# Patient Record
Sex: FEMALE | Race: BLACK OR AFRICAN AMERICAN | Employment: FULL TIME | URBAN - NONMETROPOLITAN AREA
[De-identification: names, ages, dates, MRNs, and addresses within clinical notes are randomized per-mention and may not be internally consistent; named-entity substitution may affect disease eponyms.]

---

## 2017-08-21 ENCOUNTER — NURSE TRIAGE (OUTPATIENT)
Dept: ADMINISTRATIVE | Age: 27
End: 2017-08-21

## 2017-09-11 ENCOUNTER — HOSPITAL ENCOUNTER (OUTPATIENT)
Dept: GENERAL RADIOLOGY | Age: 27
Discharge: HOME | End: 2017-09-11
Payer: COMMERCIAL

## 2017-09-11 ENCOUNTER — HOSPITAL ENCOUNTER (OUTPATIENT)
Age: 27
Discharge: HOME | End: 2017-09-11
Payer: COMMERCIAL

## 2017-09-11 DIAGNOSIS — Z01.818 PRE-OP TESTING: ICD-10-CM

## 2017-09-11 LAB
ALBUMIN SERPL-MCNC: 4.3 G/DL (ref 3.5–5.1)
ALP BLD-CCNC: 100 U/L (ref 38–126)
ALT SERPL-CCNC: 16 U/L (ref 11–66)
ANION GAP SERPL CALCULATED.3IONS-SCNC: 14 MEQ/L (ref 8–16)
AST SERPL-CCNC: 20 U/L (ref 5–40)
BILIRUB SERPL-MCNC: 0.2 MG/DL (ref 0.3–1.2)
BUN BLDV-MCNC: 15 MG/DL (ref 7–22)
CALCIUM SERPL-MCNC: 9.9 MG/DL (ref 8.5–10.5)
CHLORIDE BLD-SCNC: 101 MEQ/L (ref 98–111)
CO2: 25 MEQ/L (ref 23–33)
CREAT SERPL-MCNC: 0.8 MG/DL (ref 0.4–1.2)
EKG ATRIAL RATE: 77 BPM
EKG P AXIS: 69 DEGREES
EKG P-R INTERVAL: 148 MS
EKG Q-T INTERVAL: 368 MS
EKG QRS DURATION: 86 MS
EKG QTC CALCULATION (BAZETT): 416 MS
EKG R AXIS: 66 DEGREES
EKG T AXIS: 62 DEGREES
EKG VENTRICULAR RATE: 77 BPM
GFR SERPL CREATININE-BSD FRML MDRD: > 90 ML/MIN/1.73M2
GLUCOSE BLD-MCNC: 88 MG/DL (ref 70–108)
HCT VFR BLD CALC: 42.8 % (ref 37–47)
HEMOGLOBIN: 13.7 GM/DL (ref 12–16)
MCH RBC QN AUTO: 26.6 PG (ref 27–31)
MCHC RBC AUTO-ENTMCNC: 32 GM/DL (ref 33–37)
MCV RBC AUTO: 83.4 FL (ref 81–99)
PDW BLD-RTO: 13.1 % (ref 11.5–14.5)
PLATELET # BLD: 228 THOU/MM3 (ref 130–400)
PMV BLD AUTO: 9.5 MCM (ref 7.4–10.4)
POTASSIUM SERPL-SCNC: 4.4 MEQ/L (ref 3.5–5.2)
RBC # BLD: 5.14 MILL/MM3 (ref 4.2–5.4)
SODIUM BLD-SCNC: 140 MEQ/L (ref 135–145)
TOTAL PROTEIN: 8 G/DL (ref 6.1–8)
WBC # BLD: 5.3 THOU/MM3 (ref 4.8–10.8)

## 2017-09-11 PROCEDURE — 85027 COMPLETE CBC AUTOMATED: CPT

## 2017-09-11 PROCEDURE — 93005 ELECTROCARDIOGRAM TRACING: CPT

## 2017-09-11 PROCEDURE — 80053 COMPREHEN METABOLIC PANEL: CPT

## 2017-09-11 PROCEDURE — 36415 COLL VENOUS BLD VENIPUNCTURE: CPT

## 2017-09-11 PROCEDURE — 71020 XR CHEST STANDARD TWO VW: CPT

## 2017-09-20 NOTE — PROGRESS NOTES
Chivo Milton.   ----------------------------------------------------------------------------------------------------------------

## 2017-09-27 ENCOUNTER — ANESTHESIA (OUTPATIENT)
Dept: OPERATING ROOM | Age: 27
End: 2017-09-27
Payer: COMMERCIAL

## 2017-09-27 ENCOUNTER — ANESTHESIA EVENT (OUTPATIENT)
Dept: OPERATING ROOM | Age: 27
End: 2017-09-27
Payer: COMMERCIAL

## 2017-09-27 ENCOUNTER — HOSPITAL ENCOUNTER (OUTPATIENT)
Age: 27
Setting detail: OUTPATIENT SURGERY
Discharge: HOME | End: 2017-09-27
Attending: PODIATRIST | Admitting: PODIATRIST
Payer: COMMERCIAL

## 2017-09-27 VITALS
WEIGHT: 155 LBS | SYSTOLIC BLOOD PRESSURE: 116 MMHG | HEIGHT: 66 IN | DIASTOLIC BLOOD PRESSURE: 71 MMHG | BODY MASS INDEX: 24.91 KG/M2 | TEMPERATURE: 98.4 F | RESPIRATION RATE: 16 BRPM | HEART RATE: 94 BPM | OXYGEN SATURATION: 100 %

## 2017-09-27 VITALS
TEMPERATURE: 98 F | OXYGEN SATURATION: 100 % | RESPIRATION RATE: 9 BRPM | DIASTOLIC BLOOD PRESSURE: 80 MMHG | SYSTOLIC BLOOD PRESSURE: 146 MMHG

## 2017-09-27 LAB — PREGNANCY, URINE: NEGATIVE

## 2017-09-27 PROCEDURE — 3700000001 HC ADD 15 MINUTES (ANESTHESIA): Performed by: PODIATRIST

## 2017-09-27 PROCEDURE — C1769 GUIDE WIRE: HCPCS | Performed by: PODIATRIST

## 2017-09-27 PROCEDURE — 7100000011 HC PHASE II RECOVERY - ADDTL 15 MIN: Performed by: PODIATRIST

## 2017-09-27 PROCEDURE — 7100000010 HC PHASE II RECOVERY - FIRST 15 MIN: Performed by: PODIATRIST

## 2017-09-27 PROCEDURE — 81025 URINE PREGNANCY TEST: CPT

## 2017-09-27 PROCEDURE — 3600000004 HC SURGERY LEVEL 4 BASE: Performed by: PODIATRIST

## 2017-09-27 PROCEDURE — 6360000002 HC RX W HCPCS: Performed by: ANESTHESIOLOGY

## 2017-09-27 PROCEDURE — 2580000003 HC RX 258: Performed by: STUDENT IN AN ORGANIZED HEALTH CARE EDUCATION/TRAINING PROGRAM

## 2017-09-27 PROCEDURE — C1713 ANCHOR/SCREW BN/BN,TIS/BN: HCPCS | Performed by: PODIATRIST

## 2017-09-27 PROCEDURE — 2500000003 HC RX 250 WO HCPCS: Performed by: PODIATRIST

## 2017-09-27 PROCEDURE — 7100000001 HC PACU RECOVERY - ADDTL 15 MIN: Performed by: PODIATRIST

## 2017-09-27 PROCEDURE — A6222 GAUZE <=16 IN NO W/SAL W/O B: HCPCS | Performed by: PODIATRIST

## 2017-09-27 PROCEDURE — 6370000000 HC RX 637 (ALT 250 FOR IP): Performed by: STUDENT IN AN ORGANIZED HEALTH CARE EDUCATION/TRAINING PROGRAM

## 2017-09-27 PROCEDURE — 6360000002 HC RX W HCPCS: Performed by: STUDENT IN AN ORGANIZED HEALTH CARE EDUCATION/TRAINING PROGRAM

## 2017-09-27 PROCEDURE — 3600000014 HC SURGERY LEVEL 4 ADDTL 15MIN: Performed by: PODIATRIST

## 2017-09-27 PROCEDURE — 3700000000 HC ANESTHESIA ATTENDED CARE: Performed by: PODIATRIST

## 2017-09-27 PROCEDURE — 7100000000 HC PACU RECOVERY - FIRST 15 MIN: Performed by: PODIATRIST

## 2017-09-27 DEVICE — IMPLANTABLE DEVICE: Type: IMPLANTABLE DEVICE | Site: FOOT | Status: FUNCTIONAL

## 2017-09-27 DEVICE — CANNULATED SCREW
Type: IMPLANTABLE DEVICE | Site: FOOT | Status: FUNCTIONAL
Brand: ASNIS

## 2017-09-27 DEVICE — ALLOGRAFT BNE CRUSH 1-10 MM 10 CC CANC: Type: IMPLANTABLE DEVICE | Site: FOOT | Status: FUNCTIONAL

## 2017-09-27 RX ORDER — MORPHINE SULFATE 10 MG/ML
INJECTION, SOLUTION INTRAMUSCULAR; INTRAVENOUS PRN
Status: DISCONTINUED | OUTPATIENT
Start: 2017-09-27 | End: 2017-09-27 | Stop reason: SDUPTHER

## 2017-09-27 RX ORDER — SODIUM CHLORIDE 0.9 % (FLUSH) 0.9 %
10 SYRINGE (ML) INJECTION EVERY 12 HOURS SCHEDULED
Status: DISCONTINUED | OUTPATIENT
Start: 2017-09-27 | End: 2017-09-27 | Stop reason: HOSPADM

## 2017-09-27 RX ORDER — MORPHINE SULFATE 2 MG/ML
2 INJECTION, SOLUTION INTRAMUSCULAR; INTRAVENOUS EVERY 5 MIN PRN
Status: DISCONTINUED | OUTPATIENT
Start: 2017-09-27 | End: 2017-09-27 | Stop reason: HOSPADM

## 2017-09-27 RX ORDER — ACETAMINOPHEN 325 MG/1
650 TABLET ORAL EVERY 4 HOURS PRN
Status: DISCONTINUED | OUTPATIENT
Start: 2017-09-27 | End: 2017-09-27 | Stop reason: HOSPADM

## 2017-09-27 RX ORDER — DIPHENHYDRAMINE HYDROCHLORIDE 50 MG/ML
12.5 INJECTION INTRAMUSCULAR; INTRAVENOUS
Status: DISCONTINUED | OUTPATIENT
Start: 2017-09-27 | End: 2017-09-27 | Stop reason: HOSPADM

## 2017-09-27 RX ORDER — HYDRALAZINE HYDROCHLORIDE 20 MG/ML
5 INJECTION INTRAMUSCULAR; INTRAVENOUS EVERY 10 MIN PRN
Status: DISCONTINUED | OUTPATIENT
Start: 2017-09-27 | End: 2017-09-27 | Stop reason: HOSPADM

## 2017-09-27 RX ORDER — OXYCODONE HYDROCHLORIDE AND ACETAMINOPHEN 5; 325 MG/1; MG/1
1 TABLET ORAL EVERY 4 HOURS PRN
Status: DISCONTINUED | OUTPATIENT
Start: 2017-09-27 | End: 2017-09-27 | Stop reason: HOSPADM

## 2017-09-27 RX ORDER — FENTANYL CITRATE 50 UG/ML
50 INJECTION, SOLUTION INTRAMUSCULAR; INTRAVENOUS EVERY 5 MIN PRN
Status: DISCONTINUED | OUTPATIENT
Start: 2017-09-27 | End: 2017-09-27 | Stop reason: HOSPADM

## 2017-09-27 RX ORDER — MEPERIDINE HYDROCHLORIDE 25 MG/ML
12.5 INJECTION INTRAMUSCULAR; INTRAVENOUS; SUBCUTANEOUS EVERY 5 MIN PRN
Status: DISCONTINUED | OUTPATIENT
Start: 2017-09-27 | End: 2017-09-27 | Stop reason: HOSPADM

## 2017-09-27 RX ORDER — ONDANSETRON 2 MG/ML
4 INJECTION INTRAMUSCULAR; INTRAVENOUS
Status: DISCONTINUED | OUTPATIENT
Start: 2017-09-27 | End: 2017-09-27 | Stop reason: HOSPADM

## 2017-09-27 RX ORDER — SODIUM CHLORIDE 0.9 % (FLUSH) 0.9 %
10 SYRINGE (ML) INJECTION PRN
Status: DISCONTINUED | OUTPATIENT
Start: 2017-09-27 | End: 2017-09-27 | Stop reason: HOSPADM

## 2017-09-27 RX ORDER — ONDANSETRON 2 MG/ML
4 INJECTION INTRAMUSCULAR; INTRAVENOUS EVERY 6 HOURS PRN
Status: DISCONTINUED | OUTPATIENT
Start: 2017-09-27 | End: 2017-09-27 | Stop reason: HOSPADM

## 2017-09-27 RX ORDER — MIDAZOLAM HYDROCHLORIDE 1 MG/ML
INJECTION INTRAMUSCULAR; INTRAVENOUS PRN
Status: DISCONTINUED | OUTPATIENT
Start: 2017-09-27 | End: 2017-09-27 | Stop reason: SDUPTHER

## 2017-09-27 RX ORDER — OXYCODONE HYDROCHLORIDE AND ACETAMINOPHEN 5; 325 MG/1; MG/1
2 TABLET ORAL EVERY 4 HOURS PRN
Status: DISCONTINUED | OUTPATIENT
Start: 2017-09-27 | End: 2017-09-27 | Stop reason: HOSPADM

## 2017-09-27 RX ORDER — SODIUM CHLORIDE 9 MG/ML
INJECTION, SOLUTION INTRAVENOUS CONTINUOUS
Status: DISCONTINUED | OUTPATIENT
Start: 2017-09-27 | End: 2017-09-27 | Stop reason: HOSPADM

## 2017-09-27 RX ORDER — LABETALOL HYDROCHLORIDE 5 MG/ML
5 INJECTION, SOLUTION INTRAVENOUS EVERY 5 MIN PRN
Status: DISCONTINUED | OUTPATIENT
Start: 2017-09-27 | End: 2017-09-27 | Stop reason: HOSPADM

## 2017-09-27 RX ORDER — DOCUSATE SODIUM 100 MG/1
100 CAPSULE, LIQUID FILLED ORAL 2 TIMES DAILY
Status: DISCONTINUED | OUTPATIENT
Start: 2017-09-27 | End: 2017-09-27 | Stop reason: HOSPADM

## 2017-09-27 RX ORDER — PROPOFOL 10 MG/ML
INJECTION, EMULSION INTRAVENOUS PRN
Status: DISCONTINUED | OUTPATIENT
Start: 2017-09-27 | End: 2017-09-27 | Stop reason: SDUPTHER

## 2017-09-27 RX ORDER — BUPIVACAINE HYDROCHLORIDE AND EPINEPHRINE 5; 5 MG/ML; UG/ML
INJECTION, SOLUTION EPIDURAL; INTRACAUDAL; PERINEURAL PRN
Status: DISCONTINUED | OUTPATIENT
Start: 2017-09-27 | End: 2017-09-27 | Stop reason: HOSPADM

## 2017-09-27 RX ADMIN — PROPOFOL 150 MG: 10 INJECTION, EMULSION INTRAVENOUS at 13:22

## 2017-09-27 RX ADMIN — FENTANYL CITRATE 50 MCG: 50 INJECTION INTRAMUSCULAR; INTRAVENOUS at 16:27

## 2017-09-27 RX ADMIN — MORPHINE SULFATE 2 MG: 10 INJECTION, SOLUTION INTRAMUSCULAR; INTRAVENOUS at 14:05

## 2017-09-27 RX ADMIN — FENTANYL CITRATE 50 MCG: 50 INJECTION INTRAMUSCULAR; INTRAVENOUS at 16:18

## 2017-09-27 RX ADMIN — MORPHINE SULFATE 2 MG: 10 INJECTION, SOLUTION INTRAMUSCULAR; INTRAVENOUS at 15:15

## 2017-09-27 RX ADMIN — OXYCODONE HYDROCHLORIDE AND ACETAMINOPHEN 2 TABLET: 5; 325 TABLET ORAL at 17:22

## 2017-09-27 RX ADMIN — FENTANYL CITRATE 100 MCG: 50 INJECTION INTRAMUSCULAR; INTRAVENOUS at 13:17

## 2017-09-27 RX ADMIN — MORPHINE SULFATE 2 MG: 10 INJECTION, SOLUTION INTRAMUSCULAR; INTRAVENOUS at 14:01

## 2017-09-27 RX ADMIN — MORPHINE SULFATE 2 MG: 10 INJECTION, SOLUTION INTRAMUSCULAR; INTRAVENOUS at 14:30

## 2017-09-27 RX ADMIN — CEFAZOLIN SODIUM 2 G: 2 SOLUTION INTRAVENOUS at 13:26

## 2017-09-27 RX ADMIN — MIDAZOLAM HYDROCHLORIDE 2 MG: 1 INJECTION INTRAMUSCULAR; INTRAVENOUS at 13:16

## 2017-09-27 RX ADMIN — MORPHINE SULFATE 2 MG: 10 INJECTION, SOLUTION INTRAMUSCULAR; INTRAVENOUS at 13:42

## 2017-09-27 RX ADMIN — SODIUM CHLORIDE: 9 INJECTION, SOLUTION INTRAVENOUS at 13:15

## 2017-09-27 ASSESSMENT — PULMONARY FUNCTION TESTS
PIF_VALUE: 2
PIF_VALUE: 19
PIF_VALUE: 2
PIF_VALUE: 19
PIF_VALUE: 19
PIF_VALUE: 10
PIF_VALUE: 24
PIF_VALUE: 3
PIF_VALUE: 2
PIF_VALUE: 13
PIF_VALUE: 5
PIF_VALUE: 2
PIF_VALUE: 19
PIF_VALUE: 3
PIF_VALUE: 5
PIF_VALUE: 2
PIF_VALUE: 2
PIF_VALUE: 16
PIF_VALUE: 19
PIF_VALUE: 2
PIF_VALUE: 2
PIF_VALUE: 3
PIF_VALUE: 19
PIF_VALUE: 17
PIF_VALUE: 2
PIF_VALUE: 2
PIF_VALUE: 18
PIF_VALUE: 2
PIF_VALUE: 18
PIF_VALUE: 11
PIF_VALUE: 19
PIF_VALUE: 19
PIF_VALUE: 17
PIF_VALUE: 18
PIF_VALUE: 2
PIF_VALUE: 3
PIF_VALUE: 16
PIF_VALUE: 13
PIF_VALUE: 17
PIF_VALUE: 20
PIF_VALUE: 19
PIF_VALUE: 2
PIF_VALUE: 2
PIF_VALUE: 17
PIF_VALUE: 18
PIF_VALUE: 5
PIF_VALUE: 10
PIF_VALUE: 19
PIF_VALUE: 2
PIF_VALUE: 16
PIF_VALUE: 17
PIF_VALUE: 17
PIF_VALUE: 2
PIF_VALUE: 17
PIF_VALUE: 2
PIF_VALUE: 16
PIF_VALUE: 18
PIF_VALUE: 15
PIF_VALUE: 23
PIF_VALUE: 18
PIF_VALUE: 4
PIF_VALUE: 2
PIF_VALUE: 2
PIF_VALUE: 5
PIF_VALUE: 2
PIF_VALUE: 3
PIF_VALUE: 19
PIF_VALUE: 3
PIF_VALUE: 2
PIF_VALUE: 2
PIF_VALUE: 17
PIF_VALUE: 3
PIF_VALUE: 2
PIF_VALUE: 2
PIF_VALUE: 10
PIF_VALUE: 19
PIF_VALUE: 2
PIF_VALUE: 17
PIF_VALUE: 22
PIF_VALUE: 17
PIF_VALUE: 2
PIF_VALUE: 5
PIF_VALUE: 3
PIF_VALUE: 2
PIF_VALUE: 5
PIF_VALUE: 17
PIF_VALUE: 3
PIF_VALUE: 2
PIF_VALUE: 19
PIF_VALUE: 26
PIF_VALUE: 3
PIF_VALUE: 18
PIF_VALUE: 18
PIF_VALUE: 3
PIF_VALUE: 18
PIF_VALUE: 2
PIF_VALUE: 19
PIF_VALUE: 2
PIF_VALUE: 3
PIF_VALUE: 9
PIF_VALUE: 3
PIF_VALUE: 17
PIF_VALUE: 19
PIF_VALUE: 2
PIF_VALUE: 22
PIF_VALUE: 3
PIF_VALUE: 18
PIF_VALUE: 17
PIF_VALUE: 2
PIF_VALUE: 0
PIF_VALUE: 19
PIF_VALUE: 19
PIF_VALUE: 0
PIF_VALUE: 4
PIF_VALUE: 2
PIF_VALUE: 9
PIF_VALUE: 19
PIF_VALUE: 17
PIF_VALUE: 2
PIF_VALUE: 19
PIF_VALUE: 15
PIF_VALUE: 2
PIF_VALUE: 2
PIF_VALUE: 4
PIF_VALUE: 17
PIF_VALUE: 2
PIF_VALUE: 3
PIF_VALUE: 2
PIF_VALUE: 17
PIF_VALUE: 19
PIF_VALUE: 2
PIF_VALUE: 14
PIF_VALUE: 0
PIF_VALUE: 2
PIF_VALUE: 3
PIF_VALUE: 19
PIF_VALUE: 17
PIF_VALUE: 17
PIF_VALUE: 2
PIF_VALUE: 6
PIF_VALUE: 1
PIF_VALUE: 4
PIF_VALUE: 19
PIF_VALUE: 1
PIF_VALUE: 22
PIF_VALUE: 17
PIF_VALUE: 16
PIF_VALUE: 20
PIF_VALUE: 19
PIF_VALUE: 19
PIF_VALUE: 2
PIF_VALUE: 3
PIF_VALUE: 19
PIF_VALUE: 2
PIF_VALUE: 4
PIF_VALUE: 0
PIF_VALUE: 2
PIF_VALUE: 19
PIF_VALUE: 16

## 2017-09-27 ASSESSMENT — PAIN SCALES - GENERAL
PAINLEVEL_OUTOF10: 9
PAINLEVEL_OUTOF10: 8
PAINLEVEL_OUTOF10: 10
PAINLEVEL_OUTOF10: 10

## 2017-09-27 ASSESSMENT — PAIN DESCRIPTION - ORIENTATION: ORIENTATION: LEFT

## 2017-09-27 ASSESSMENT — PAIN DESCRIPTION - LOCATION: LOCATION: FOOT

## 2017-09-27 ASSESSMENT — PAIN - FUNCTIONAL ASSESSMENT: PAIN_FUNCTIONAL_ASSESSMENT: 0-10

## 2017-09-27 ASSESSMENT — PAIN DESCRIPTION - DESCRIPTORS: DESCRIPTORS: ACHING

## 2017-09-27 ASSESSMENT — PAIN DESCRIPTION - PAIN TYPE: TYPE: SURGICAL PAIN

## 2017-09-27 NOTE — OP NOTE
procedure has been explained in detail to the patient, outlining  the risks, benefits and possible complications including the need for further  surgery. No promises have been made as to the surgical outcome. Consent was reviewed and signed. All questions and concerns regarding the case were addressed in pre-op. The surgical site was verified and marked by Dr. Jessee Stewart. The patient was then transferred to the operative room and place in a supine position. Time out taken, verifying patient and planned procedure. A well padded thigh tourniquet was applied to the left leg. Patient was administered general anesthesia. Surgical site  was then prepped using betadine and draped using sterile technique. The patient received 60cc of 0.5% marcaine with epinephrine post-operatively. Procedure In Detail  Procedure:   1. Gastrocnemius Resection Left Leg:   Attention was then directed to the posterior calf of the left leg. The medial and lateral heads of the gastrocnemius muscle were palpated, and a skin line was drawn just medial to the midline of the gastroc aponeurosis, to ensure visualization of the larger medial gastrocnemius head. A sharp linear incision, approximately 3cm in length was made full thickness with a scalpel blade. Reyes scissors were then used to bluntly dissect through subcutaneous tissue, making sure to retract all vital neurovascular structures. Upon visualization of the paratenon, blunt dissection with a finger was used to clear the gastrocnemius and paratenon of any subcutaneous fat that was adhered. A scalpel blade was then used to linearly incise the paratenon, allowing for clear visualization of the gastrocnemius. The paratenon was then retracted medially and laterally with [place]'s. The gastrocnemius tendon was then incised with a scalpel blade, cutting all fibers in a transverse fashion. Blunt inspection with a finger was used to ensure release of all gastroc fibers.  The surgical area was then dissected using a key periosteal elevator. Using a TPS sagittal saw, a complete osteotomy was made from the superior posterior aspect of the calcaneus to to the plantar tubercle. An osteotome was then placed in the osteotomy to stretch the medial periosteum. The tuber segment of the calcaneus was then translated medially, approximately 1 cm and was provisionally fixated with a 0.062 kwire. The foot was held in [address] during displacement in order to relax the Achilles tendon . At this time, a cannulated drill bit was used to create a drill hole for screw fixation. Positioning of the drill was checked under fluroscopy. Once adequate alignment was achieved, the cannulated drill was removed, the drill whole was measured, and a 6.5  60mm luis asnis, partially threaded cancellous screw was placed. Screw postioning was checked under fluoroscopy and was found to be adequate and the temporary Kwire fixation was removed. 4. Cotton Osteotomy, left foot. Attention was then directed to the dorsal medial aspect of the medial cuneiform, which was visualized under flourscopy. A full thickness incision was made using a 15 scalpel, extending dorsally from the proximal to distal most aspect of the medial cuneiform. The initial incision was made full thickness, making sure to carefully retract all vital nvs, and cauterize any small bleeding vessels. Blunt dissection was then carried down to the level of the periosteum. Using a TPS sagittal saw, an osteotomy was made from dorsal to plantar fashion in the medial cuneiform. A hentermen retractor was then utilized with the addition of (2) 0.62 kwires to distract the osteotomy. At this time the foot position was evaluated and the graft size was determined to be 10mm. An allograft iliac crest graft was then remolded in a trapezoid shape and placed in the distraction site. At this time the hinterman retractor and Kwires were removed.  Graft positioning was

## 2017-09-27 NOTE — IP AVS SNAPSHOT
After Visit Summary  (Discharge Instructions)    Medication List for Home    Based on the information you provided to us as well as any changes during this visit, the following is your updated medication list.  Compare this with your prescription bottles at home. If you have any questions or concerns, contact your primary care physician's office. Daily Medication List (This medication list can be shared with any healthcare provider who is helping you manage your medications)      These are medications you told us you were taking at home, CONTINUE taking them after you leave the hospital        Last Dose    Next Dose Due AM NOON PM NIGHT    ADVAIR -21 MCG/ACT inhaler   Generic drug:  fluticasone-salmeterol   Inhale 1 puff into the lungs 2 times daily. albuterol sulfate  (90 Base) MCG/ACT inhaler   Commonly known as:  PROVENTIL HFA   Inhale 2 puffs into the lungs every 4 hours as needed for Wheezing or Shortness of Breath (Space out to every 6 hours as symptoms improve). Space out to every 6 hours as symptoms improve. INHALER COMPANIONS   by Does not apply route. PRENATAL VIT-DSS-FE FUM-FA PO   Take  by mouth. ranitidine 150 MG tablet   Commonly known as:  ZANTAC   Take 1 tablet by mouth 2 times daily                                                 Allergies as of 9/27/2017     No Known Allergies      Immunizations as of 9/27/2017     Name Date Dose VIS Date Route    Tdap (Boostrix, Adacel) 2/22/2017 0.5 mL 2/24/2015 Intramuscular    Tdap (Boostrix, Adacel) 5/15/2014 0.5 mL 5/9/2013 Intramuscular      Last Vitals          Most Recent Value    Temp  98.4 °F (36.9 °C)    Pulse  87    Resp  11    BP  119/76         After Visit Summary    This summary was created for you. Thank you for entrusting your care to us. The following information includes details about your hospital/visit stay along with steps you should take to help with your recovery once you leave the hospital.  In this packet, you will find information about the topics listed below:    · Instructions about your medications including a list of your home medications  · A summary of your hospital visit  · Follow-up appointments once you have left the hospital  · Your care plan at home      You may receive a survey regarding the care you received during your stay. Your input is valuable to us. We encourage you to complete and return your survey in the envelope provided. We hope you will choose us in the future for your healthcare needs. Patient Information     Patient Name VINCENZO Duff 1990      Care Provided at:     Name Address Phone       [address] [phone]            Your Visit    Here you will find information about your visit, including the reason for your visit. Please take this sheet with you when you visit your doctor or other health care provider in the future. It will help determine the best possible medical care for you at that time. If you have any questions once you leave the hospital, please call the department phone number listed below. Why you were here     Your primary diagnosis was:  Not on File      Visit Information     Date & Time Provider Department Dept. Phone    2017 Brandon Ronquillo [phone]       Follow-up Appointments    Below is a list of your follow-up and future appointments. This may not be a complete list as you may have made appointments directly with providers that we are not aware of or your providers may have made some for you. Please call your providers to confirm appointments. It is important to keep your appointments. Please bring your current insurance card, photo ID, co-pay, and all medication bottles to your appointment. If self-pay, payment is expected at the time of service. Preventive Care        Date Due    Pap Smear 10/8/2012    Yearly Flu Vaccine (1) 9/1/2017    Tetanus Combination Vaccine (3 - Td) 2/22/2027                 Care Plan Once You Return Home    This section includes instructions you will need to follow once you leave the hospital.  Your care team will discuss these with you, so you and those caring for you know how to best care for your health needs at home. This section may also include educational information about certain health topics that may be of help to you. Discharge Instructions       Post Op Instructions    Pt Name: Hi Landry Record Number: [id number]  Today's Date: 9/27/2017    GENERAL ANESTHESIA OR SEDATION  1. Do not drive or operate hazardous machinery for 24 hours. 2. Do not make important business or personal decisions for 24 hours. 3. Do not drink alcoholic beverages or use tobacco for 24 hours. ACTIVITY INSTRUCTIONS:   Rest today. Resume light to normal activity tomorrow. You may ambulate as tolerated in your post op shoe/boot. No weight is to be placed on the operative limb. Use crutches, walker, Roll-a-bout as needed. Elevate the operative limb as much as possible to reduce swelling and discomfort for the first 72 hours      DIET INSTRUCTIONS:  Begin with clear liquids. If not nauseated, may increase to a low-fat diet when you desire. Regular diet as tolerated. Other:     MEDICATIONS  Prescription sent with you to be used as directed. Toradol   Vicodin   Percocet   Tylenol #3   Duricef   Do not drink alcohol or drive while taking these medications. You may experience dizziness or drowsiness with these medications.  You may also experience constipation which can be relieved with stool softners or

## 2017-09-27 NOTE — IP AVS SNAPSHOT
Patient Information     Patient Name VINCENZO Camara N 1990         This is your updated medication list to keep with you all times      TAKE these medications     ADVAIR -21 MCG/ACT inhaler   Generic drug:  fluticasone-salmeterol       albuterol sulfate  (90 Base) MCG/ACT inhaler   Commonly known as:  PROVENTIL HFA   Inhale 2 puffs into the lungs every 4 hours as needed for Wheezing or Shortness of Breath (Space out to every 6 hours as symptoms improve). Space out to every 6 hours as symptoms improve.        INHALER COMPANIONS       PRENATAL VIT-DSS-FE FUM-FA PO       ranitidine 150 MG tablet   Commonly known as:  ZANTAC   Take 1 tablet by mouth 2 times daily

## 2018-01-18 ENCOUNTER — HOSPITAL ENCOUNTER (OUTPATIENT)
Dept: GENERAL RADIOLOGY | Age: 28
Discharge: HOME | End: 2018-01-18
Payer: COMMERCIAL

## 2018-01-18 ENCOUNTER — HOSPITAL ENCOUNTER (OUTPATIENT)
Age: 28
Discharge: HOME | End: 2018-01-18
Payer: COMMERCIAL

## 2018-01-18 DIAGNOSIS — Z01.818 PRE-OP TESTING: ICD-10-CM

## 2018-01-18 LAB
ALBUMIN SERPL-MCNC: 3.9 G/DL (ref 3.5–5.1)
ALP BLD-CCNC: 99 U/L (ref 38–126)
ALT SERPL-CCNC: 16 U/L (ref 11–66)
ANION GAP SERPL CALCULATED.3IONS-SCNC: 14 MEQ/L (ref 8–16)
AST SERPL-CCNC: 20 U/L (ref 5–40)
BILIRUB SERPL-MCNC: 0.2 MG/DL (ref 0.3–1.2)
BUN BLDV-MCNC: 14 MG/DL (ref 7–22)
CALCIUM SERPL-MCNC: 9.3 MG/DL (ref 8.5–10.5)
CHLORIDE BLD-SCNC: 104 MEQ/L (ref 98–111)
CO2: 22 MEQ/L (ref 23–33)
CREAT SERPL-MCNC: 0.6 MG/DL (ref 0.4–1.2)
GFR SERPL CREATININE-BSD FRML MDRD: > 90 ML/MIN/1.73M2
GLUCOSE BLD-MCNC: 84 MG/DL (ref 70–108)
HCT VFR BLD CALC: 38.3 % (ref 37–47)
HEMOGLOBIN: 12.4 GM/DL (ref 12–16)
MCH RBC QN AUTO: 26 PG (ref 27–31)
MCHC RBC AUTO-ENTMCNC: 32.4 GM/DL (ref 33–37)
MCV RBC AUTO: 80.4 FL (ref 81–99)
PDW BLD-RTO: 14.1 % (ref 11.5–14.5)
PLATELET # BLD: 220 THOU/MM3 (ref 130–400)
PMV BLD AUTO: 9.1 MCM (ref 7.4–10.4)
POTASSIUM SERPL-SCNC: 4.8 MEQ/L (ref 3.5–5.2)
RBC # BLD: 4.76 MILL/MM3 (ref 4.2–5.4)
SODIUM BLD-SCNC: 140 MEQ/L (ref 135–145)
TOTAL PROTEIN: 7.4 G/DL (ref 6.1–8)
WBC # BLD: 5.2 THOU/MM3 (ref 4.8–10.8)

## 2018-01-18 PROCEDURE — 36415 COLL VENOUS BLD VENIPUNCTURE: CPT

## 2018-01-18 PROCEDURE — 80053 COMPREHEN METABOLIC PANEL: CPT

## 2018-01-18 PROCEDURE — 85027 COMPLETE CBC AUTOMATED: CPT

## 2018-01-18 PROCEDURE — 71046 X-RAY EXAM CHEST 2 VIEWS: CPT

## 2018-02-13 ENCOUNTER — ANESTHESIA EVENT (OUTPATIENT)
Dept: OPERATING ROOM | Age: 28
End: 2018-02-13
Payer: COMMERCIAL

## 2018-02-14 ENCOUNTER — ANESTHESIA (OUTPATIENT)
Dept: OPERATING ROOM | Age: 28
End: 2018-02-14
Payer: COMMERCIAL

## 2018-02-14 ENCOUNTER — HOSPITAL ENCOUNTER (OUTPATIENT)
Age: 28
Setting detail: OUTPATIENT SURGERY
Discharge: HOME | End: 2018-02-14
Attending: PODIATRIST | Admitting: PODIATRIST
Payer: COMMERCIAL

## 2018-02-14 VITALS
SYSTOLIC BLOOD PRESSURE: 106 MMHG | RESPIRATION RATE: 5 BRPM | DIASTOLIC BLOOD PRESSURE: 47 MMHG | OXYGEN SATURATION: 100 % | TEMPERATURE: 96.4 F

## 2018-02-14 VITALS
TEMPERATURE: 97.7 F | DIASTOLIC BLOOD PRESSURE: 75 MMHG | SYSTOLIC BLOOD PRESSURE: 116 MMHG | HEART RATE: 73 BPM | RESPIRATION RATE: 14 BRPM | OXYGEN SATURATION: 99 % | HEIGHT: 66 IN | BODY MASS INDEX: 24.17 KG/M2 | WEIGHT: 150.4 LBS

## 2018-02-14 LAB — PREGNANCY, URINE: NEGATIVE

## 2018-02-14 PROCEDURE — 3700000001 HC ADD 15 MINUTES (ANESTHESIA): Performed by: PODIATRIST

## 2018-02-14 PROCEDURE — 6370000000 HC RX 637 (ALT 250 FOR IP): Performed by: STUDENT IN AN ORGANIZED HEALTH CARE EDUCATION/TRAINING PROGRAM

## 2018-02-14 PROCEDURE — 6360000002 HC RX W HCPCS: Performed by: PODIATRIST

## 2018-02-14 PROCEDURE — 2500000003 HC RX 250 WO HCPCS: Performed by: ANESTHESIOLOGY

## 2018-02-14 PROCEDURE — 3700000000 HC ANESTHESIA ATTENDED CARE: Performed by: PODIATRIST

## 2018-02-14 PROCEDURE — 3600000014 HC SURGERY LEVEL 4 ADDTL 15MIN: Performed by: PODIATRIST

## 2018-02-14 PROCEDURE — 6370000000 HC RX 637 (ALT 250 FOR IP): Performed by: ANESTHESIOLOGY

## 2018-02-14 PROCEDURE — 7100000000 HC PACU RECOVERY - FIRST 15 MIN: Performed by: PODIATRIST

## 2018-02-14 PROCEDURE — 7100000010 HC PHASE II RECOVERY - FIRST 15 MIN: Performed by: PODIATRIST

## 2018-02-14 PROCEDURE — 7100000001 HC PACU RECOVERY - ADDTL 15 MIN: Performed by: PODIATRIST

## 2018-02-14 PROCEDURE — 2500000003 HC RX 250 WO HCPCS: Performed by: PODIATRIST

## 2018-02-14 PROCEDURE — 7100000011 HC PHASE II RECOVERY - ADDTL 15 MIN: Performed by: PODIATRIST

## 2018-02-14 PROCEDURE — 3600000004 HC SURGERY LEVEL 4 BASE: Performed by: PODIATRIST

## 2018-02-14 PROCEDURE — 81025 URINE PREGNANCY TEST: CPT

## 2018-02-14 PROCEDURE — 6360000002 HC RX W HCPCS: Performed by: ANESTHESIOLOGY

## 2018-02-14 RX ORDER — FENTANYL CITRATE 50 UG/ML
25 INJECTION, SOLUTION INTRAMUSCULAR; INTRAVENOUS EVERY 5 MIN PRN
Status: DISCONTINUED | OUTPATIENT
Start: 2018-02-14 | End: 2018-02-14 | Stop reason: HOSPADM

## 2018-02-14 RX ORDER — FENTANYL CITRATE 50 UG/ML
50 INJECTION, SOLUTION INTRAMUSCULAR; INTRAVENOUS EVERY 5 MIN PRN
Status: DISCONTINUED | OUTPATIENT
Start: 2018-02-14 | End: 2018-02-14 | Stop reason: HOSPADM

## 2018-02-14 RX ORDER — OXYCODONE HYDROCHLORIDE AND ACETAMINOPHEN 5; 325 MG/1; MG/1
1 TABLET ORAL EVERY 4 HOURS PRN
Status: DISCONTINUED | OUTPATIENT
Start: 2018-02-14 | End: 2018-02-14 | Stop reason: HOSPADM

## 2018-02-14 RX ORDER — PROMETHAZINE HYDROCHLORIDE 25 MG/ML
12.5 INJECTION, SOLUTION INTRAMUSCULAR; INTRAVENOUS
Status: DISCONTINUED | OUTPATIENT
Start: 2018-02-14 | End: 2018-02-14 | Stop reason: HOSPADM

## 2018-02-14 RX ORDER — IPRATROPIUM BROMIDE AND ALBUTEROL SULFATE 2.5; .5 MG/3ML; MG/3ML
1 SOLUTION RESPIRATORY (INHALATION) ONCE
Status: COMPLETED | OUTPATIENT
Start: 2018-02-14 | End: 2018-02-14

## 2018-02-14 RX ORDER — ONDANSETRON 2 MG/ML
4 INJECTION INTRAMUSCULAR; INTRAVENOUS EVERY 6 HOURS PRN
Status: DISCONTINUED | OUTPATIENT
Start: 2018-02-14 | End: 2018-02-14 | Stop reason: HOSPADM

## 2018-02-14 RX ORDER — PROPOFOL 10 MG/ML
INJECTION, EMULSION INTRAVENOUS PRN
Status: DISCONTINUED | OUTPATIENT
Start: 2018-02-14 | End: 2018-02-14 | Stop reason: SDUPTHER

## 2018-02-14 RX ORDER — DEXAMETHASONE SODIUM PHOSPHATE 4 MG/ML
INJECTION, SOLUTION INTRA-ARTICULAR; INTRALESIONAL; INTRAMUSCULAR; INTRAVENOUS; SOFT TISSUE PRN
Status: DISCONTINUED | OUTPATIENT
Start: 2018-02-14 | End: 2018-02-14 | Stop reason: SDUPTHER

## 2018-02-14 RX ORDER — BUPIVACAINE HYDROCHLORIDE AND EPINEPHRINE 5; 5 MG/ML; UG/ML
INJECTION, SOLUTION EPIDURAL; INTRACAUDAL; PERINEURAL PRN
Status: DISCONTINUED | OUTPATIENT
Start: 2018-02-14 | End: 2018-02-14 | Stop reason: HOSPADM

## 2018-02-14 RX ORDER — FENTANYL CITRATE 50 UG/ML
INJECTION, SOLUTION INTRAMUSCULAR; INTRAVENOUS PRN
Status: DISCONTINUED | OUTPATIENT
Start: 2018-02-14 | End: 2018-02-14 | Stop reason: SDUPTHER

## 2018-02-14 RX ORDER — SODIUM CHLORIDE 0.9 % (FLUSH) 0.9 %
10 SYRINGE (ML) INJECTION PRN
Status: DISCONTINUED | OUTPATIENT
Start: 2018-02-14 | End: 2018-02-14 | Stop reason: HOSPADM

## 2018-02-14 RX ORDER — LIDOCAINE HYDROCHLORIDE 10 MG/ML
INJECTION, SOLUTION EPIDURAL; INFILTRATION; INTRACAUDAL; PERINEURAL PRN
Status: DISCONTINUED | OUTPATIENT
Start: 2018-02-14 | End: 2018-02-14 | Stop reason: SDUPTHER

## 2018-02-14 RX ORDER — SODIUM CHLORIDE 0.9 % (FLUSH) 0.9 %
10 SYRINGE (ML) INJECTION EVERY 12 HOURS SCHEDULED
Status: DISCONTINUED | OUTPATIENT
Start: 2018-02-14 | End: 2018-02-14 | Stop reason: HOSPADM

## 2018-02-14 RX ORDER — ONDANSETRON 2 MG/ML
INJECTION INTRAMUSCULAR; INTRAVENOUS PRN
Status: DISCONTINUED | OUTPATIENT
Start: 2018-02-14 | End: 2018-02-14 | Stop reason: SDUPTHER

## 2018-02-14 RX ORDER — ACETAMINOPHEN 325 MG/1
650 TABLET ORAL EVERY 4 HOURS PRN
Status: DISCONTINUED | OUTPATIENT
Start: 2018-02-14 | End: 2018-02-14 | Stop reason: HOSPADM

## 2018-02-14 RX ORDER — OXYCODONE HYDROCHLORIDE AND ACETAMINOPHEN 5; 325 MG/1; MG/1
2 TABLET ORAL EVERY 4 HOURS PRN
Status: DISCONTINUED | OUTPATIENT
Start: 2018-02-14 | End: 2018-02-14 | Stop reason: HOSPADM

## 2018-02-14 RX ORDER — MEPERIDINE HYDROCHLORIDE 25 MG/ML
12.5 INJECTION INTRAMUSCULAR; INTRAVENOUS; SUBCUTANEOUS EVERY 5 MIN PRN
Status: DISCONTINUED | OUTPATIENT
Start: 2018-02-14 | End: 2018-02-14 | Stop reason: HOSPADM

## 2018-02-14 RX ORDER — IPRATROPIUM BROMIDE AND ALBUTEROL SULFATE 2.5; .5 MG/3ML; MG/3ML
SOLUTION RESPIRATORY (INHALATION)
Status: DISCONTINUED
Start: 2018-02-14 | End: 2018-02-14 | Stop reason: HOSPADM

## 2018-02-14 RX ORDER — LABETALOL HYDROCHLORIDE 5 MG/ML
5 INJECTION, SOLUTION INTRAVENOUS EVERY 10 MIN PRN
Status: DISCONTINUED | OUTPATIENT
Start: 2018-02-14 | End: 2018-02-14 | Stop reason: HOSPADM

## 2018-02-14 RX ADMIN — PROPOFOL 150 MG: 10 INJECTION, EMULSION INTRAVENOUS at 11:51

## 2018-02-14 RX ADMIN — DEXAMETHASONE SODIUM PHOSPHATE 8 MG: 4 INJECTION, SOLUTION INTRAMUSCULAR; INTRAVENOUS at 11:51

## 2018-02-14 RX ADMIN — FENTANYL CITRATE 100 MCG: 50 INJECTION INTRAMUSCULAR; INTRAVENOUS at 11:51

## 2018-02-14 RX ADMIN — CEFAZOLIN SODIUM 2 G: 2 SOLUTION INTRAVENOUS at 11:59

## 2018-02-14 RX ADMIN — LIDOCAINE HYDROCHLORIDE 4 ML: 10 INJECTION, SOLUTION EPIDURAL; INFILTRATION; INTRACAUDAL; PERINEURAL at 11:50

## 2018-02-14 RX ADMIN — ONDANSETRON 4 MG: 2 INJECTION INTRAMUSCULAR; INTRAVENOUS at 11:51

## 2018-02-14 RX ADMIN — OXYCODONE HYDROCHLORIDE AND ACETAMINOPHEN 1 TABLET: 5; 325 TABLET ORAL at 13:18

## 2018-02-14 RX ADMIN — IPRATROPIUM BROMIDE AND ALBUTEROL SULFATE 1 AMPULE: .5; 3 SOLUTION RESPIRATORY (INHALATION) at 12:38

## 2018-02-14 ASSESSMENT — PAIN DESCRIPTION - DESCRIPTORS: DESCRIPTORS: ACHING

## 2018-02-14 ASSESSMENT — PULMONARY FUNCTION TESTS
PIF_VALUE: 4
PIF_VALUE: 13
PIF_VALUE: 0
PIF_VALUE: 0
PIF_VALUE: 2
PIF_VALUE: 3
PIF_VALUE: 2
PIF_VALUE: 4
PIF_VALUE: 0
PIF_VALUE: 4
PIF_VALUE: 13
PIF_VALUE: 12
PIF_VALUE: 4
PIF_VALUE: 5
PIF_VALUE: 3
PIF_VALUE: 15
PIF_VALUE: 5
PIF_VALUE: 4
PIF_VALUE: 5
PIF_VALUE: 19
PIF_VALUE: 15
PIF_VALUE: 14
PIF_VALUE: 1
PIF_VALUE: 2

## 2018-02-14 ASSESSMENT — PAIN SCALES - GENERAL
PAINLEVEL_OUTOF10: 6
PAINLEVEL_OUTOF10: 0

## 2018-02-14 ASSESSMENT — PAIN - FUNCTIONAL ASSESSMENT: PAIN_FUNCTIONAL_ASSESSMENT: 0-10

## 2018-02-14 NOTE — BRIEF OP NOTE
Brief Postoperative Note  ______________________________________________________________    Patient: Rosaline Awan  YOB: 1990  MRN: [id number]  Date of Procedure: 2/14/2018    Pre-Op Diagnosis: INFLAMMATORY REACTION DUE TO OTHER INTERNAL ORTHOPEDIC PROSTHETIC DEVICES IMPLANTS AND GRAFTS SUBSEQUENT ENCOUNTER, CONGENITAL TALIPES CALCANEOVALGUS    Post-Op Diagnosis: Same       Procedure(s):  REMOVAL OF SCREW DROM CALCANEUS LEFT HEEL    Anesthesia: General    Surgeon(s):  Arnol Hernandez DPM     Assistant:  Maritza Lui PGY-3     Injectables: 10 cc of 0.5% marcaine with epi     Staff:  Scrub Person First: Roselia Jefferson     Estimated Blood Loss: 2 (units unknown) mL    Complications: None      Luci King DPM  Date: 2/14/2018  Time: 12:38 PM

## 2018-02-14 NOTE — ANESTHESIA POSTPROCEDURE EVALUATION
Department of Anesthesiology  Postprocedure Note    Patient: Allyssa Garcia  MRN: [id number]  YOB: 1990  Date of evaluation: 2/14/2018  Time:  12:32 PM     Procedure Summary     Date:  02/14/18 Room / Location:  [facility] OR 01 / [address]    Anesthesia Start:  1149 Anesthesia Stop:  1219    Procedure:  REMOVAL OF SCREW DROM CALCANEUS LEFT HEEL (Left ) Diagnosis:  (INFECTIO AND INFLAMMATORY REACTION DUE TO OTHER INTERNAL ORTHOPEDIC PROSTHETIC DEVICES IMPLANTS AND GRAFTS SUBSEQUENT ENCOUNTER, CONGENITAL TALIPES CALCANEOVALGUS)    Surgeon:  Anabella Dempsey DPM Responsible Provider:  Bryan Ly, DO    Anesthesia Type:  general ASA Status:  2          Anesthesia Type: general    Earl Phase I: Earl Score: 9    Earl Phase II:      Last vitals: Reviewed and per EMR flowsheets.        Anesthesia Post Evaluation    Patient location during evaluation: PACU  Patient participation: complete - patient participated  Level of consciousness: awake and alert  Airway patency: patent  Nausea & Vomiting: no nausea and no vomiting  Complications: no  Cardiovascular status: hemodynamically stable  Respiratory status: acceptable  Hydration status: stable

## 2018-02-14 NOTE — ANESTHESIA PRE PROCEDURE
Department of Anesthesiology  Preprocedure Note       Name:  Rosalinda Baca   Age:  33 y.o.  :  1990                                          MRN:  [id number]         Date:  2018      Surgeon: Malcolm Aly):  Arnol Lvey DPM    Procedure: Procedure(s):  REMOVAL OF SCREW DROM CALCANEUS LEFT HEEL    Medications prior to admission:   Prior to Admission medications    Medication Sig Start Date End Date Taking? Authorizing Provider   ranitidine (ZANTAC) 150 MG tablet Take 1 tablet by mouth 2 times daily 3/17/17   J Luis Gonzales MD   [facility] 115-21 MCG/ACT inhaler Inhale 1 puff into the lungs 2 times daily. 13   Historical Provider, MD   albuterol (PROVENTIL HFA) 108 (90 BASE) MCG/ACT inhaler Inhale 2 puffs into the lungs every 4 hours as needed for Wheezing or Shortness of Breath (Space out to every 6 hours as symptoms improve). Space out to every 6 hours as symptoms improve. 3/2/13   Leslie Harris, DO   PRENATAL VIT-DSS-FE FUM-FA PO Take  by mouth. Historical Provider, MD       Current medications:    No current facility-administered medications for this encounter.         Allergies:  No Known Allergies    Problem List:    Patient Active Problem List   Diagnosis Code    Constipation K59.00    Numbness R20.0    Blurred vision, bilateral H53.8    Normal delivery O80, Z37.9    Chronic interstitial cystitis N30.10    Normal delivery O80, Z37.9       Past Medical History:        Diagnosis Date    Anemia     on iron supplements    Asthma     Albuterol and Advair    Constipation     Headache     Interstitial cystitis     Sickle cell anemia ([facility] [address]     Has trait       Past Surgical History:        Procedure Laterality Date    ANKLE SURGERY      bilateral ankle revision    COLONOSCOPY  2013    CYSTOSCOPY  9/28/15    WITH HYDRODISTENTION    DILATION AND CURETTAGE OF UTERUS      for Missed AB    FOOT SURGERY Bilateral 2016    FOOT SURGERY Left 2017    osteotomy , gastrocnemius resectopm    LAPAROSCOPY  7/1/13    OK GASTROCNEMIUS RECESSION Left 9/27/2017    MEDIAL CALCANEALSLIDE WITH SCREW FIXATION LEFT FOOT, COTTON OSTEOTOMY LEFT FOOT, GASTROC LENGTHENING LEFT LEG performed by Renetta Alejandra DPM at [address] [place] [address]  2014       Social History:    Social History   Substance Use Topics    Smoking status: Never Smoker    Smokeless tobacco: Never Used    Alcohol use No                                Counseling given: Not Answered      Vital Signs (Current):   Vitals:    02/07/18 1147 02/14/18 1135   BP:  124/72   Pulse:  71   Resp:  16   Temp:  98.4 °F (36.9 °C)   TempSrc:  Temporal   SpO2:  99%   Weight: 148 lb (67.1 kg) 150 lb 6.4 oz (68.2 kg)   Height: 5' 6\" (1.676 m) 5' 6\" (1.676 m)                                              BP Readings from Last 3 Encounters:   02/14/18 124/72   09/27/17 116/71   09/27/17 (!) 146/80       NPO Status: Time of last liquid consumption: 2300                        Time of last solid consumption: 2300                        Date of last liquid consumption: 02/13/18                        Date of last solid food consumption: 02/13/18    BMI:   Wt Readings from Last 3 Encounters:   02/14/18 150 lb 6.4 oz (68.2 kg)   09/27/17 155 lb (70.3 kg)   02/21/17 151 lb (68.5 kg)     Body mass index is 24.28 kg/m².     CBC:   Lab Results   Component Value Date    WBC 5.2 01/18/2018    RBC 4.76 01/18/2018    RBC 4.63 08/12/2016    HGB 12.4 01/18/2018    HCT 38.3 01/18/2018    MCV 80.4 01/18/2018    RDW 14.1 01/18/2018     01/18/2018       CMP:   Lab Results   Component Value Date     01/18/2018    K 4.8 01/18/2018     01/18/2018    CO2 22 01/18/2018    BUN 14 01/18/2018    CREATININE 0.6 01/18/2018    LABGLOM >90 01/18/2018    GLUCOSE 84 01/18/2018    PROT 7.4 01/18/2018    CALCIUM 9.3 01/18/2018    BILITOT 0.2 01/18/2018    ALKPHOS 99 01/18/2018    AST 20 01/18/2018    ALT 16 01/18/2018       POC

## 2018-03-21 RX ORDER — ACETAMINOPHEN 325 MG/1
500 TABLET ORAL EVERY 6 HOURS PRN
COMMUNITY

## 2018-03-21 RX ORDER — ACETAMINOPHEN AND CODEINE PHOSPHATE 120; 12 MG/5ML; MG/5ML
1 SOLUTION ORAL DAILY
Status: ON HOLD | COMMUNITY
End: 2019-10-13

## 2018-03-28 ENCOUNTER — ANESTHESIA (OUTPATIENT)
Dept: OPERATING ROOM | Age: 28
End: 2018-03-28
Payer: COMMERCIAL

## 2018-03-28 ENCOUNTER — HOSPITAL ENCOUNTER (OUTPATIENT)
Age: 28
Setting detail: OUTPATIENT SURGERY
Discharge: HOME | End: 2018-03-28
Attending: PODIATRIST | Admitting: PODIATRIST
Payer: COMMERCIAL

## 2018-03-28 ENCOUNTER — ANESTHESIA EVENT (OUTPATIENT)
Dept: OPERATING ROOM | Age: 28
End: 2018-03-28
Payer: COMMERCIAL

## 2018-03-28 VITALS
DIASTOLIC BLOOD PRESSURE: 69 MMHG | SYSTOLIC BLOOD PRESSURE: 117 MMHG | TEMPERATURE: 98.6 F | RESPIRATION RATE: 1 BRPM | OXYGEN SATURATION: 100 %

## 2018-03-28 VITALS
RESPIRATION RATE: 21 BRPM | HEART RATE: 108 BPM | OXYGEN SATURATION: 99 % | BODY MASS INDEX: 23.21 KG/M2 | TEMPERATURE: 98.5 F | WEIGHT: 144.4 LBS | HEIGHT: 66 IN | DIASTOLIC BLOOD PRESSURE: 78 MMHG | SYSTOLIC BLOOD PRESSURE: 127 MMHG

## 2018-03-28 LAB — PREGNANCY, URINE: NEGATIVE

## 2018-03-28 PROCEDURE — 7100000000 HC PACU RECOVERY - FIRST 15 MIN: Performed by: PODIATRIST

## 2018-03-28 PROCEDURE — 7100000001 HC PACU RECOVERY - ADDTL 15 MIN: Performed by: PODIATRIST

## 2018-03-28 PROCEDURE — 7100000010 HC PHASE II RECOVERY - FIRST 15 MIN: Performed by: PODIATRIST

## 2018-03-28 PROCEDURE — 6360000002 HC RX W HCPCS: Performed by: NURSE ANESTHETIST, CERTIFIED REGISTERED

## 2018-03-28 PROCEDURE — 6360000002 HC RX W HCPCS: Performed by: STUDENT IN AN ORGANIZED HEALTH CARE EDUCATION/TRAINING PROGRAM

## 2018-03-28 PROCEDURE — C1769 GUIDE WIRE: HCPCS | Performed by: PODIATRIST

## 2018-03-28 PROCEDURE — 2500000003 HC RX 250 WO HCPCS

## 2018-03-28 PROCEDURE — 2720000010 HC SURG SUPPLY STERILE: Performed by: PODIATRIST

## 2018-03-28 PROCEDURE — 81025 URINE PREGNANCY TEST: CPT

## 2018-03-28 PROCEDURE — 2580000003 HC RX 258: Performed by: NURSE ANESTHETIST, CERTIFIED REGISTERED

## 2018-03-28 PROCEDURE — 2500000003 HC RX 250 WO HCPCS: Performed by: NURSE ANESTHETIST, CERTIFIED REGISTERED

## 2018-03-28 PROCEDURE — 6360000002 HC RX W HCPCS

## 2018-03-28 PROCEDURE — 6370000000 HC RX 637 (ALT 250 FOR IP): Performed by: STUDENT IN AN ORGANIZED HEALTH CARE EDUCATION/TRAINING PROGRAM

## 2018-03-28 PROCEDURE — 3700000000 HC ANESTHESIA ATTENDED CARE: Performed by: PODIATRIST

## 2018-03-28 PROCEDURE — 3600000014 HC SURGERY LEVEL 4 ADDTL 15MIN: Performed by: PODIATRIST

## 2018-03-28 PROCEDURE — C1713 ANCHOR/SCREW BN/BN,TIS/BN: HCPCS | Performed by: PODIATRIST

## 2018-03-28 PROCEDURE — 3600000004 HC SURGERY LEVEL 4 BASE: Performed by: PODIATRIST

## 2018-03-28 PROCEDURE — 2500000003 HC RX 250 WO HCPCS: Performed by: PODIATRIST

## 2018-03-28 PROCEDURE — 7100000011 HC PHASE II RECOVERY - ADDTL 15 MIN: Performed by: PODIATRIST

## 2018-03-28 PROCEDURE — 3700000001 HC ADD 15 MINUTES (ANESTHESIA): Performed by: PODIATRIST

## 2018-03-28 DEVICE — CANNULATED SCREW
Type: IMPLANTABLE DEVICE | Site: FOOT | Status: FUNCTIONAL
Brand: ASNIS

## 2018-03-28 DEVICE — IMPLANTABLE DEVICE: Type: IMPLANTABLE DEVICE | Site: FOOT | Status: FUNCTIONAL

## 2018-03-28 RX ORDER — ACETAMINOPHEN 325 MG/1
650 TABLET ORAL EVERY 4 HOURS PRN
Status: DISCONTINUED | OUTPATIENT
Start: 2018-03-28 | End: 2018-03-28 | Stop reason: HOSPADM

## 2018-03-28 RX ORDER — FENTANYL CITRATE 50 UG/ML
50 INJECTION, SOLUTION INTRAMUSCULAR; INTRAVENOUS EVERY 5 MIN PRN
Status: DISCONTINUED | OUTPATIENT
Start: 2018-03-28 | End: 2018-03-28 | Stop reason: HOSPADM

## 2018-03-28 RX ORDER — MEPERIDINE HYDROCHLORIDE 25 MG/ML
12.5 INJECTION INTRAMUSCULAR; INTRAVENOUS; SUBCUTANEOUS EVERY 5 MIN PRN
Status: DISCONTINUED | OUTPATIENT
Start: 2018-03-28 | End: 2018-03-28 | Stop reason: HOSPADM

## 2018-03-28 RX ORDER — OXYCODONE HYDROCHLORIDE AND ACETAMINOPHEN 5; 325 MG/1; MG/1
2 TABLET ORAL EVERY 4 HOURS PRN
Status: DISCONTINUED | OUTPATIENT
Start: 2018-03-28 | End: 2018-03-28 | Stop reason: HOSPADM

## 2018-03-28 RX ORDER — OXYCODONE HYDROCHLORIDE AND ACETAMINOPHEN 5; 325 MG/1; MG/1
TABLET ORAL
Status: DISCONTINUED
Start: 2018-03-28 | End: 2018-03-28 | Stop reason: HOSPADM

## 2018-03-28 RX ORDER — SODIUM CHLORIDE 0.9 % (FLUSH) 0.9 %
10 SYRINGE (ML) INJECTION EVERY 12 HOURS SCHEDULED
Status: DISCONTINUED | OUTPATIENT
Start: 2018-03-28 | End: 2018-03-28 | Stop reason: HOSPADM

## 2018-03-28 RX ORDER — OXYCODONE HYDROCHLORIDE AND ACETAMINOPHEN 5; 325 MG/1; MG/1
1 TABLET ORAL EVERY 4 HOURS PRN
Status: DISCONTINUED | OUTPATIENT
Start: 2018-03-28 | End: 2018-03-28 | Stop reason: HOSPADM

## 2018-03-28 RX ORDER — ONDANSETRON 4 MG/1
4 TABLET, ORALLY DISINTEGRATING ORAL EVERY 6 HOURS PRN
Status: DISCONTINUED | OUTPATIENT
Start: 2018-03-28 | End: 2018-03-28 | Stop reason: HOSPADM

## 2018-03-28 RX ORDER — FENTANYL CITRATE 50 UG/ML
INJECTION, SOLUTION INTRAMUSCULAR; INTRAVENOUS PRN
Status: DISCONTINUED | OUTPATIENT
Start: 2018-03-28 | End: 2018-03-28 | Stop reason: SDUPTHER

## 2018-03-28 RX ORDER — OXYCODONE HYDROCHLORIDE AND ACETAMINOPHEN 5; 325 MG/1; MG/1
1 TABLET ORAL EVERY 4 HOURS PRN
COMMUNITY
End: 2018-12-20

## 2018-03-28 RX ORDER — BUPIVACAINE HYDROCHLORIDE AND EPINEPHRINE 5; 5 MG/ML; UG/ML
INJECTION, SOLUTION EPIDURAL; INTRACAUDAL; PERINEURAL PRN
Status: DISCONTINUED | OUTPATIENT
Start: 2018-03-28 | End: 2018-03-28 | Stop reason: HOSPADM

## 2018-03-28 RX ORDER — SODIUM CHLORIDE 0.9 % (FLUSH) 0.9 %
10 SYRINGE (ML) INJECTION PRN
Status: DISCONTINUED | OUTPATIENT
Start: 2018-03-28 | End: 2018-03-28 | Stop reason: SDUPTHER

## 2018-03-28 RX ORDER — SODIUM CHLORIDE 0.9 % (FLUSH) 0.9 %
10 SYRINGE (ML) INJECTION PRN
Status: DISCONTINUED | OUTPATIENT
Start: 2018-03-28 | End: 2018-03-28 | Stop reason: HOSPADM

## 2018-03-28 RX ORDER — LABETALOL HYDROCHLORIDE 5 MG/ML
5 INJECTION, SOLUTION INTRAVENOUS EVERY 10 MIN PRN
Status: DISCONTINUED | OUTPATIENT
Start: 2018-03-28 | End: 2018-03-28 | Stop reason: HOSPADM

## 2018-03-28 RX ORDER — SODIUM CHLORIDE 9 MG/ML
INJECTION, SOLUTION INTRAVENOUS CONTINUOUS PRN
Status: DISCONTINUED | OUTPATIENT
Start: 2018-03-28 | End: 2018-03-28 | Stop reason: SDUPTHER

## 2018-03-28 RX ORDER — ONDANSETRON 2 MG/ML
4 INJECTION INTRAMUSCULAR; INTRAVENOUS EVERY 6 HOURS PRN
Status: DISCONTINUED | OUTPATIENT
Start: 2018-03-28 | End: 2018-03-28

## 2018-03-28 RX ORDER — ONDANSETRON 2 MG/ML
4 INJECTION INTRAMUSCULAR; INTRAVENOUS
Status: DISCONTINUED | OUTPATIENT
Start: 2018-03-28 | End: 2018-03-28 | Stop reason: HOSPADM

## 2018-03-28 RX ORDER — PROPOFOL 10 MG/ML
INJECTION, EMULSION INTRAVENOUS PRN
Status: DISCONTINUED | OUTPATIENT
Start: 2018-03-28 | End: 2018-03-28 | Stop reason: SDUPTHER

## 2018-03-28 RX ORDER — SODIUM CHLORIDE 0.9 % (FLUSH) 0.9 %
10 SYRINGE (ML) INJECTION EVERY 12 HOURS SCHEDULED
Status: DISCONTINUED | OUTPATIENT
Start: 2018-03-28 | End: 2018-03-28 | Stop reason: SDUPTHER

## 2018-03-28 RX ORDER — LIDOCAINE HYDROCHLORIDE 20 MG/ML
INJECTION, SOLUTION INFILTRATION; PERINEURAL PRN
Status: DISCONTINUED | OUTPATIENT
Start: 2018-03-28 | End: 2018-03-28 | Stop reason: SDUPTHER

## 2018-03-28 RX ORDER — MIDAZOLAM HYDROCHLORIDE 1 MG/ML
INJECTION INTRAMUSCULAR; INTRAVENOUS PRN
Status: DISCONTINUED | OUTPATIENT
Start: 2018-03-28 | End: 2018-03-28 | Stop reason: SDUPTHER

## 2018-03-28 RX ADMIN — PROPOFOL 50 MG: 10 INJECTION, EMULSION INTRAVENOUS at 13:43

## 2018-03-28 RX ADMIN — FENTANYL CITRATE 50 MCG: 50 INJECTION INTRAMUSCULAR; INTRAVENOUS at 14:03

## 2018-03-28 RX ADMIN — SODIUM CHLORIDE: 9 INJECTION, SOLUTION INTRAVENOUS at 13:37

## 2018-03-28 RX ADMIN — FENTANYL CITRATE 25 MCG: 50 INJECTION INTRAMUSCULAR; INTRAVENOUS at 14:52

## 2018-03-28 RX ADMIN — FENTANYL CITRATE 50 MCG: 50 INJECTION INTRAMUSCULAR; INTRAVENOUS at 13:41

## 2018-03-28 RX ADMIN — SODIUM CHLORIDE: 9 INJECTION, SOLUTION INTRAVENOUS at 15:28

## 2018-03-28 RX ADMIN — PROPOFOL 150 MG: 10 INJECTION, EMULSION INTRAVENOUS at 13:41

## 2018-03-28 RX ADMIN — MIDAZOLAM HYDROCHLORIDE 2 MG: 1 INJECTION, SOLUTION INTRAMUSCULAR; INTRAVENOUS at 13:38

## 2018-03-28 RX ADMIN — LIDOCAINE HYDROCHLORIDE 60 MG: 20 INJECTION, SOLUTION INFILTRATION; PERINEURAL at 13:41

## 2018-03-28 RX ADMIN — FENTANYL CITRATE 50 MCG: 50 INJECTION INTRAMUSCULAR; INTRAVENOUS at 13:46

## 2018-03-28 RX ADMIN — CEFAZOLIN SODIUM 2 G: 2 SOLUTION INTRAVENOUS at 13:49

## 2018-03-28 RX ADMIN — FENTANYL CITRATE 50 MCG: 50 INJECTION INTRAMUSCULAR; INTRAVENOUS at 14:20

## 2018-03-28 RX ADMIN — OXYCODONE HYDROCHLORIDE AND ACETAMINOPHEN 2 TABLET: 5; 325 TABLET ORAL at 16:35

## 2018-03-28 RX ADMIN — FENTANYL CITRATE 50 MCG: 50 INJECTION INTRAMUSCULAR; INTRAVENOUS at 13:43

## 2018-03-28 RX ADMIN — FENTANYL CITRATE 25 MCG: 50 INJECTION INTRAMUSCULAR; INTRAVENOUS at 15:09

## 2018-03-28 ASSESSMENT — PULMONARY FUNCTION TESTS
PIF_VALUE: 2
PIF_VALUE: 15
PIF_VALUE: 15
PIF_VALUE: 2
PIF_VALUE: 15
PIF_VALUE: 14
PIF_VALUE: 2
PIF_VALUE: 11
PIF_VALUE: 2
PIF_VALUE: 5
PIF_VALUE: 2
PIF_VALUE: 0
PIF_VALUE: 15
PIF_VALUE: 15
PIF_VALUE: 2
PIF_VALUE: 20
PIF_VALUE: 13
PIF_VALUE: 19
PIF_VALUE: 15
PIF_VALUE: 15
PIF_VALUE: 14
PIF_VALUE: 2
PIF_VALUE: 15
PIF_VALUE: 2
PIF_VALUE: 2
PIF_VALUE: 15
PIF_VALUE: 2
PIF_VALUE: 15
PIF_VALUE: 2
PIF_VALUE: 3
PIF_VALUE: 3
PIF_VALUE: 2
PIF_VALUE: 14
PIF_VALUE: 15
PIF_VALUE: 16
PIF_VALUE: 2
PIF_VALUE: 2
PIF_VALUE: 15
PIF_VALUE: 15
PIF_VALUE: 2
PIF_VALUE: 2
PIF_VALUE: 14
PIF_VALUE: 2
PIF_VALUE: 2
PIF_VALUE: 15
PIF_VALUE: 2
PIF_VALUE: 2
PIF_VALUE: 15
PIF_VALUE: 15
PIF_VALUE: 4
PIF_VALUE: 15
PIF_VALUE: 2
PIF_VALUE: 15
PIF_VALUE: 12
PIF_VALUE: 2
PIF_VALUE: 1
PIF_VALUE: 14
PIF_VALUE: 17
PIF_VALUE: 15
PIF_VALUE: 2
PIF_VALUE: 17
PIF_VALUE: 15
PIF_VALUE: 2
PIF_VALUE: 15
PIF_VALUE: 7
PIF_VALUE: 2
PIF_VALUE: 15
PIF_VALUE: 15
PIF_VALUE: 2
PIF_VALUE: 15
PIF_VALUE: 17
PIF_VALUE: 0
PIF_VALUE: 14
PIF_VALUE: 2
PIF_VALUE: 5
PIF_VALUE: 2
PIF_VALUE: 15
PIF_VALUE: 2
PIF_VALUE: 4
PIF_VALUE: 2
PIF_VALUE: 15
PIF_VALUE: 4
PIF_VALUE: 15
PIF_VALUE: 2
PIF_VALUE: 1
PIF_VALUE: 16
PIF_VALUE: 2
PIF_VALUE: 16
PIF_VALUE: 2
PIF_VALUE: 15
PIF_VALUE: 3
PIF_VALUE: 2
PIF_VALUE: 15
PIF_VALUE: 2
PIF_VALUE: 15
PIF_VALUE: 15
PIF_VALUE: 4
PIF_VALUE: 12
PIF_VALUE: 2
PIF_VALUE: 3
PIF_VALUE: 1
PIF_VALUE: 14
PIF_VALUE: 3
PIF_VALUE: 2

## 2018-03-28 ASSESSMENT — PAIN - FUNCTIONAL ASSESSMENT: PAIN_FUNCTIONAL_ASSESSMENT: 0-10

## 2018-03-28 ASSESSMENT — PAIN SCALES - GENERAL
PAINLEVEL_OUTOF10: 0
PAINLEVEL_OUTOF10: 10

## 2018-03-28 NOTE — ANESTHESIA PRE PROCEDURE
Department of Anesthesiology  Preprocedure Note       Name:  Cherylene Litter   Age:  33 y.o.  :  1990                                          MRN:  [id number]         Date:  3/28/2018      Surgeon: Ke Painter):  Arnol Cedeno DPM    Procedure: Procedure(s):  MEDIAL CALCANEAL DISPLACEMENT OSTEOTOMY RIGHT FOOT, GASTROCNEMIUS LENGTHENING RIGHT LEG, COTTON OSTEOTOMY FIRST CUNEIFORM RIGHT FOOT WITH FORWEB, POSSIBLE PEPE CALCANEAL OSTEOTOMY RIGHT FOOT    Medications prior to admission:   Prior to Admission medications    Medication Sig Start Date End Date Taking? Authorizing Provider   oxyCODONE-acetaminophen (PERCOCET) 5-325 MG per tablet Take 1 tablet by mouth every 4 hours as needed for Pain. Yes Historical Provider, MD   acetaminophen (TYLENOL) 325 MG tablet Take 650 mg by mouth every 6 hours as needed for Pain   Yes Historical Provider, MD   norethindrone (NORLYDA) 0.35 MG tablet Take 1 tablet by mouth daily   Yes Historical Provider, MD   ADVAIR -21 MCG/ACT inhaler Inhale 1 puff into the lungs 2 times daily. 13  Yes Historical Provider, MD   albuterol (PROVENTIL HFA) 108 (90 BASE) MCG/ACT inhaler Inhale 2 puffs into the lungs every 4 hours as needed for Wheezing or Shortness of Breath (Space out to every 6 hours as symptoms improve). Space out to every 6 hours as symptoms improve. 3/2/13  Yes Don Harris, DO   PRENATAL VIT-DSS-FE FUM-FA PO Take  by mouth.       Historical Provider, MD       Current medications:    Current Facility-Administered Medications   Medication Dose Route Frequency Provider Last Rate Last Dose    sodium chloride flush 0.9 % injection 10 mL  10 mL Intravenous 2 times per day Lila Loja DPM        sodium chloride flush 0.9 % injection 10 mL  10 mL Intravenous PRN Lila Loja DPM        ceFAZolin (ANCEF) 2 g in dextrose 3 % 50 mL IVPB (duplex)  2 g Intravenous On Call to ASHA Plummer, DPM           Allergies:  No Known Allergies    Problem Time of last solid consumption: 2300                        Date of last liquid consumption: 03/27/18                        Date of last solid food consumption: 03/27/18    BMI:   Wt Readings from Last 3 Encounters:   03/28/18 144 lb 6.4 oz (65.5 kg)   02/14/18 150 lb 6.4 oz (68.2 kg)   09/27/17 155 lb (70.3 kg)     Body mass index is 23.31 kg/m². CBC:   Lab Results   Component Value Date    WBC 5.2 01/18/2018    RBC 4.76 01/18/2018    RBC 4.63 08/12/2016    HGB 12.4 01/18/2018    HCT 38.3 01/18/2018    MCV 80.4 01/18/2018    RDW 14.1 01/18/2018     01/18/2018       CMP:   Lab Results   Component Value Date     01/18/2018    K 4.8 01/18/2018     01/18/2018    CO2 22 01/18/2018    BUN 14 01/18/2018    CREATININE 0.6 01/18/2018    LABGLOM >90 01/18/2018    GLUCOSE 84 01/18/2018    PROT 7.4 01/18/2018    CALCIUM 9.3 01/18/2018    BILITOT 0.2 01/18/2018    ALKPHOS 99 01/18/2018    AST 20 01/18/2018    ALT 16 01/18/2018       POC Tests: No results for input(s): POCGLU, POCNA, POCK, POCCL, POCBUN, POCHEMO, POCHCT in the last 72 hours.     Coags:   Lab Results   Component Value Date    INR 0.92 09/15/2015       HCG (If Applicable):   Lab Results   Component Value Date    PREGTESTUR negative 02/14/2018    PREGSERUM NEGATIVE 06/21/2016        ABGs: No results found for: PHART, PO2ART, JOQ1OLX, AOW1NBA, BEART, Q4RRRHRM     Type & Screen (If Applicable):  Lab Results   Component Value Date    LABABO O 08/12/2016    [address] POS 02/21/2017       Anesthesia Evaluation  Patient summary reviewed and Nursing notes reviewed no history of anesthetic complications:   Airway: Mallampati: II  TM distance: >3 FB   Neck ROM: full  Mouth opening: > = 3 FB Dental:          Pulmonary:normal exam  breath sounds clear to auscultation  (+) asthma:                            Cardiovascular:Negative CV ROS  Exercise tolerance: good (>4 METS),                     Neuro/Psych:   (+) headaches:,

## 2018-03-28 NOTE — ANESTHESIA POSTPROCEDURE EVALUATION
Department of Anesthesiology  Postprocedure Note    Patient: Faiza Ortiz  MRN: [id number]  YOB: 1990  Date of evaluation: 3/28/2018  Time:  4:07 PM     Procedure Summary     Date:  03/28/18 Room / Location:  [facility] OR 01 / [address]    Anesthesia Start:  9868 Anesthesia Stop:  8054    Procedure:  MEDIAL CALCANEAL DISPLACEMENT OSTEOTOMY RIGHT FOOT, GASTROCNEMIUS LENGTHENING RIGHT LEG, COTTON OSTEOTOMY FIRST CUNEIFORM RIGHT FOOT WITH FORWEB RIGHT FOOT (Right ) Diagnosis:  (SUBLUXATION OF TARSAL JOINT RIGHT FOOT SUBSEQUENT ENCOUNTER, OTHER ACQUIRED DEFORMITIES OF UNSP FOOT RIGHT I E CAVUS FLATFOOT, SUBLUXATION OF TARSAL JOINT OF RIGHT FOOT, CONGENITAL TALIPES CALCANEOVALGUS, ACQUIRED DEFORMITY OF RIGHT LOWER LEG)    Surgeon:  Kourtney Ayala DPM Responsible Provider:  Radha Aparicio DO    Anesthesia Type:  general ASA Status:  2          Anesthesia Type: general    Earl Phase I: Earl Score: 9    Earl Phase II:      Last vitals: Reviewed and per EMR flowsheets.        Anesthesia Post Evaluation    Patient location during evaluation: PACU  Patient participation: complete - patient participated  Level of consciousness: sleepy but conscious, responsive to verbal stimuli and responsive to light touch  Pain score: 2  Airway patency: patent  Nausea & Vomiting: no nausea and no vomiting  Complications: no  Cardiovascular status: hemodynamically stable and blood pressure returned to baseline  Respiratory status: spontaneous ventilation, room air and acceptable  Hydration status: stable

## 2018-12-20 ENCOUNTER — HOSPITAL ENCOUNTER (EMERGENCY)
Age: 28
Discharge: HOME | End: 2018-12-20
Payer: COMMERCIAL

## 2018-12-20 VITALS
BODY MASS INDEX: 22.5 KG/M2 | TEMPERATURE: 98.3 F | HEART RATE: 80 BPM | OXYGEN SATURATION: 99 % | DIASTOLIC BLOOD PRESSURE: 66 MMHG | WEIGHT: 140 LBS | HEIGHT: 66 IN | SYSTOLIC BLOOD PRESSURE: 128 MMHG | RESPIRATION RATE: 16 BRPM

## 2018-12-20 DIAGNOSIS — J06.9 ACUTE UPPER RESPIRATORY INFECTION: Primary | ICD-10-CM

## 2018-12-20 LAB
GROUP A STREP CULTURE, REFLEX: NEGATIVE
REFLEX THROAT C + S: NORMAL

## 2018-12-20 PROCEDURE — 87070 CULTURE OTHR SPECIMN AEROBIC: CPT

## 2018-12-20 PROCEDURE — 99213 OFFICE O/P EST LOW 20 MIN: CPT

## 2018-12-20 PROCEDURE — 99213 OFFICE O/P EST LOW 20 MIN: CPT | Performed by: NURSE PRACTITIONER

## 2018-12-20 RX ORDER — LORATADINE 10 MG/1
10 TABLET ORAL DAILY
Qty: 30 TABLET | Refills: 0 | Status: SHIPPED | OUTPATIENT
Start: 2018-12-20 | End: 2020-01-21

## 2018-12-20 RX ORDER — PREDNISONE 20 MG/1
40 TABLET ORAL DAILY
Qty: 6 TABLET | Refills: 0 | Status: SHIPPED | OUTPATIENT
Start: 2018-12-20 | End: 2018-12-23

## 2018-12-20 ASSESSMENT — ENCOUNTER SYMPTOMS
RHINORRHEA: 1
SHORTNESS OF BREATH: 0
SINUS PRESSURE: 0
WHEEZING: 0
DIARRHEA: 0
NAUSEA: 0
COUGH: 1
VOMITING: 0
SORE THROAT: 1

## 2018-12-20 ASSESSMENT — PAIN DESCRIPTION - DESCRIPTORS: DESCRIPTORS: SORE

## 2018-12-20 ASSESSMENT — PAIN DESCRIPTION - PAIN TYPE: TYPE: ACUTE PAIN

## 2018-12-20 ASSESSMENT — PAIN DESCRIPTION - LOCATION: LOCATION: THROAT

## 2018-12-20 ASSESSMENT — PAIN SCALES - GENERAL: PAINLEVEL_OUTOF10: 7

## 2018-12-20 NOTE — ED TRIAGE NOTES
Pt complains of having sore throat for 2 weeks states she has tried olive leaf extract it did help for a while but the pain is back. Pt state she has not tried any other medications. Pt is still nursing and currently trying for another baby.

## 2018-12-22 LAB — THROAT/NOSE CULTURE: NORMAL

## 2019-02-01 ENCOUNTER — APPOINTMENT (OUTPATIENT)
Dept: GENERAL RADIOLOGY | Age: 29
End: 2019-02-01
Payer: COMMERCIAL

## 2019-02-01 ENCOUNTER — HOSPITAL ENCOUNTER (EMERGENCY)
Age: 29
Discharge: HOME | End: 2019-02-01
Attending: EMERGENCY MEDICINE
Payer: COMMERCIAL

## 2019-02-01 VITALS
RESPIRATION RATE: 18 BRPM | TEMPERATURE: 99.9 F | HEART RATE: 130 BPM | DIASTOLIC BLOOD PRESSURE: 71 MMHG | SYSTOLIC BLOOD PRESSURE: 99 MMHG | OXYGEN SATURATION: 98 %

## 2019-02-01 DIAGNOSIS — J18.9 PNEUMONIA DUE TO ORGANISM: Primary | ICD-10-CM

## 2019-02-01 DIAGNOSIS — J40 BRONCHITIS: ICD-10-CM

## 2019-02-01 LAB
ANION GAP SERPL CALCULATED.3IONS-SCNC: 14 MEQ/L (ref 8–16)
BASOPHILS # BLD: 0.3 %
BASOPHILS ABSOLUTE: 0 THOU/MM3 (ref 0–0.1)
BUN BLDV-MCNC: 7 MG/DL (ref 7–22)
CALCIUM SERPL-MCNC: 8.6 MG/DL (ref 8.5–10.5)
CHLORIDE BLD-SCNC: 100 MEQ/L (ref 98–111)
CO2: 20 MEQ/L (ref 23–33)
CREAT SERPL-MCNC: 0.7 MG/DL (ref 0.4–1.2)
EOSINOPHIL # BLD: 0 %
EOSINOPHILS ABSOLUTE: 0 THOU/MM3 (ref 0–0.4)
ERYTHROCYTE [DISTWIDTH] IN BLOOD BY AUTOMATED COUNT: 13.1 % (ref 11.5–14.5)
ERYTHROCYTE [DISTWIDTH] IN BLOOD BY AUTOMATED COUNT: 40.7 FL (ref 35–45)
GFR SERPL CREATININE-BSD FRML MDRD: > 90 ML/MIN/1.73M2
GLUCOSE BLD-MCNC: 84 MG/DL (ref 70–108)
HCT VFR BLD CALC: 45.7 % (ref 37–47)
HEMOGLOBIN: 13.9 GM/DL (ref 12–16)
IMMATURE GRANS (ABS): 0.01 THOU/MM3 (ref 0–0.07)
IMMATURE GRANULOCYTES: 0.3 %
LACTIC ACID: 2.4 MMOL/L (ref 0.5–2.2)
LYMPHOCYTES # BLD: 42.9 %
LYMPHOCYTES ABSOLUTE: 1.6 THOU/MM3 (ref 1–4.8)
MCH RBC QN AUTO: 26.4 PG (ref 26–33)
MCHC RBC AUTO-ENTMCNC: 30.4 GM/DL (ref 32.2–35.5)
MCV RBC AUTO: 86.7 FL (ref 81–99)
MONOCYTES # BLD: 7.7 %
MONOCYTES ABSOLUTE: 0.3 THOU/MM3 (ref 0.4–1.3)
NUCLEATED RED BLOOD CELLS: 0 /100 WBC
OSMOLALITY CALCULATION: 265.4 MOSMOL/KG (ref 275–300)
PLATELET # BLD: 191 THOU/MM3 (ref 130–400)
PMV BLD AUTO: 11 FL (ref 9.4–12.4)
POTASSIUM SERPL-SCNC: 4.3 MEQ/L (ref 3.5–5.2)
RBC # BLD: 5.27 MILL/MM3 (ref 4.2–5.4)
SEG NEUTROPHILS: 48.8 %
SEGMENTED NEUTROPHILS ABSOLUTE COUNT: 1.9 THOU/MM3 (ref 1.8–7.7)
SODIUM BLD-SCNC: 134 MEQ/L (ref 135–145)
WBC # BLD: 3.8 THOU/MM3 (ref 4.8–10.8)

## 2019-02-01 PROCEDURE — 99284 EMERGENCY DEPT VISIT MOD MDM: CPT

## 2019-02-01 PROCEDURE — 85025 COMPLETE CBC W/AUTO DIFF WBC: CPT

## 2019-02-01 PROCEDURE — 87040 BLOOD CULTURE FOR BACTERIA: CPT

## 2019-02-01 PROCEDURE — 6360000002 HC RX W HCPCS: Performed by: EMERGENCY MEDICINE

## 2019-02-01 PROCEDURE — 2580000003 HC RX 258: Performed by: EMERGENCY MEDICINE

## 2019-02-01 PROCEDURE — 6370000000 HC RX 637 (ALT 250 FOR IP): Performed by: EMERGENCY MEDICINE

## 2019-02-01 PROCEDURE — 94640 AIRWAY INHALATION TREATMENT: CPT

## 2019-02-01 PROCEDURE — 96365 THER/PROPH/DIAG IV INF INIT: CPT

## 2019-02-01 PROCEDURE — 83605 ASSAY OF LACTIC ACID: CPT

## 2019-02-01 PROCEDURE — 36415 COLL VENOUS BLD VENIPUNCTURE: CPT

## 2019-02-01 PROCEDURE — 71046 X-RAY EXAM CHEST 2 VIEWS: CPT

## 2019-02-01 PROCEDURE — 80048 BASIC METABOLIC PNL TOTAL CA: CPT

## 2019-02-01 RX ORDER — CEFUROXIME AXETIL 500 MG/1
500 TABLET ORAL 2 TIMES DAILY
Qty: 20 TABLET | Refills: 0 | Status: SHIPPED | OUTPATIENT
Start: 2019-02-01 | End: 2019-02-11

## 2019-02-01 RX ORDER — 0.9 % SODIUM CHLORIDE 0.9 %
500 INTRAVENOUS SOLUTION INTRAVENOUS ONCE
Status: COMPLETED | OUTPATIENT
Start: 2019-02-01 | End: 2019-02-01

## 2019-02-01 RX ORDER — SODIUM CHLORIDE 9 MG/ML
INJECTION, SOLUTION INTRAVENOUS CONTINUOUS
Status: DISCONTINUED | OUTPATIENT
Start: 2019-02-01 | End: 2019-02-01 | Stop reason: HOSPADM

## 2019-02-01 RX ORDER — IPRATROPIUM BROMIDE AND ALBUTEROL SULFATE 2.5; .5 MG/3ML; MG/3ML
1 SOLUTION RESPIRATORY (INHALATION) ONCE
Status: COMPLETED | OUTPATIENT
Start: 2019-02-01 | End: 2019-02-01

## 2019-02-01 RX ADMIN — CEFTRIAXONE SODIUM 1 G: 1 INJECTION, POWDER, FOR SOLUTION INTRAMUSCULAR; INTRAVENOUS at 17:42

## 2019-02-01 RX ADMIN — IPRATROPIUM BROMIDE AND ALBUTEROL SULFATE 1 AMPULE: .5; 3 SOLUTION RESPIRATORY (INHALATION) at 17:09

## 2019-02-01 RX ADMIN — SODIUM CHLORIDE 500 ML: 9 INJECTION, SOLUTION INTRAVENOUS at 17:43

## 2019-02-01 ASSESSMENT — ENCOUNTER SYMPTOMS
EYE DISCHARGE: 0
BACK PAIN: 0
COUGH: 1
RHINORRHEA: 1
SHORTNESS OF BREATH: 0
EYE PAIN: 0
WHEEZING: 0
ABDOMINAL PAIN: 0
DIARRHEA: 0
NAUSEA: 1
SORE THROAT: 1
VOMITING: 0

## 2019-02-01 ASSESSMENT — PAIN DESCRIPTION - PAIN TYPE: TYPE: ACUTE PAIN

## 2019-02-01 ASSESSMENT — PAIN SCALES - GENERAL: PAINLEVEL_OUTOF10: 5

## 2019-02-01 ASSESSMENT — PAIN DESCRIPTION - LOCATION: LOCATION: THROAT

## 2019-02-07 LAB
BLOOD CULTURE, ROUTINE: NORMAL
BLOOD CULTURE, ROUTINE: NORMAL

## 2019-04-20 ENCOUNTER — HOSPITAL ENCOUNTER (EMERGENCY)
Age: 29
Discharge: HOME | End: 2019-04-20
Payer: COMMERCIAL

## 2019-04-20 ENCOUNTER — APPOINTMENT (OUTPATIENT)
Dept: ULTRASOUND IMAGING | Age: 29
End: 2019-04-20
Payer: COMMERCIAL

## 2019-04-20 VITALS
WEIGHT: 140 LBS | HEART RATE: 78 BPM | TEMPERATURE: 98.3 F | DIASTOLIC BLOOD PRESSURE: 74 MMHG | RESPIRATION RATE: 19 BRPM | OXYGEN SATURATION: 100 % | BODY MASS INDEX: 22.6 KG/M2 | SYSTOLIC BLOOD PRESSURE: 114 MMHG

## 2019-04-20 DIAGNOSIS — B37.31 VAGINAL YEAST INFECTION: ICD-10-CM

## 2019-04-20 DIAGNOSIS — O30.001 TWIN GESTATION IN FIRST TRIMESTER, UNSPECIFIED MULTIPLE GESTATION TYPE: Primary | ICD-10-CM

## 2019-04-20 DIAGNOSIS — O20.9 VAGINAL BLEEDING IN PREGNANCY, FIRST TRIMESTER: ICD-10-CM

## 2019-04-20 DIAGNOSIS — B96.89 BV (BACTERIAL VAGINOSIS): ICD-10-CM

## 2019-04-20 DIAGNOSIS — N76.0 BV (BACTERIAL VAGINOSIS): ICD-10-CM

## 2019-04-20 LAB
ABO CHECK: NORMAL
ALBUMIN SERPL-MCNC: 3.7 G/DL (ref 3.5–5.1)
ALP BLD-CCNC: 60 U/L (ref 38–126)
ALT SERPL-CCNC: 13 U/L (ref 11–66)
AMPHETAMINE+METHAMPHETAMINE URINE SCREEN: NEGATIVE
ANION GAP SERPL CALCULATED.3IONS-SCNC: 10 MEQ/L (ref 8–16)
AST SERPL-CCNC: 15 U/L (ref 5–40)
BACTERIA: NORMAL /HPF
BARBITURATE QUANTITATIVE URINE: NEGATIVE
BASOPHILS # BLD: 0.6 %
BASOPHILS ABSOLUTE: 0 THOU/MM3 (ref 0–0.1)
BENZODIAZEPINE QUANTITATIVE URINE: NEGATIVE
BILIRUB SERPL-MCNC: 0.2 MG/DL (ref 0.3–1.2)
BILIRUBIN URINE: NEGATIVE
BLOOD, URINE: NEGATIVE
BUN BLDV-MCNC: 13 MG/DL (ref 7–22)
CALCIUM SERPL-MCNC: 8.7 MG/DL (ref 8.5–10.5)
CANNABINOID QUANTITATIVE URINE: NEGATIVE
CASTS 2: NORMAL /LPF
CASTS UA: NORMAL /LPF
CHARACTER, URINE: NORMAL
CHLAMYDIA TRACHOMATIS BY RT-PCR: NOT DETECTED
CHLORIDE BLD-SCNC: 109 MEQ/L (ref 98–111)
CO2: 18 MEQ/L (ref 23–33)
COCAINE METABOLITE QUANTITATIVE URINE: NEGATIVE
COLOR: YELLOW
CREAT SERPL-MCNC: 0.6 MG/DL (ref 0.4–1.2)
CRYSTALS, UA: NORMAL
CT/NG SOURCE: NORMAL
EOSINOPHIL # BLD: 4.5 %
EOSINOPHILS ABSOLUTE: 0.3 THOU/MM3 (ref 0–0.4)
EPITHELIAL CELLS, UA: NORMAL /HPF
ERYTHROCYTE [DISTWIDTH] IN BLOOD BY AUTOMATED COUNT: 13.8 % (ref 11.5–14.5)
ERYTHROCYTE [DISTWIDTH] IN BLOOD BY AUTOMATED COUNT: 43.5 FL (ref 35–45)
GFR SERPL CREATININE-BSD FRML MDRD: > 90 ML/MIN/1.73M2
GLUCOSE BLD-MCNC: 80 MG/DL (ref 70–108)
GLUCOSE URINE: NEGATIVE MG/DL
HCG,BETA SUBUNIT,QUAL,SERUM: ABNORMAL MIU/ML (ref 0–5)
HCT VFR BLD CALC: 41.2 % (ref 37–47)
HEMOGLOBIN: 12.5 GM/DL (ref 12–16)
IMMATURE GRANS (ABS): 0.01 THOU/MM3 (ref 0–0.07)
IMMATURE GRANULOCYTES: 0.2 %
KETONES, URINE: NEGATIVE
KOH PREP: NORMAL
LEUKOCYTE ESTERASE, URINE: NEGATIVE
LYMPHOCYTES # BLD: 35.8 %
LYMPHOCYTES ABSOLUTE: 2.4 THOU/MM3 (ref 1–4.8)
MCH RBC QN AUTO: 26.5 PG (ref 26–33)
MCHC RBC AUTO-ENTMCNC: 30.3 GM/DL (ref 32.2–35.5)
MCV RBC AUTO: 87.3 FL (ref 81–99)
MISCELLANEOUS 2: NORMAL
MONOCYTES # BLD: 7.5 %
MONOCYTES ABSOLUTE: 0.5 THOU/MM3 (ref 0.4–1.3)
NEISSERIA GONORRHOEAE BY RT-PCR: NOT DETECTED
NITRITE, URINE: NEGATIVE
NUCLEATED RED BLOOD CELLS: 0 /100 WBC
OPIATES, URINE: NEGATIVE
OSMOLALITY CALCULATION: 272.9 MOSMOL/KG (ref 275–300)
OXYCODONE: NEGATIVE
PH UA: 7.5 (ref 5–9)
PHENCYCLIDINE QUANTITATIVE URINE: NEGATIVE
PLATELET # BLD: 289 THOU/MM3 (ref 130–400)
PMV BLD AUTO: 10.1 FL (ref 9.4–12.4)
POTASSIUM SERPL-SCNC: 3.8 MEQ/L (ref 3.5–5.2)
PROTEIN UA: NEGATIVE
RBC # BLD: 4.72 MILL/MM3 (ref 4.2–5.4)
RBC URINE: NORMAL /HPF
RENAL EPITHELIAL, UA: NORMAL
RH FACTOR: NORMAL
SEG NEUTROPHILS: 51.4 %
SEGMENTED NEUTROPHILS ABSOLUTE COUNT: 3.4 THOU/MM3 (ref 1.8–7.7)
SODIUM BLD-SCNC: 137 MEQ/L (ref 135–145)
SPECIFIC GRAVITY, URINE: 1.02 (ref 1–1.03)
TOTAL PROTEIN: 7.1 G/DL (ref 6.1–8)
TRICHOMONAS PREP: NORMAL
UROBILINOGEN, URINE: 0.2 EU/DL (ref 0–1)
WBC # BLD: 6.7 THOU/MM3 (ref 4.8–10.8)
WBC UA: NORMAL /HPF
YEAST: NORMAL

## 2019-04-20 PROCEDURE — 84702 CHORIONIC GONADOTROPIN TEST: CPT

## 2019-04-20 PROCEDURE — 87070 CULTURE OTHR SPECIMN AEROBIC: CPT

## 2019-04-20 PROCEDURE — 85025 COMPLETE CBC W/AUTO DIFF WBC: CPT

## 2019-04-20 PROCEDURE — 87210 SMEAR WET MOUNT SALINE/INK: CPT

## 2019-04-20 PROCEDURE — 87205 SMEAR GRAM STAIN: CPT

## 2019-04-20 PROCEDURE — 87106 FUNGI IDENTIFICATION YEAST: CPT

## 2019-04-20 PROCEDURE — 80053 COMPREHEN METABOLIC PANEL: CPT

## 2019-04-20 PROCEDURE — 6370000000 HC RX 637 (ALT 250 FOR IP): Performed by: PHYSICIAN ASSISTANT

## 2019-04-20 PROCEDURE — 76817 TRANSVAGINAL US OBSTETRIC: CPT

## 2019-04-20 PROCEDURE — 87491 CHLMYD TRACH DNA AMP PROBE: CPT

## 2019-04-20 PROCEDURE — 36415 COLL VENOUS BLD VENIPUNCTURE: CPT

## 2019-04-20 PROCEDURE — 76802 OB US < 14 WKS ADDL FETUS: CPT

## 2019-04-20 PROCEDURE — 86901 BLOOD TYPING SEROLOGIC RH(D): CPT

## 2019-04-20 PROCEDURE — 99284 EMERGENCY DEPT VISIT MOD MDM: CPT

## 2019-04-20 PROCEDURE — 2709999900 HC NON-CHARGEABLE SUPPLY

## 2019-04-20 PROCEDURE — 80307 DRUG TEST PRSMV CHEM ANLYZR: CPT

## 2019-04-20 PROCEDURE — 87220 TISSUE EXAM FOR FUNGI: CPT

## 2019-04-20 PROCEDURE — 87591 N.GONORRHOEAE DNA AMP PROB: CPT

## 2019-04-20 PROCEDURE — 51701 INSERT BLADDER CATHETER: CPT

## 2019-04-20 PROCEDURE — 81001 URINALYSIS AUTO W/SCOPE: CPT

## 2019-04-20 PROCEDURE — 86900 BLOOD TYPING SEROLOGIC ABO: CPT

## 2019-04-20 RX ORDER — METRONIDAZOLE 500 MG/1
500 TABLET ORAL 2 TIMES DAILY
Qty: 14 TABLET | Refills: 0 | Status: SHIPPED | OUTPATIENT
Start: 2019-04-20 | End: 2019-04-27

## 2019-04-20 RX ORDER — ACETAMINOPHEN 325 MG/1
650 TABLET ORAL ONCE
Status: COMPLETED | OUTPATIENT
Start: 2019-04-20 | End: 2019-04-20

## 2019-04-20 RX ADMIN — ACETAMINOPHEN 650 MG: 325 TABLET ORAL at 14:15

## 2019-04-20 ASSESSMENT — ENCOUNTER SYMPTOMS
RHINORRHEA: 0
EYE PAIN: 0
WHEEZING: 0
COUGH: 0
VOMITING: 1
ABDOMINAL PAIN: 1
DIARRHEA: 0
BACK PAIN: 0
SHORTNESS OF BREATH: 0
SORE THROAT: 0
EYE DISCHARGE: 0

## 2019-04-20 ASSESSMENT — PAIN DESCRIPTION - LOCATION: LOCATION: ABDOMEN

## 2019-04-20 ASSESSMENT — PAIN DESCRIPTION - DESCRIPTORS: DESCRIPTORS: CRAMPING

## 2019-04-20 ASSESSMENT — PAIN SCALES - GENERAL: PAINLEVEL_OUTOF10: 6

## 2019-04-20 ASSESSMENT — PAIN DESCRIPTION - FREQUENCY: FREQUENCY: CONTINUOUS

## 2019-04-20 ASSESSMENT — PAIN DESCRIPTION - PAIN TYPE: TYPE: ACUTE PAIN

## 2019-04-20 NOTE — ED NOTES
Patient presents to the emergency dept with c/o abdominal cramping and vaginal bleeding that started up today. Patient states that she is about seven weeks pregnant, but she has not seen her OB/GYN or has not had an ultrasound to confirm location of the pregnancy.       Jac Flores RN  04/20/19 2469

## 2019-04-20 NOTE — ED PROVIDER NOTES
[facility]  eMERGENCY dEPARTMENT eNCOUnter          [address] [facility]       Chief Complaint   Patient presents with    Vaginal Bleeding     7 weeks pregnant       Nurses Notes reviewed and I agree except as noted in the HPI. HISTORY OF PRESENT ILLNESS    Kevan Barclay is a 33 y.o. female who presents to the Emergency Department for the evaluation of vaginal bleeding. The patient reports that she is currently 7 weeks pregnant. She is a2 and is followed by Dr. Liyah London GYN). She states that she started having lower abdominal cramping which began today and has also noted spotting. She states that when she wipes after using the bathroom, she noticed a pink tinge. She rates her pain at a 6/10 in severity and describes it as cramping in character. Reports the pain was initially intermittent and  is now more constant. She denies smoking or concern of STI. She is sexually active with one partner. She denies taking anything for cramping pain. She denies history of appendectomy. She also complains of having nausea and vomiting, 1 episode, earlier but denies experiencing currently and states believes it is due to morning sickness. She denies dysuria, urinary frequency, back pain, fevers, chills, or change from normal bowel habits. Patient denies any further complaints at initial encounter. The HPI was provided by the patient. REVIEW OF SYSTEMS     Review of Systems   Constitutional: Negative for appetite change, chills, diaphoresis, fatigue and fever. HENT: Negative for congestion, ear pain, postnasal drip, rhinorrhea, sinus pressure, sinus pain, sore throat, trouble swallowing and voice change. Eyes: Negative for pain, discharge and visual disturbance. Respiratory: Negative for cough, shortness of breath, wheezing and stridor. Cardiovascular: Negative for chest pain, palpitations and leg swelling.    Gastrointestinal: Positive for abdominal pain (cramping), nausea and vomiting. Negative for abdominal distention, anal bleeding, blood in stool, constipation, diarrhea and rectal pain. Genitourinary: Positive for vaginal bleeding (spotting). Negative for decreased urine volume, difficulty urinating, dysuria, flank pain, frequency, hematuria, urgency, vaginal discharge and vaginal pain. Musculoskeletal: Negative for arthralgias, back pain, joint swelling, neck pain and neck stiffness. Skin: Negative for pallor and rash. Neurological: Negative for dizziness, syncope, weakness, light-headedness, numbness and headaches. Hematological: Negative for adenopathy. Psychiatric/Behavioral: Negative for confusion and suicidal ideas. The patient is not nervous/anxious. PAST MEDICAL HISTORY    has a past medical history of Anemia, Asthma, Constipation, Headache(784.0), Interstitial cystitis, and Sickle cell anemia ([facility] [address] SURGICAL HISTORY      has a past surgical history that includes Ankle surgery; Colonoscopy (2013); laparoscopy (7/1/13); [place] tooth extraction (2014); Cystocopy (9/28/15); Foot surgery (Bilateral, 06/22/2016); Dilation and curettage of uterus; Foot surgery (Left, 09/27/2017); pr gastrocnemius recession (Left, 9/27/2017); other surgical history (Left, 02/14/2018); pr removal deep implant (Left, 2/14/2018); Foot surgery (Right, 03/28/2018); and pr full excis 5th metatarsal head (Right, 3/28/2018). CURRENT MEDICATIONS        Discharge Medication List as of 4/20/2019  3:13 PM      CONTINUE these medications which have NOT CHANGED    Details   loratadine (CLARITIN) 10 MG tablet Take 1 tablet by mouth daily, Disp-30 tablet, R-0Normal      ADVAIR -21 MCG/ACT inhaler Inhale 1 puff into the lungs 2 times daily. Historical Med      albuterol (PROVENTIL HFA) 108 (90 BASE) MCG/ACT inhaler Inhale 2 puffs into the lungs every 4 hours as needed for Wheezing or Shortness of Breath (Space out to every 6 hours as symptoms improve).  Space out to every 6 hours as symptoms improve., Disp-1 Inhaler, R-0Print      PRENATAL VIT-DSS-FE FUM-FA PO Take  by mouth. Historical Med      acetaminophen (TYLENOL) 325 MG tablet Take 650 mg by mouth every 6 hours as needed for PainHistorical Med      norethindrone (NORLYDA) 0.35 MG tablet Take 1 tablet by mouth dailyHistorical Med             ALLERGIES   has No Known Allergies. FAMILY HISTORY     indicated that her mother is alive. She indicated that her father is alive. She indicated that the status of her maternal grandmother is unknown. She indicated that her maternal grandfather is . family history includes Asthma in her father and mother; Depression in her mother; Heart Disease in her maternal grandmother; High Blood Pressure in her maternal grandmother. SOCIAL HISTORY      reports that she has never smoked. She has never used smokeless tobacco. She reports that she does not drink alcohol or use drugs. PHYSICAL EXAM     INITIAL VITALS:  weight is 140 lb (63.5 kg). Her oral temperature is 98.3 °F (36.8 °C). Her blood pressure is 114/74 and her pulse is 78. Her respiration is 19 and oxygen saturation is 100%. Physical Exam   Constitutional: She is oriented to person, place, and time. She appears well-developed and well-nourished. No distress. HENT:   Head: Normocephalic and atraumatic. Right Ear: Tympanic membrane and external ear normal.   Left Ear: Tympanic membrane and external ear normal.   Nose: Nose normal.   Mouth/Throat: Uvula is midline, oropharynx is clear and moist and mucous membranes are normal. No oropharyngeal exudate, posterior oropharyngeal edema or posterior oropharyngeal erythema. Eyes: Pupils are equal, round, and reactive to light. Conjunctivae and EOM are normal. Right eye exhibits no discharge. Left eye exhibits no discharge. No scleral icterus. Neck: Trachea normal, normal range of motion, full passive range of motion without pain and phonation normal. Neck supple. No JVD present.  No tracheal tenderness, no spinous process tenderness and no muscular tenderness present. Carotid bruit is not present. No neck rigidity. No tracheal deviation present. Cardiovascular: Normal rate, regular rhythm, S1 normal, S2 normal and normal pulses. Exam reveals no gallop and no friction rub. No murmur heard. Pulses:       Radial pulses are 2+ on the right side, and 2+ on the left side. Dorsalis pedis pulses are 2+ on the right side, and 2+ on the left side. Posterior tibial pulses are 2+ on the right side, and 2+ on the left side. Pulmonary/Chest: Effort normal and breath sounds normal. No stridor. No respiratory distress. She has no decreased breath sounds. She has no wheezes. She has no rhonchi. She has no rales. She exhibits no tenderness. Abdominal: Soft. Bowel sounds are normal. She exhibits no distension. There is tenderness (very mild between suprapubic and right lower quadrant). There is no rigidity, no rebound, no guarding and no CVA tenderness. Genitourinary: There is no rash, tenderness or lesion on the right labia. There is no rash, tenderness or lesion on the left labia. Cervix exhibits no motion tenderness, no discharge and no friability. No erythema, tenderness or bleeding in the vagina. No foreign body in the vagina. No signs of injury around the vagina. Vaginal discharge found. Genitourinary Comments: White thin discharge in vaginal vault. Mildly foul smelling. Suspicious for BV. No vaginal bleeding noted. Os is closed. Musculoskeletal: Normal range of motion. She exhibits no edema or tenderness. Lymphadenopathy:     She has no cervical adenopathy. No inguinal adenopathy noted on the right or left side. Neurological: She is alert and oriented to person, place, and time. She has normal strength. No cranial nerve deficit or sensory deficit. She exhibits normal muscle tone. Coordination normal.   Skin: Skin is warm and dry. She is not diaphoretic.    Nursing note and components:    MCHC 30.3 (*)     All other components within normal limits   COMPREHENSIVE METABOLIC PANEL - Abnormal; Notable for the following components:    CO2 18 (*)     Total Bilirubin 0.2 (*)     All other components within normal limits   HCG, QUANTITATIVE, PREGNANCY - Abnormal; Notable for the following components:    hCG,Beta Subunit,Qual,Serum 700387.7 (*)     All other components within normal limits   OSMOLALITY - Abnormal; Notable for the following components:    Osmolality Calc 272.9 (*)     All other components within normal limits   KOH (SKIN,HAIR,NAILS)    Narrative:     Source: vaginal OB patient       Site:           Current Antibiotics: not stated   WET PREP, GENITAL    Narrative:     Source: vaginal OB patient       Site:           Current Antibiotics: not stated   C. TRACHOMATIS / N. GONORRHOEAE, DNA   URINE DRUG SCREEN   ANION GAP   GLOMERULAR FILTRATION RATE, ESTIMATED   URINE WITH REFLEXED MICRO   ABO/RH       EMERGENCY DEPARTMENT COURSE:   Vitals:    Vitals:    04/20/19 1232 04/20/19 1415   BP: 119/64 114/74   Pulse: 76 78   Resp: 20 19   Temp: 98.3 °F (36.8 °C)    TempSrc: Oral    SpO2: 100% 100%   Weight: 140 lb (63.5 kg)        12:37 PM: The patient was seen and evaluated. MDM:  Pt seen in ED for evaluation of lower abdominal cramping and spotting today. reports 7 wks preg. VS reviewed and stable. Afebrile. very mild suprapubic/RLQ ttp on exam. Blood type O+. wbc wnl. UA negative. TVUS shows 2 gestational sacs containing 2 live intrauterine gestations.  and 157. pelvic exam suspect BV. also shows few budding yeast on wet prep. Pt was treated in ED with tylenol and on reevaluation is not having abdominal pain/tenderness currently. Has had no active n/v in ED and wbc wnl. Have very low clinical suspicion for appendicitis. Consulted and discussed with Dr. Emily Grant, [facility] covering for Dr. Sylvester Gomes.  Agrees with plan of treating for yeast and BV, will send home with Miconazole

## 2019-04-21 NOTE — PROGRESS NOTES
Pharmacy Note  ED Culture Follow-up    Xiomy Ignacio is a 33 y.o. female. Allergies: Patient has no known allergies. Labs:  Lab Results   Component Value Date    BUN 13 04/20/2019    CREATININE 0.6 04/20/2019    WBC 6.7 04/20/2019     CrCl cannot be calculated (Unknown ideal weight.). Current antimicrobials:   Miconazole cream    ASSESSMENT:  Micro results:   KOH prep: budding yeast      PLAN:  Need for intervention: No   Discussed with: SACHIN Lee  Chosen treatment:    Patient already on appropriate treatment as above    Patient response:   No need to contact patient    Called/sent in prescription to: Not applicable    Please call with any questions.  [id number], Debi 4/21/2019  3:14 PM

## 2019-04-24 LAB
GENITAL CULTURE, ROUTINE: ABNORMAL
GENITAL CULTURE, ROUTINE: ABNORMAL
GRAM STAIN RESULT: ABNORMAL
ORGANISM: ABNORMAL

## 2019-04-26 ASSESSMENT — ENCOUNTER SYMPTOMS
SINUS PRESSURE: 0
BLOOD IN STOOL: 0
CONSTIPATION: 0
NAUSEA: 1
STRIDOR: 0
ANAL BLEEDING: 0
RECTAL PAIN: 0
TROUBLE SWALLOWING: 0
SINUS PAIN: 0
ABDOMINAL DISTENTION: 0
VOICE CHANGE: 0

## 2019-06-13 ENCOUNTER — APPOINTMENT (OUTPATIENT)
Dept: ULTRASOUND IMAGING | Age: 29
End: 2019-06-13
Payer: COMMERCIAL

## 2019-06-13 ENCOUNTER — HOSPITAL ENCOUNTER (EMERGENCY)
Age: 29
Discharge: HOME | End: 2019-06-13
Attending: FAMILY MEDICINE
Payer: COMMERCIAL

## 2019-06-13 VITALS
DIASTOLIC BLOOD PRESSURE: 81 MMHG | WEIGHT: 139 LBS | OXYGEN SATURATION: 100 % | RESPIRATION RATE: 20 BRPM | BODY MASS INDEX: 22.34 KG/M2 | SYSTOLIC BLOOD PRESSURE: 96 MMHG | HEART RATE: 97 BPM | HEIGHT: 66 IN | TEMPERATURE: 98.1 F

## 2019-06-13 DIAGNOSIS — N30.01 ACUTE CYSTITIS WITH HEMATURIA: ICD-10-CM

## 2019-06-13 DIAGNOSIS — R11.0 NAUSEA: ICD-10-CM

## 2019-06-13 DIAGNOSIS — R10.9 FLANK PAIN: ICD-10-CM

## 2019-06-13 DIAGNOSIS — N20.0 KIDNEY STONE: Primary | ICD-10-CM

## 2019-06-13 DIAGNOSIS — O30.001 TWIN GESTATION IN FIRST TRIMESTER, UNSPECIFIED MULTIPLE GESTATION TYPE: ICD-10-CM

## 2019-06-13 LAB
ABO: NORMAL
ALBUMIN SERPL-MCNC: 3.5 G/DL (ref 3.5–5.1)
ALP BLD-CCNC: 59 U/L (ref 38–126)
ALT SERPL-CCNC: 35 U/L (ref 11–66)
ANION GAP SERPL CALCULATED.3IONS-SCNC: 12 MEQ/L (ref 8–16)
AST SERPL-CCNC: 26 U/L (ref 5–40)
BACTERIA: ABNORMAL /HPF
BASOPHILS # BLD: 0.3 %
BASOPHILS ABSOLUTE: 0 THOU/MM3 (ref 0–0.1)
BILIRUB SERPL-MCNC: < 0.2 MG/DL (ref 0.3–1.2)
BILIRUBIN DIRECT: < 0.2 MG/DL (ref 0–0.3)
BILIRUBIN URINE: NEGATIVE
BLOOD, URINE: ABNORMAL
BUN BLDV-MCNC: 7 MG/DL (ref 7–22)
CALCIUM SERPL-MCNC: 9 MG/DL (ref 8.5–10.5)
CASTS 2: ABNORMAL /LPF
CASTS UA: ABNORMAL /LPF
CHARACTER, URINE: ABNORMAL
CHLORIDE BLD-SCNC: 102 MEQ/L (ref 98–111)
CO2: 19 MEQ/L (ref 23–33)
COLOR: YELLOW
CREAT SERPL-MCNC: 0.5 MG/DL (ref 0.4–1.2)
CRYSTALS, UA: ABNORMAL
EOSINOPHIL # BLD: 2.1 %
EOSINOPHILS ABSOLUTE: 0.1 THOU/MM3 (ref 0–0.4)
EPITHELIAL CELLS, UA: ABNORMAL /HPF
ERYTHROCYTE [DISTWIDTH] IN BLOOD BY AUTOMATED COUNT: 13.2 % (ref 11.5–14.5)
ERYTHROCYTE [DISTWIDTH] IN BLOOD BY AUTOMATED COUNT: 40.6 FL (ref 35–45)
GFR SERPL CREATININE-BSD FRML MDRD: > 90 ML/MIN/1.73M2
GLUCOSE BLD-MCNC: 71 MG/DL (ref 70–108)
GLUCOSE URINE: NEGATIVE MG/DL
HCG,BETA SUBUNIT,QUAL,SERUM: ABNORMAL MIU/ML (ref 0–5)
HCT VFR BLD CALC: 36.1 % (ref 37–47)
HEMOGLOBIN: 11.5 GM/DL (ref 12–16)
IMMATURE GRANS (ABS): 0.02 THOU/MM3 (ref 0–0.07)
IMMATURE GRANULOCYTES: 0.3 %
KETONES, URINE: 40
LEUKOCYTE ESTERASE, URINE: ABNORMAL
LIPASE: 47.5 U/L (ref 5.6–51.3)
LYMPHOCYTES # BLD: 29.5 %
LYMPHOCYTES ABSOLUTE: 2.1 THOU/MM3 (ref 1–4.8)
MCH RBC QN AUTO: 26.9 PG (ref 26–33)
MCHC RBC AUTO-ENTMCNC: 31.9 GM/DL (ref 32.2–35.5)
MCV RBC AUTO: 84.3 FL (ref 81–99)
MISCELLANEOUS 2: ABNORMAL
MONOCYTES # BLD: 6.7 %
MONOCYTES ABSOLUTE: 0.5 THOU/MM3 (ref 0.4–1.3)
NITRITE, URINE: NEGATIVE
NUCLEATED RED BLOOD CELLS: 0 /100 WBC
OSMOLALITY CALCULATION: 262.8 MOSMOL/KG (ref 275–300)
PH UA: 6.5 (ref 5–9)
PLATELET # BLD: 256 THOU/MM3 (ref 130–400)
PMV BLD AUTO: 10.1 FL (ref 9.4–12.4)
POTASSIUM SERPL-SCNC: 4 MEQ/L (ref 3.5–5.2)
PROTEIN UA: 30
RBC # BLD: 4.28 MILL/MM3 (ref 4.2–5.4)
RBC URINE: ABNORMAL /HPF
RENAL EPITHELIAL, UA: ABNORMAL
RH FACTOR: NORMAL
SEG NEUTROPHILS: 61.1 %
SEGMENTED NEUTROPHILS ABSOLUTE COUNT: 4.3 THOU/MM3 (ref 1.8–7.7)
SODIUM BLD-SCNC: 133 MEQ/L (ref 135–145)
SPECIFIC GRAVITY, URINE: 1.02 (ref 1–1.03)
TOTAL PROTEIN: 6.9 G/DL (ref 6.1–8)
UROBILINOGEN, URINE: 0.2 EU/DL (ref 0–1)
WBC # BLD: 7 THOU/MM3 (ref 4.8–10.8)
WBC UA: ABNORMAL /HPF
YEAST: ABNORMAL

## 2019-06-13 PROCEDURE — 86900 BLOOD TYPING SEROLOGIC ABO: CPT

## 2019-06-13 PROCEDURE — 6360000002 HC RX W HCPCS: Performed by: FAMILY MEDICINE

## 2019-06-13 PROCEDURE — 81001 URINALYSIS AUTO W/SCOPE: CPT

## 2019-06-13 PROCEDURE — 96374 THER/PROPH/DIAG INJ IV PUSH: CPT

## 2019-06-13 PROCEDURE — 84702 CHORIONIC GONADOTROPIN TEST: CPT

## 2019-06-13 PROCEDURE — 83690 ASSAY OF LIPASE: CPT

## 2019-06-13 PROCEDURE — 86901 BLOOD TYPING SEROLOGIC RH(D): CPT

## 2019-06-13 PROCEDURE — 85025 COMPLETE CBC W/AUTO DIFF WBC: CPT

## 2019-06-13 PROCEDURE — 80053 COMPREHEN METABOLIC PANEL: CPT

## 2019-06-13 PROCEDURE — 99284 EMERGENCY DEPT VISIT MOD MDM: CPT

## 2019-06-13 PROCEDURE — 76815 OB US LIMITED FETUS(S): CPT

## 2019-06-13 PROCEDURE — 2580000003 HC RX 258: Performed by: FAMILY MEDICINE

## 2019-06-13 PROCEDURE — 76770 US EXAM ABDO BACK WALL COMP: CPT

## 2019-06-13 PROCEDURE — 82248 BILIRUBIN DIRECT: CPT

## 2019-06-13 PROCEDURE — 6370000000 HC RX 637 (ALT 250 FOR IP): Performed by: FAMILY MEDICINE

## 2019-06-13 PROCEDURE — 36415 COLL VENOUS BLD VENIPUNCTURE: CPT

## 2019-06-13 RX ORDER — 0.9 % SODIUM CHLORIDE 0.9 %
1000 INTRAVENOUS SOLUTION INTRAVENOUS ONCE
Status: COMPLETED | OUTPATIENT
Start: 2019-06-13 | End: 2019-06-13

## 2019-06-13 RX ORDER — ONDANSETRON 4 MG/1
4 TABLET, ORALLY DISINTEGRATING ORAL 3 TIMES DAILY PRN
Qty: 21 TABLET | Refills: 0 | Status: SHIPPED | OUTPATIENT
Start: 2019-06-13 | End: 2020-06-25

## 2019-06-13 RX ORDER — CEPHALEXIN 500 MG/1
500 CAPSULE ORAL 2 TIMES DAILY
Qty: 14 CAPSULE | Refills: 0 | Status: SHIPPED | OUTPATIENT
Start: 2019-06-13 | End: 2019-06-20

## 2019-06-13 RX ORDER — ONDANSETRON 2 MG/ML
4 INJECTION INTRAMUSCULAR; INTRAVENOUS ONCE
Status: COMPLETED | OUTPATIENT
Start: 2019-06-13 | End: 2019-06-13

## 2019-06-13 RX ORDER — ACETAMINOPHEN 325 MG/1
650 TABLET ORAL ONCE
Status: COMPLETED | OUTPATIENT
Start: 2019-06-13 | End: 2019-06-13

## 2019-06-13 RX ADMIN — ACETAMINOPHEN 650 MG: 325 TABLET ORAL at 14:08

## 2019-06-13 RX ADMIN — ONDANSETRON 4 MG: 2 INJECTION INTRAMUSCULAR; INTRAVENOUS at 14:46

## 2019-06-13 RX ADMIN — SODIUM CHLORIDE 1000 ML: 9 INJECTION, SOLUTION INTRAVENOUS at 14:46

## 2019-06-13 ASSESSMENT — PAIN DESCRIPTION - PAIN TYPE: TYPE: ACUTE PAIN

## 2019-06-13 ASSESSMENT — ENCOUNTER SYMPTOMS
WHEEZING: 0
DIARRHEA: 0
BACK PAIN: 0
NAUSEA: 1
CONSTIPATION: 0
RHINORRHEA: 0
ABDOMINAL PAIN: 0
COUGH: 0
SHORTNESS OF BREATH: 0
EYE DISCHARGE: 0
VOMITING: 1
SORE THROAT: 0
EYE PAIN: 0

## 2019-06-13 ASSESSMENT — PAIN SCALES - GENERAL
PAINLEVEL_OUTOF10: 10
PAINLEVEL_OUTOF10: 9

## 2019-06-13 ASSESSMENT — PAIN DESCRIPTION - LOCATION: LOCATION: FLANK

## 2019-06-13 ASSESSMENT — PAIN DESCRIPTION - ORIENTATION: ORIENTATION: LEFT

## 2019-06-13 NOTE — ED NOTES
Patient transported to Radiology department via [place] tech in stable condition.        Rey Spence RN  06/13/19 [id number]

## 2019-06-13 NOTE — LETTER
[facility] Emergency Department   [place], [address]          PROOF OF PRESENCE      To Whom It May Concern:    Adan Bethea was present in the Emergency Department at [facility] Emergency Department on 6/13/19.                                      Sincerely,      Dr. Mathew Donald, RN

## 2019-06-13 NOTE — ED PROVIDER NOTES
[facility] [address] COMPLAINT       Chief Complaint   Patient presents with    Flank Pain    Nausea       Nurses Notes reviewed and I agree except as noted in the HPI. HISTORY OF PRESENT ILLNESS    Yana Spears is a 33 y.o. female who presents to the Emergency Department from home for the evaluation of left flank pain onset 12pm today at work. 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 She denies heavy lifting, pushing, pulling, twisting or turning. Patient denies radiation of pain. She rates her current pain as a 10/10 in severity. Patient reports associated nausea and vomiting. She denies dysuria, frequency, urgency, hematuria, vaginal pain, vaginal bleeding, vaginal discharge, or contractions. Patient is currently 15 weeks and 3 days pregnant with twins. She is A0. Patient's Ob/Gyn is Dr. Katherine Smith. No further complaints at the time of the initial encounter. The HPI was provided by the patient. REVIEW OF SYSTEMS     Review of Systems   Constitutional: Negative for appetite change, chills, fatigue and fever. HENT: Negative for congestion, ear pain, rhinorrhea and sore throat. Eyes: Negative for pain, discharge and visual disturbance. Respiratory: Negative for cough, shortness of breath and wheezing. Cardiovascular: Negative for chest pain, palpitations and leg swelling. Gastrointestinal: Positive for nausea and vomiting. Negative for abdominal pain, constipation and diarrhea. Genitourinary: Positive for flank pain (left). Negative for decreased urine volume, difficulty urinating, dysuria, frequency, hematuria, pelvic pain, urgency, vaginal bleeding, vaginal discharge and vaginal pain. Musculoskeletal: Negative for arthralgias, back pain, joint swelling and neck pain. Skin: Negative for pallor and rash.    Neurological: Negative for dizziness, syncope, weakness, light-headedness, numbness and headaches. Hematological: Negative for adenopathy. Psychiatric/Behavioral: Negative for confusion and suicidal ideas. The patient is not nervous/anxious. PAST MEDICAL HISTORY    has a past medical history of Anemia, Asthma, Constipation, Headache(784.0), Interstitial cystitis, and Sickle cell anemia ([address] SURGICAL HISTORY      has a past surgical history that includes Ankle surgery; Colonoscopy (2013); laparoscopy (7/1/13); [place] tooth extraction (2014); Cystocopy (9/28/15); Foot surgery (Bilateral, 06/22/2016); Dilation and curettage of uterus; Foot surgery (Left, 09/27/2017); pr gastrocnemius recession (Left, 9/27/2017); other surgical history (Left, 02/14/2018); pr removal deep implant (Left, 2/14/2018); Foot surgery (Right, 03/28/2018); and pr full excis 5th metatarsal head (Right, 3/28/2018). CURRENT MEDICATIONS       Discharge Medication List as of 6/13/2019  6:51 PM      CONTINUE these medications which have NOT CHANGED    Details   loratadine (CLARITIN) 10 MG tablet Take 1 tablet by mouth daily, Disp-30 tablet, R-0Normal      ADVAIR -21 MCG/ACT inhaler Inhale 1 puff into the lungs 2 times daily. Historical Med      albuterol (PROVENTIL HFA) 108 (90 BASE) MCG/ACT inhaler Inhale 2 puffs into the lungs every 4 hours as needed for Wheezing or Shortness of Breath (Space out to every 6 hours as symptoms improve). Space out to every 6 hours as symptoms improve., Disp-1 Inhaler, R-0Print      PRENATAL VIT-DSS-FE FUM-FA PO Take  by mouth. Historical Med      acetaminophen (TYLENOL) 325 MG tablet Take 650 mg by mouth every 6 hours as needed for PainHistorical Med      norethindrone (NORLYDA) 0.35 MG tablet Take 1 tablet by mouth dailyHistorical Med             ALLERGIES     has No Known Allergies. FAMILY HISTORY     indicated that her mother is alive. She indicated that her father is alive. She indicated that the status of her maternal grandmother is unknown.  She indicated that her maternal grandfather is . family history includes Asthma in her father and mother; Depression in her mother; Heart Disease in her maternal grandmother; High Blood Pressure in her maternal grandmother. SOCIAL HISTORY      reports that she has never smoked. She has never used smokeless tobacco. She reports that she does not drink alcohol or use drugs. PHYSICAL EXAM     INITIAL VITALS:  height is 5' 6\" (1.676 m) and weight is 139 lb (63 kg). Her oral temperature is 98.1 °F (36.7 °C). Her blood pressure is 96/81 and her pulse is 97. Her respiration is 20 and oxygen saturation is 100%. Physical Exam   Constitutional: She is oriented to person, place, and time. She appears well-developed and well-nourished. No distress. HENT:   Head: Normocephalic and atraumatic. Right Ear: External ear normal.   Left Ear: External ear normal.   Eyes: Conjunctivae are normal.   Neck: Normal range of motion. Neck supple. No thyromegaly present. Cardiovascular: Normal rate, regular rhythm and normal heart sounds. No murmur heard. Pulmonary/Chest: Effort normal and breath sounds normal. No respiratory distress. She has no decreased breath sounds. She has no wheezes. She has no rhonchi. She has no rales. She exhibits no tenderness. Abdominal: Soft. She exhibits no distension. There is no tenderness. There is CVA tenderness (left). Musculoskeletal: Normal range of motion. She exhibits no edema. Neurological: She is alert and oriented to person, place, and time. No cranial nerve deficit. Skin: Skin is warm and dry. No rash noted. She is not diaphoretic. No erythema. No pallor. Psychiatric: She has a normal mood and affect. Her behavior is normal. Judgment and thought content normal.   Nursing note and vitals reviewed.       DIFFERENTIALDIAGNOSIS:   Differential Dx Lists - I consider the following to be a partial list of the possible etiologies for the patient's symptoms and based on my clinical findings as well and are part of my medical decision making:    Pyelonephritis, UTI, renal calculus, musculoskeletal pain, STD, round ligament pain, miscarriage, ectopic     DIAGNOSTIC RESULTS     EKG: All EKG's are interpreted by the Emergency Department Physician who either signs or Co-signs this chart in the absence of a cardiologist.  EKG interpreted by Noemi Ernandez MD:    None    RADIOLOGY: non-plain film images(s) such as CT, Ultrasound and MRI are read by the radiologist.    US RENAL COMPLETE   Final Result       1. Mild left renal pelvic distention of unknown etiology no visualized filling defects. Previously passed through a distal ureteral process is not excluded. 2. Echogenic focus of the right mid pole kidney may reflect a small angiomyolipoma or nonobstructing calculus. **This report has been created using voice recognition software. It may contain minor errors which are inherent in voice recognition technology. **      Final report electronically signed by Dr. Hal Celis on 6/13/2019 5:57 PM      US OB 1 OR MORE FETUS LIMITED   Final Result   1.   intrauterine living twin gestation at approximately 15 weeks 6 days with an estimated date of delivery by ultrasound criteria of November 29, 2019. There is a single live intrauterine gestation. The uterus is normal. No uterine masses are noted. There is no pelvic free fluid. The right ovary is of normal size. There are normal small follicles. There is normal color flow. There is no adnexal mass. The left ovary is of normal size. There are normal small follicles. There is normal color flow. There is no adnexal mass. IMPRESSION:                  **This report has been created using voice recognition software. It may contain minor errors which are inherent in voice recognition technology. **         Final report electronically signed by Dr. Hal Celis on 6/13/2019 5:37 PM          LABS:   Labs Reviewed CBC WITH AUTO DIFFERENTIAL - Abnormal; Notable for the following components:       Result Value    Hemoglobin 11.5 (*)     Hematocrit 36.1 (*)     MCHC 31.9 (*)     All other components within normal limits   BASIC METABOLIC PANEL - Abnormal; Notable for the following components:    Sodium 133 (*)     CO2 19 (*)     All other components within normal limits   HEPATIC FUNCTION PANEL - Abnormal; Notable for the following components: Total Bilirubin <0.2 (*)     All other components within normal limits   HCG, QUANTITATIVE, PREGNANCY - Abnormal; Notable for the following components:    hCG,Beta Subunit,Qual,Serum 88872.0 (*)     All other components within normal limits   OSMOLALITY - Abnormal; Notable for the following components:    Osmolality Calc 262.8 (*)     All other components within normal limits   URINE WITH REFLEXED MICRO - Abnormal; Notable for the following components:    Ketones, Urine 40 (*)     Blood, Urine LARGE (*)     Protein, UA 30 (*)     Leukocyte Esterase, Urine SMALL (*)     Character, Urine CLOUDY (*)     All other components within normal limits   LIPASE   ANION GAP   GLOMERULAR FILTRATION RATE, ESTIMATED   ABO/RH       EMERGENCYDEPARTMENT COURSE:   Vitals:    Vitals:    06/13/19 1343 06/13/19 1447 06/13/19 1641 06/13/19 1743   BP: 115/69 108/67 115/81 96/81   Pulse: 115 75 84 97   Resp: 20 20 20 20   Temp: 98.1 °F (36.7 °C)      TempSrc: Oral      SpO2: 100% 100% 96% 100%   Weight: 139 lb (63 kg)      Height: 5' 6\" (1.676 m)          2:06 PM: The patient was seen and evaluated. MDM:  The patient was seen and evaluated by me at bedside for flank pain. The patient is currently 15 weeks pregnant with twins. The patient arrived in no acute distress and in stable condition. Within the department, I observed the patient's vital signs. On exam, I appreciated left CVA tenderness. Appropriate labs and imaging studies were ordered and reviewed. US revealed mild left renal pelvic distention.  [address] documentation which was dictated to the scribe in my presence, and it accurately records my words and actions.     Vero Whipple MD 6/13/19 11:12 AM        Vero Whipple MD  06/14/19 [id number]

## 2019-06-13 NOTE — ED NOTES
Pt resting on cot at this time on phone. States she is no longer experiencing nausea or pain. Denies other needs at this time.      Chela Cheema RN  06/13/19 0163

## 2019-10-13 ENCOUNTER — HOSPITAL ENCOUNTER (OUTPATIENT)
Age: 29
Discharge: HOME | End: 2019-10-14
Attending: OBSTETRICS & GYNECOLOGY | Admitting: OBSTETRICS & GYNECOLOGY
Payer: COMMERCIAL

## 2019-10-13 PROCEDURE — 81001 URINALYSIS AUTO W/SCOPE: CPT

## 2019-10-13 PROCEDURE — 2709999900 HC NON-CHARGEABLE SUPPLY

## 2019-10-13 PROCEDURE — 82731 ASSAY OF FETAL FIBRONECTIN: CPT

## 2019-10-13 RX ORDER — BETAMETHASONE SODIUM PHOSPHATE AND BETAMETHASONE ACETATE 3; 3 MG/ML; MG/ML
12 INJECTION, SUSPENSION INTRA-ARTICULAR; INTRALESIONAL; INTRAMUSCULAR; SOFT TISSUE EVERY 24 HOURS
Status: COMPLETED | OUTPATIENT
Start: 2019-10-14 | End: 2019-10-14

## 2019-10-13 RX ORDER — ACETAMINOPHEN 500 MG
1000 TABLET ORAL EVERY 6 HOURS PRN
Status: DISCONTINUED | OUTPATIENT
Start: 2019-10-13 | End: 2019-10-15 | Stop reason: HOSPADM

## 2019-10-13 RX ORDER — SODIUM CHLORIDE, SODIUM LACTATE, POTASSIUM CHLORIDE, CALCIUM CHLORIDE 600; 310; 30; 20 MG/100ML; MG/100ML; MG/100ML; MG/100ML
INJECTION, SOLUTION INTRAVENOUS CONTINUOUS
Status: DISCONTINUED | OUTPATIENT
Start: 2019-10-14 | End: 2019-10-15 | Stop reason: HOSPADM

## 2019-10-13 RX ORDER — TERBUTALINE SULFATE 1 MG/ML
0.25 INJECTION, SOLUTION SUBCUTANEOUS ONCE
Status: COMPLETED | OUTPATIENT
Start: 2019-10-14 | End: 2019-10-14

## 2019-10-14 VITALS
BODY MASS INDEX: 25.55 KG/M2 | HEART RATE: 104 BPM | WEIGHT: 159 LBS | TEMPERATURE: 98.1 F | HEIGHT: 66 IN | OXYGEN SATURATION: 100 % | SYSTOLIC BLOOD PRESSURE: 119 MMHG | RESPIRATION RATE: 18 BRPM | DIASTOLIC BLOOD PRESSURE: 66 MMHG

## 2019-10-14 LAB
BACTERIA: ABNORMAL /HPF
BILIRUBIN URINE: NEGATIVE
BLOOD, URINE: ABNORMAL
CASTS 2: ABNORMAL /LPF
CASTS UA: ABNORMAL /LPF
CHARACTER, URINE: CLEAR
COLOR: YELLOW
CRYSTALS, UA: ABNORMAL
EPITHELIAL CELLS, UA: ABNORMAL /HPF
FETAL FIBRONECTIN: NEGATIVE
GLUCOSE URINE: NEGATIVE MG/DL
KETONES, URINE: NEGATIVE
LEUKOCYTE ESTERASE, URINE: NEGATIVE
MISCELLANEOUS 2: ABNORMAL
NITRITE, URINE: NEGATIVE
PH UA: 7 (ref 5–9)
PROTEIN UA: NEGATIVE
RBC URINE: ABNORMAL /HPF
RENAL EPITHELIAL, UA: ABNORMAL
SPECIFIC GRAVITY, URINE: 1.01 (ref 1–1.03)
UROBILINOGEN, URINE: 0.2 EU/DL (ref 0–1)
WBC UA: ABNORMAL /HPF
YEAST: ABNORMAL

## 2019-10-14 PROCEDURE — 6370000000 HC RX 637 (ALT 250 FOR IP): Performed by: OBSTETRICS & GYNECOLOGY

## 2019-10-14 PROCEDURE — 87653 STREP B DNA AMP PROBE: CPT

## 2019-10-14 PROCEDURE — 87081 CULTURE SCREEN ONLY: CPT

## 2019-10-14 PROCEDURE — 2709999900 HC NON-CHARGEABLE SUPPLY

## 2019-10-14 PROCEDURE — 96372 THER/PROPH/DIAG INJ SC/IM: CPT

## 2019-10-14 PROCEDURE — 6360000002 HC RX W HCPCS: Performed by: OBSTETRICS & GYNECOLOGY

## 2019-10-14 PROCEDURE — 2580000003 HC RX 258: Performed by: OBSTETRICS & GYNECOLOGY

## 2019-10-14 RX ORDER — BETAMETHASONE SODIUM PHOSPHATE AND BETAMETHASONE ACETATE 3; 3 MG/ML; MG/ML
INJECTION, SUSPENSION INTRA-ARTICULAR; INTRALESIONAL; INTRAMUSCULAR; SOFT TISSUE
Status: DISPENSED
Start: 2019-10-14 | End: 2019-10-14

## 2019-10-14 RX ADMIN — TERBUTALINE SULFATE 0.25 MG: 1 INJECTION SUBCUTANEOUS at 00:16

## 2019-10-14 RX ADMIN — SODIUM CHLORIDE, POTASSIUM CHLORIDE, SODIUM LACTATE AND CALCIUM CHLORIDE: 600; 310; 30; 20 INJECTION, SOLUTION INTRAVENOUS at 00:15

## 2019-10-14 RX ADMIN — BETAMETHASONE SODIUM PHOSPHATE AND BETAMETHASONE ACETATE 12 MG: 3; 3 INJECTION, SUSPENSION INTRA-ARTICULAR; INTRALESIONAL; INTRAMUSCULAR at 00:19

## 2019-10-14 RX ADMIN — SODIUM CHLORIDE, POTASSIUM CHLORIDE, SODIUM LACTATE AND CALCIUM CHLORIDE: 600; 310; 30; 20 INJECTION, SOLUTION INTRAVENOUS at 09:32

## 2019-10-14 RX ADMIN — ACETAMINOPHEN 1000 MG: 500 TABLET ORAL at 17:29

## 2019-10-14 RX ADMIN — SODIUM CHLORIDE, POTASSIUM CHLORIDE, SODIUM LACTATE AND CALCIUM CHLORIDE: 600; 310; 30; 20 INJECTION, SOLUTION INTRAVENOUS at 01:15

## 2019-10-14 RX ADMIN — BETAMETHASONE SODIUM PHOSPHATE AND BETAMETHASONE ACETATE 12 MG: 3; 3 INJECTION, SUSPENSION INTRA-ARTICULAR; INTRALESIONAL; INTRAMUSCULAR at 21:58

## 2019-10-14 ASSESSMENT — PAIN DESCRIPTION - DESCRIPTORS
DESCRIPTORS: CRAMPING

## 2019-10-14 ASSESSMENT — PAIN SCALES - GENERAL: PAINLEVEL_OUTOF10: 7

## 2019-10-15 LAB — GROUP B STREP CULTURE: NORMAL

## 2019-11-18 RX ORDER — MISOPROSTOL 200 UG/1
1000 TABLET ORAL PRN
Status: DISCONTINUED | OUTPATIENT
Start: 2019-11-18 | End: 2019-11-25 | Stop reason: HOSPADM

## 2019-11-18 RX ORDER — TERBUTALINE SULFATE 1 MG/ML
0.25 INJECTION, SOLUTION SUBCUTANEOUS
Status: ACTIVE | OUTPATIENT
Start: 2019-11-18 | End: 2019-11-18

## 2019-11-18 RX ORDER — MORPHINE SULFATE 4 MG/ML
4 INJECTION, SOLUTION INTRAMUSCULAR; INTRAVENOUS
Status: DISCONTINUED | OUTPATIENT
Start: 2019-11-18 | End: 2019-11-25 | Stop reason: HOSPADM

## 2019-11-18 RX ORDER — ACETAMINOPHEN 325 MG/1
650 TABLET ORAL EVERY 4 HOURS PRN
Status: DISCONTINUED | OUTPATIENT
Start: 2019-11-18 | End: 2019-11-25 | Stop reason: HOSPADM

## 2019-11-18 RX ORDER — SEVOFLURANE 250 ML/250ML
1 LIQUID RESPIRATORY (INHALATION) CONTINUOUS PRN
Status: DISCONTINUED | OUTPATIENT
Start: 2019-11-18 | End: 2019-11-25 | Stop reason: HOSPADM

## 2019-11-18 RX ORDER — SODIUM CHLORIDE, SODIUM LACTATE, POTASSIUM CHLORIDE, CALCIUM CHLORIDE 600; 310; 30; 20 MG/100ML; MG/100ML; MG/100ML; MG/100ML
INJECTION, SOLUTION INTRAVENOUS CONTINUOUS
Status: DISCONTINUED | OUTPATIENT
Start: 2019-11-18 | End: 2019-11-25 | Stop reason: HOSPADM

## 2019-11-18 RX ORDER — IBUPROFEN 800 MG/1
800 TABLET ORAL EVERY 8 HOURS PRN
Status: DISCONTINUED | OUTPATIENT
Start: 2019-11-18 | End: 2019-11-19 | Stop reason: SDUPTHER

## 2019-11-18 RX ORDER — METHYLERGONOVINE MALEATE 0.2 MG/ML
200 INJECTION INTRAVENOUS PRN
Status: DISCONTINUED | OUTPATIENT
Start: 2019-11-18 | End: 2019-11-25 | Stop reason: HOSPADM

## 2019-11-18 RX ORDER — ONDANSETRON 2 MG/ML
8 INJECTION INTRAMUSCULAR; INTRAVENOUS EVERY 6 HOURS PRN
Status: DISCONTINUED | OUTPATIENT
Start: 2019-11-18 | End: 2019-11-25 | Stop reason: HOSPADM

## 2019-11-18 RX ORDER — MORPHINE SULFATE 2 MG/ML
2 INJECTION, SOLUTION INTRAMUSCULAR; INTRAVENOUS
Status: DISCONTINUED | OUTPATIENT
Start: 2019-11-18 | End: 2019-11-25 | Stop reason: HOSPADM

## 2019-11-18 RX ORDER — LIDOCAINE HYDROCHLORIDE 10 MG/ML
30 INJECTION, SOLUTION EPIDURAL; INFILTRATION; INTRACAUDAL; PERINEURAL PRN
Status: ACTIVE | OUTPATIENT
Start: 2019-11-18 | End: 2019-11-20

## 2019-11-18 RX ORDER — CARBOPROST TROMETHAMINE 250 UG/ML
250 INJECTION, SOLUTION INTRAMUSCULAR PRN
Status: DISCONTINUED | OUTPATIENT
Start: 2019-11-18 | End: 2019-11-25 | Stop reason: HOSPADM

## 2019-11-18 RX ORDER — SODIUM CHLORIDE 0.9 % (FLUSH) 0.9 %
10 SYRINGE (ML) INJECTION EVERY 12 HOURS SCHEDULED
Status: DISCONTINUED | OUTPATIENT
Start: 2019-11-18 | End: 2019-11-25 | Stop reason: HOSPADM

## 2019-11-18 RX ORDER — DIPHENHYDRAMINE HYDROCHLORIDE 50 MG/ML
25 INJECTION INTRAMUSCULAR; INTRAVENOUS EVERY 4 HOURS PRN
Status: DISCONTINUED | OUTPATIENT
Start: 2019-11-18 | End: 2019-11-25 | Stop reason: HOSPADM

## 2019-11-18 RX ORDER — SODIUM CHLORIDE 0.9 % (FLUSH) 0.9 %
10 SYRINGE (ML) INJECTION PRN
Status: DISCONTINUED | OUTPATIENT
Start: 2019-11-18 | End: 2019-11-25 | Stop reason: HOSPADM

## 2019-11-18 RX ORDER — BUTORPHANOL TARTRATE 1 MG/ML
1 INJECTION, SOLUTION INTRAMUSCULAR; INTRAVENOUS
Status: ACTIVE | OUTPATIENT
Start: 2019-11-18 | End: 2019-11-20

## 2019-11-19 ENCOUNTER — ANESTHESIA (OUTPATIENT)
Dept: LABOR AND DELIVERY | Age: 29
DRG: 560 | End: 2019-11-19
Payer: COMMERCIAL

## 2019-11-19 ENCOUNTER — APPOINTMENT (OUTPATIENT)
Dept: LABOR AND DELIVERY | Age: 29
DRG: 560 | End: 2019-11-19
Payer: COMMERCIAL

## 2019-11-19 ENCOUNTER — ANESTHESIA EVENT (OUTPATIENT)
Dept: LABOR AND DELIVERY | Age: 29
DRG: 560 | End: 2019-11-19
Payer: COMMERCIAL

## 2019-11-19 ENCOUNTER — HOSPITAL ENCOUNTER (INPATIENT)
Age: 29
LOS: 2 days | Discharge: HOME | DRG: 560 | End: 2019-11-21
Attending: OBSTETRICS & GYNECOLOGY | Admitting: OBSTETRICS & GYNECOLOGY
Payer: COMMERCIAL

## 2019-11-19 PROBLEM — O30.043 TWIN PREGNANCY, DICHORIONIC/DIAMNIOTIC, THIRD TRIMESTER: Status: ACTIVE | Noted: 2019-11-19

## 2019-11-19 LAB
ABO: NORMAL
ALBUMIN SERPL-MCNC: 3 G/DL (ref 3.5–5.1)
ALP BLD-CCNC: 367 U/L (ref 38–126)
ALT SERPL-CCNC: 25 U/L (ref 11–66)
AMPHETAMINE+METHAMPHETAMINE URINE SCREEN: NEGATIVE
ANION GAP SERPL CALCULATED.3IONS-SCNC: 18 MEQ/L (ref 8–16)
ANTIBODY SCREEN: NORMAL
AST SERPL-CCNC: 42 U/L (ref 5–40)
BARBITURATE QUANTITATIVE URINE: NEGATIVE
BASOPHILS # BLD: 0.2 %
BASOPHILS ABSOLUTE: 0 THOU/MM3 (ref 0–0.1)
BENZODIAZEPINE QUANTITATIVE URINE: NEGATIVE
BILIRUB SERPL-MCNC: 0.5 MG/DL (ref 0.3–1.2)
BUN BLDV-MCNC: 4 MG/DL (ref 7–22)
CALCIUM SERPL-MCNC: 8.8 MG/DL (ref 8.5–10.5)
CANNABINOID QUANTITATIVE URINE: NEGATIVE
CHLORIDE BLD-SCNC: 107 MEQ/L (ref 98–111)
CO2: 16 MEQ/L (ref 23–33)
COCAINE METABOLITE QUANTITATIVE URINE: NEGATIVE
CREAT SERPL-MCNC: 0.6 MG/DL (ref 0.4–1.2)
CREATININE URINE: 27.2 MG/DL
EOSINOPHIL # BLD: 1.7 %
EOSINOPHILS ABSOLUTE: 0.1 THOU/MM3 (ref 0–0.4)
ERYTHROCYTE [DISTWIDTH] IN BLOOD BY AUTOMATED COUNT: 15.7 % (ref 11.5–14.5)
ERYTHROCYTE [DISTWIDTH] IN BLOOD BY AUTOMATED COUNT: 46.3 FL (ref 35–45)
GFR SERPL CREATININE-BSD FRML MDRD: > 90 ML/MIN/1.73M2
GLUCOSE BLD-MCNC: 57 MG/DL (ref 70–108)
HCT VFR BLD CALC: 37 % (ref 37–47)
HEMOGLOBIN: 10.9 GM/DL (ref 12–16)
IMMATURE GRANS (ABS): 0.02 THOU/MM3 (ref 0–0.07)
IMMATURE GRANULOCYTES: 0.4 %
LYMPHOCYTES # BLD: 50 %
LYMPHOCYTES ABSOLUTE: 2.4 THOU/MM3 (ref 1–4.8)
MCH RBC QN AUTO: 24 PG (ref 26–33)
MCHC RBC AUTO-ENTMCNC: 29.5 GM/DL (ref 32.2–35.5)
MCV RBC AUTO: 81.5 FL (ref 81–99)
MONOCYTES # BLD: 7.4 %
MONOCYTES ABSOLUTE: 0.4 THOU/MM3 (ref 0.4–1.3)
NUCLEATED RED BLOOD CELLS: 0 /100 WBC
OPIATES, URINE: NEGATIVE
OXYCODONE: NEGATIVE
PHENCYCLIDINE QUANTITATIVE URINE: NEGATIVE
PLATELET # BLD: 116 THOU/MM3 (ref 130–400)
PMV BLD AUTO: ABNORMAL FL (ref 9.4–12.4)
POTASSIUM SERPL-SCNC: 4.6 MEQ/L (ref 3.5–5.2)
PROT/CREAT RATIO, UR: 0.72
PROTEIN, URINE: 19.7 MG/DL
RBC # BLD: 4.54 MILL/MM3 (ref 4.2–5.4)
RH FACTOR: NORMAL
RPR: NONREACTIVE
SEG NEUTROPHILS: 40.3 %
SEGMENTED NEUTROPHILS ABSOLUTE COUNT: 1.9 THOU/MM3 (ref 1.8–7.7)
SODIUM BLD-SCNC: 141 MEQ/L (ref 135–145)
TOTAL PROTEIN: 6.6 G/DL (ref 6.1–8)
URIC ACID: 5.7 MG/DL (ref 2.4–5.7)
WBC # BLD: 4.8 THOU/MM3 (ref 4.8–10.8)

## 2019-11-19 PROCEDURE — 86901 BLOOD TYPING SEROLOGIC RH(D): CPT

## 2019-11-19 PROCEDURE — 10907ZC DRAINAGE OF AMNIOTIC FLUID, THERAPEUTIC FROM PRODUCTS OF CONCEPTION, VIA NATURAL OR ARTIFICIAL OPENING: ICD-10-PCS | Performed by: OBSTETRICS & GYNECOLOGY

## 2019-11-19 PROCEDURE — 80053 COMPREHEN METABOLIC PANEL: CPT

## 2019-11-19 PROCEDURE — 2709999900 HC NON-CHARGEABLE SUPPLY

## 2019-11-19 PROCEDURE — 6370000000 HC RX 637 (ALT 250 FOR IP): Performed by: OBSTETRICS & GYNECOLOGY

## 2019-11-19 PROCEDURE — 6360000002 HC RX W HCPCS: Performed by: OBSTETRICS & GYNECOLOGY

## 2019-11-19 PROCEDURE — 88307 TISSUE EXAM BY PATHOLOGIST: CPT

## 2019-11-19 PROCEDURE — 7200000001 HC VAGINAL DELIVERY

## 2019-11-19 PROCEDURE — 86850 RBC ANTIBODY SCREEN: CPT

## 2019-11-19 PROCEDURE — 82570 ASSAY OF URINE CREATININE: CPT

## 2019-11-19 PROCEDURE — 36415 COLL VENOUS BLD VENIPUNCTURE: CPT

## 2019-11-19 PROCEDURE — 3700000025 EPIDURAL BLOCK: Performed by: ANESTHESIOLOGY

## 2019-11-19 PROCEDURE — 2500000003 HC RX 250 WO HCPCS: Performed by: ANESTHESIOLOGY

## 2019-11-19 PROCEDURE — 86592 SYPHILIS TEST NON-TREP QUAL: CPT

## 2019-11-19 PROCEDURE — 80307 DRUG TEST PRSMV CHEM ANLYZR: CPT

## 2019-11-19 PROCEDURE — 51701 INSERT BLADDER CATHETER: CPT

## 2019-11-19 PROCEDURE — 84550 ASSAY OF BLOOD/URIC ACID: CPT

## 2019-11-19 PROCEDURE — 85025 COMPLETE CBC W/AUTO DIFF WBC: CPT

## 2019-11-19 PROCEDURE — 6360000002 HC RX W HCPCS

## 2019-11-19 PROCEDURE — 2580000003 HC RX 258: Performed by: OBSTETRICS & GYNECOLOGY

## 2019-11-19 PROCEDURE — 84156 ASSAY OF PROTEIN URINE: CPT

## 2019-11-19 PROCEDURE — 86900 BLOOD TYPING SEROLOGIC ABO: CPT

## 2019-11-19 PROCEDURE — 1220000001 HC SEMI PRIVATE L&D R&B

## 2019-11-19 RX ORDER — CARBOPROST TROMETHAMINE 250 UG/ML
250 INJECTION, SOLUTION INTRAMUSCULAR PRN
Status: DISCONTINUED | OUTPATIENT
Start: 2019-11-19 | End: 2019-11-19 | Stop reason: SDUPTHER

## 2019-11-19 RX ORDER — MISOPROSTOL 200 UG/1
800 TABLET ORAL
Status: ACTIVE | OUTPATIENT
Start: 2019-11-19 | End: 2019-11-19

## 2019-11-19 RX ORDER — HYDRALAZINE HYDROCHLORIDE 20 MG/ML
10 INJECTION INTRAMUSCULAR; INTRAVENOUS ONCE
Status: COMPLETED | OUTPATIENT
Start: 2019-11-19 | End: 2019-11-19

## 2019-11-19 RX ORDER — LIDOCAINE HYDROCHLORIDE 10 MG/ML
30 INJECTION, SOLUTION EPIDURAL; INFILTRATION; INTRACAUDAL; PERINEURAL PRN
Status: DISCONTINUED | OUTPATIENT
Start: 2019-11-19 | End: 2019-11-19

## 2019-11-19 RX ORDER — MORPHINE SULFATE 4 MG/ML
4 INJECTION, SOLUTION INTRAMUSCULAR; INTRAVENOUS
Status: DISCONTINUED | OUTPATIENT
Start: 2019-11-19 | End: 2019-11-21 | Stop reason: HOSPADM

## 2019-11-19 RX ORDER — METHYLERGONOVINE MALEATE 0.2 MG/ML
200 INJECTION INTRAVENOUS
Status: ACTIVE | OUTPATIENT
Start: 2019-11-19 | End: 2019-11-19

## 2019-11-19 RX ORDER — HYDRALAZINE HYDROCHLORIDE 20 MG/ML
INJECTION INTRAMUSCULAR; INTRAVENOUS
Status: DISCONTINUED
Start: 2019-11-19 | End: 2019-11-19

## 2019-11-19 RX ORDER — HYDROCODONE BITARTRATE AND ACETAMINOPHEN 5; 325 MG/1; MG/1
2 TABLET ORAL EVERY 4 HOURS PRN
Status: DISCONTINUED | OUTPATIENT
Start: 2019-11-19 | End: 2019-11-21 | Stop reason: HOSPADM

## 2019-11-19 RX ORDER — SODIUM CHLORIDE, SODIUM LACTATE, POTASSIUM CHLORIDE, CALCIUM CHLORIDE 600; 310; 30; 20 MG/100ML; MG/100ML; MG/100ML; MG/100ML
INJECTION, SOLUTION INTRAVENOUS CONTINUOUS
Status: DISCONTINUED | OUTPATIENT
Start: 2019-11-19 | End: 2019-11-19

## 2019-11-19 RX ORDER — MORPHINE SULFATE 2 MG/ML
2 INJECTION, SOLUTION INTRAMUSCULAR; INTRAVENOUS
Status: DISCONTINUED | OUTPATIENT
Start: 2019-11-19 | End: 2019-11-19

## 2019-11-19 RX ORDER — HYDROCODONE BITARTRATE AND ACETAMINOPHEN 5; 325 MG/1; MG/1
1 TABLET ORAL EVERY 4 HOURS PRN
Status: DISCONTINUED | OUTPATIENT
Start: 2019-11-19 | End: 2019-11-21 | Stop reason: HOSPADM

## 2019-11-19 RX ORDER — IBUPROFEN 800 MG/1
800 TABLET ORAL EVERY 8 HOURS PRN
Status: DISCONTINUED | OUTPATIENT
Start: 2019-11-19 | End: 2019-11-19

## 2019-11-19 RX ORDER — LANOLIN 100 %
OINTMENT (GRAM) TOPICAL PRN
Status: DISCONTINUED | OUTPATIENT
Start: 2019-11-19 | End: 2019-11-21 | Stop reason: HOSPADM

## 2019-11-19 RX ORDER — SODIUM CHLORIDE, SODIUM LACTATE, POTASSIUM CHLORIDE, CALCIUM CHLORIDE 600; 310; 30; 20 MG/100ML; MG/100ML; MG/100ML; MG/100ML
INJECTION, SOLUTION INTRAVENOUS CONTINUOUS
Status: DISCONTINUED | OUTPATIENT
Start: 2019-11-19 | End: 2019-11-21 | Stop reason: HOSPADM

## 2019-11-19 RX ORDER — BUTORPHANOL TARTRATE 1 MG/ML
1 INJECTION, SOLUTION INTRAMUSCULAR; INTRAVENOUS
Status: DISCONTINUED | OUTPATIENT
Start: 2019-11-19 | End: 2019-11-19

## 2019-11-19 RX ORDER — SODIUM CHLORIDE 0.9 % (FLUSH) 0.9 %
10 SYRINGE (ML) INJECTION EVERY 12 HOURS SCHEDULED
Status: DISCONTINUED | OUTPATIENT
Start: 2019-11-19 | End: 2019-11-19

## 2019-11-19 RX ORDER — NALOXONE HYDROCHLORIDE 0.4 MG/ML
0.4 INJECTION, SOLUTION INTRAMUSCULAR; INTRAVENOUS; SUBCUTANEOUS PRN
Status: DISCONTINUED | OUTPATIENT
Start: 2019-11-19 | End: 2019-11-19

## 2019-11-19 RX ORDER — CALCIUM GLUCONATE 94 MG/ML
1 INJECTION, SOLUTION INTRAVENOUS PRN
Status: DISCONTINUED | OUTPATIENT
Start: 2019-11-19 | End: 2019-11-21 | Stop reason: HOSPADM

## 2019-11-19 RX ORDER — IBUPROFEN 800 MG/1
800 TABLET ORAL 3 TIMES DAILY
Status: DISCONTINUED | OUTPATIENT
Start: 2019-11-19 | End: 2019-11-21 | Stop reason: HOSPADM

## 2019-11-19 RX ORDER — MORPHINE SULFATE 4 MG/ML
4 INJECTION, SOLUTION INTRAMUSCULAR; INTRAVENOUS
Status: DISCONTINUED | OUTPATIENT
Start: 2019-11-19 | End: 2019-11-19

## 2019-11-19 RX ORDER — METHYLERGONOVINE MALEATE 0.2 MG/ML
200 INJECTION INTRAVENOUS PRN
Status: DISCONTINUED | OUTPATIENT
Start: 2019-11-19 | End: 2019-11-19

## 2019-11-19 RX ORDER — NALBUPHINE HCL 10 MG/ML
5 AMPUL (ML) INJECTION EVERY 4 HOURS PRN
Status: DISCONTINUED | OUTPATIENT
Start: 2019-11-19 | End: 2019-11-19

## 2019-11-19 RX ORDER — ONDANSETRON 4 MG/1
8 TABLET, FILM COATED ORAL EVERY 8 HOURS PRN
Status: DISCONTINUED | OUTPATIENT
Start: 2019-11-19 | End: 2019-11-21 | Stop reason: HOSPADM

## 2019-11-19 RX ORDER — TERBUTALINE SULFATE 1 MG/ML
0.25 INJECTION, SOLUTION SUBCUTANEOUS
Status: DISCONTINUED | OUTPATIENT
Start: 2019-11-19 | End: 2019-11-19

## 2019-11-19 RX ORDER — FERROUS SULFATE 325(65) MG
325 TABLET ORAL
Status: DISCONTINUED | OUTPATIENT
Start: 2019-11-20 | End: 2019-11-21 | Stop reason: HOSPADM

## 2019-11-19 RX ORDER — HYDRALAZINE HYDROCHLORIDE 20 MG/ML
5 INJECTION INTRAMUSCULAR; INTRAVENOUS ONCE
Status: COMPLETED | OUTPATIENT
Start: 2019-11-19 | End: 2019-11-19

## 2019-11-19 RX ORDER — SEVOFLURANE 250 ML/250ML
1 LIQUID RESPIRATORY (INHALATION) CONTINUOUS PRN
Status: DISCONTINUED | OUTPATIENT
Start: 2019-11-19 | End: 2019-11-19

## 2019-11-19 RX ORDER — ALBUTEROL SULFATE 90 UG/1
2 AEROSOL, METERED RESPIRATORY (INHALATION) EVERY 4 HOURS PRN
Status: DISCONTINUED | OUTPATIENT
Start: 2019-11-19 | End: 2019-11-21 | Stop reason: HOSPADM

## 2019-11-19 RX ORDER — ONDANSETRON 2 MG/ML
4 INJECTION INTRAMUSCULAR; INTRAVENOUS EVERY 6 HOURS PRN
Status: DISCONTINUED | OUTPATIENT
Start: 2019-11-19 | End: 2019-11-19

## 2019-11-19 RX ORDER — ONDANSETRON 2 MG/ML
4 INJECTION INTRAMUSCULAR; INTRAVENOUS EVERY 6 HOURS PRN
Status: DISCONTINUED | OUTPATIENT
Start: 2019-11-19 | End: 2019-11-21 | Stop reason: HOSPADM

## 2019-11-19 RX ORDER — MISOPROSTOL 200 UG/1
1000 TABLET ORAL PRN
Status: DISCONTINUED | OUTPATIENT
Start: 2019-11-19 | End: 2019-11-19

## 2019-11-19 RX ORDER — MORPHINE SULFATE 2 MG/ML
2 INJECTION, SOLUTION INTRAMUSCULAR; INTRAVENOUS
Status: DISCONTINUED | OUTPATIENT
Start: 2019-11-19 | End: 2019-11-21 | Stop reason: HOSPADM

## 2019-11-19 RX ORDER — ONDANSETRON 2 MG/ML
8 INJECTION INTRAMUSCULAR; INTRAVENOUS EVERY 6 HOURS PRN
Status: DISCONTINUED | OUTPATIENT
Start: 2019-11-19 | End: 2019-11-19

## 2019-11-19 RX ORDER — ACETAMINOPHEN 325 MG/1
650 TABLET ORAL EVERY 4 HOURS PRN
Status: DISCONTINUED | OUTPATIENT
Start: 2019-11-19 | End: 2019-11-21 | Stop reason: HOSPADM

## 2019-11-19 RX ORDER — DIPHENHYDRAMINE HYDROCHLORIDE 50 MG/ML
25 INJECTION INTRAMUSCULAR; INTRAVENOUS EVERY 4 HOURS PRN
Status: DISCONTINUED | OUTPATIENT
Start: 2019-11-19 | End: 2019-11-19

## 2019-11-19 RX ORDER — SODIUM CHLORIDE 0.9 % (FLUSH) 0.9 %
10 SYRINGE (ML) INJECTION PRN
Status: DISCONTINUED | OUTPATIENT
Start: 2019-11-19 | End: 2019-11-19

## 2019-11-19 RX ORDER — MAGNESIUM SULFATE IN WATER 40 MG/ML
4 INJECTION, SOLUTION INTRAVENOUS ONCE
Status: DISCONTINUED | OUTPATIENT
Start: 2019-11-19 | End: 2019-11-19

## 2019-11-19 RX ORDER — CARBOPROST TROMETHAMINE 250 UG/ML
250 INJECTION, SOLUTION INTRAMUSCULAR
Status: ACTIVE | OUTPATIENT
Start: 2019-11-19 | End: 2019-11-19

## 2019-11-19 RX ORDER — DOCUSATE SODIUM 100 MG/1
100 CAPSULE, LIQUID FILLED ORAL 2 TIMES DAILY
Status: DISCONTINUED | OUTPATIENT
Start: 2019-11-19 | End: 2019-11-21 | Stop reason: HOSPADM

## 2019-11-19 RX ORDER — DOCUSATE SODIUM 100 MG/1
100 CAPSULE, LIQUID FILLED ORAL 2 TIMES DAILY PRN
Status: DISCONTINUED | OUTPATIENT
Start: 2019-11-19 | End: 2019-11-21 | Stop reason: HOSPADM

## 2019-11-19 RX ORDER — SODIUM CHLORIDE 0.9 % (FLUSH) 0.9 %
10 SYRINGE (ML) INJECTION PRN
Status: DISCONTINUED | OUTPATIENT
Start: 2019-11-19 | End: 2019-11-21 | Stop reason: HOSPADM

## 2019-11-19 RX ORDER — SODIUM CHLORIDE 0.9 % (FLUSH) 0.9 %
10 SYRINGE (ML) INJECTION EVERY 12 HOURS SCHEDULED
Status: DISCONTINUED | OUTPATIENT
Start: 2019-11-19 | End: 2019-11-21 | Stop reason: HOSPADM

## 2019-11-19 RX ORDER — ACETAMINOPHEN 325 MG/1
650 TABLET ORAL EVERY 4 HOURS PRN
Status: DISCONTINUED | OUTPATIENT
Start: 2019-11-19 | End: 2019-11-19

## 2019-11-19 RX ADMIN — DOCUSATE SODIUM 100 MG: 100 CAPSULE, LIQUID FILLED ORAL at 15:12

## 2019-11-19 RX ADMIN — HYDRALAZINE HYDROCHLORIDE 5 MG: 20 INJECTION, SOLUTION INTRAMUSCULAR; INTRAVENOUS at 08:26

## 2019-11-19 RX ADMIN — MAGNESIUM SULFATE HEPTAHYDRATE 2 G/HR: 40 INJECTION, SOLUTION INTRAVENOUS at 22:18

## 2019-11-19 RX ADMIN — SODIUM CHLORIDE, POTASSIUM CHLORIDE, SODIUM LACTATE AND CALCIUM CHLORIDE: 600; 310; 30; 20 INJECTION, SOLUTION INTRAVENOUS at 08:38

## 2019-11-19 RX ADMIN — HYDRALAZINE HYDROCHLORIDE 10 MG: 20 INJECTION, SOLUTION INTRAMUSCULAR; INTRAVENOUS at 12:01

## 2019-11-19 RX ADMIN — IBUPROFEN 800 MG: 800 TABLET, FILM COATED ORAL at 12:30

## 2019-11-19 RX ADMIN — Medication 18 ML/HR: at 09:27

## 2019-11-19 RX ADMIN — IBUPROFEN 800 MG: 800 TABLET, FILM COATED ORAL at 19:59

## 2019-11-19 RX ADMIN — MAGNESIUM SULFATE HEPTAHYDRATE 2 G/HR: 40 INJECTION, SOLUTION INTRAVENOUS at 12:45

## 2019-11-19 RX ADMIN — MAGNESIUM SULFATE HEPTAHYDRATE 6 G: 500 INJECTION, SOLUTION INTRAMUSCULAR; INTRAVENOUS at 12:14

## 2019-11-19 RX ADMIN — HYDROCODONE BITARTRATE AND ACETAMINOPHEN 1 TABLET: 5; 325 TABLET ORAL at 22:09

## 2019-11-19 RX ADMIN — SODIUM CHLORIDE, POTASSIUM CHLORIDE, SODIUM LACTATE AND CALCIUM CHLORIDE: 600; 310; 30; 20 INJECTION, SOLUTION INTRAVENOUS at 06:00

## 2019-11-19 RX ADMIN — Medication 1 MILLI-UNITS/MIN: at 06:44

## 2019-11-19 RX ADMIN — HYDROCODONE BITARTRATE AND ACETAMINOPHEN 1 TABLET: 5; 325 TABLET ORAL at 13:53

## 2019-11-19 ASSESSMENT — PAIN DESCRIPTION - DESCRIPTORS
DESCRIPTORS: CRAMPING
DESCRIPTORS: ACHING;CRAMPING

## 2019-11-19 ASSESSMENT — PAIN SCALES - GENERAL
PAINLEVEL_OUTOF10: 7
PAINLEVEL_OUTOF10: 8
PAINLEVEL_OUTOF10: 9
PAINLEVEL_OUTOF10: 7

## 2019-11-20 PROCEDURE — 6370000000 HC RX 637 (ALT 250 FOR IP): Performed by: OBSTETRICS & GYNECOLOGY

## 2019-11-20 PROCEDURE — 94640 AIRWAY INHALATION TREATMENT: CPT

## 2019-11-20 PROCEDURE — 6360000002 HC RX W HCPCS: Performed by: OBSTETRICS & GYNECOLOGY

## 2019-11-20 PROCEDURE — 1200000000 HC SEMI PRIVATE

## 2019-11-20 PROCEDURE — 2709999900 HC NON-CHARGEABLE SUPPLY

## 2019-11-20 RX ADMIN — Medication 2 PUFF: at 21:27

## 2019-11-20 RX ADMIN — MAGNESIUM SULFATE HEPTAHYDRATE 2 G/HR: 40 INJECTION, SOLUTION INTRAVENOUS at 07:54

## 2019-11-20 RX ADMIN — IBUPROFEN 800 MG: 800 TABLET, FILM COATED ORAL at 18:09

## 2019-11-20 RX ADMIN — HYDROCODONE BITARTRATE AND ACETAMINOPHEN 1 TABLET: 5; 325 TABLET ORAL at 18:08

## 2019-11-20 RX ADMIN — IBUPROFEN 800 MG: 800 TABLET, FILM COATED ORAL at 03:56

## 2019-11-20 ASSESSMENT — PAIN SCALES - GENERAL
PAINLEVEL_OUTOF10: 8
PAINLEVEL_OUTOF10: 7

## 2019-11-21 VITALS
SYSTOLIC BLOOD PRESSURE: 143 MMHG | HEIGHT: 66 IN | TEMPERATURE: 98.6 F | BODY MASS INDEX: 27.48 KG/M2 | HEART RATE: 64 BPM | WEIGHT: 171 LBS | DIASTOLIC BLOOD PRESSURE: 84 MMHG | RESPIRATION RATE: 16 BRPM | OXYGEN SATURATION: 99 %

## 2019-11-21 PROCEDURE — 6370000000 HC RX 637 (ALT 250 FOR IP): Performed by: OBSTETRICS & GYNECOLOGY

## 2019-11-21 PROCEDURE — 2580000003 HC RX 258: Performed by: OBSTETRICS & GYNECOLOGY

## 2019-11-21 PROCEDURE — 2709999900 HC NON-CHARGEABLE SUPPLY

## 2019-11-21 PROCEDURE — 94640 AIRWAY INHALATION TREATMENT: CPT

## 2019-11-21 RX ADMIN — HYDROCODONE BITARTRATE AND ACETAMINOPHEN 1 TABLET: 5; 325 TABLET ORAL at 04:25

## 2019-11-21 RX ADMIN — IBUPROFEN 800 MG: 800 TABLET, FILM COATED ORAL at 15:09

## 2019-11-21 RX ADMIN — DOCUSATE SODIUM 100 MG: 100 CAPSULE, LIQUID FILLED ORAL at 07:53

## 2019-11-21 RX ADMIN — DOCUSATE SODIUM 100 MG: 100 CAPSULE, LIQUID FILLED ORAL at 00:19

## 2019-11-21 RX ADMIN — HYDROCODONE BITARTRATE AND ACETAMINOPHEN 1 TABLET: 5; 325 TABLET ORAL at 08:54

## 2019-11-21 RX ADMIN — Medication 10 ML: at 10:21

## 2019-11-21 RX ADMIN — IBUPROFEN 800 MG: 800 TABLET, FILM COATED ORAL at 04:25

## 2019-11-21 RX ADMIN — Medication 10 ML: at 00:20

## 2019-11-21 RX ADMIN — Medication 2 PUFF: at 10:34

## 2019-11-21 RX ADMIN — HYDROCODONE BITARTRATE AND ACETAMINOPHEN 1 TABLET: 5; 325 TABLET ORAL at 15:09

## 2019-11-21 ASSESSMENT — PAIN SCALES - GENERAL
PAINLEVEL_OUTOF10: 5
PAINLEVEL_OUTOF10: 0
PAINLEVEL_OUTOF10: 5
PAINLEVEL_OUTOF10: 8
PAINLEVEL_OUTOF10: 5
PAINLEVEL_OUTOF10: 0
PAINLEVEL_OUTOF10: 8
PAINLEVEL_OUTOF10: 3
PAINLEVEL_OUTOF10: 0
PAINLEVEL_OUTOF10: 5
PAINLEVEL_OUTOF10: 0

## 2019-11-21 ASSESSMENT — PAIN DESCRIPTION - PAIN TYPE: TYPE: ACUTE PAIN

## 2019-11-21 ASSESSMENT — PAIN DESCRIPTION - PROGRESSION: CLINICAL_PROGRESSION: NOT CHANGED

## 2019-11-21 ASSESSMENT — PAIN DESCRIPTION - LOCATION: LOCATION: ABDOMEN

## 2019-11-21 ASSESSMENT — PAIN DESCRIPTION - ONSET: ONSET: ON-GOING

## 2019-11-21 ASSESSMENT — PAIN - FUNCTIONAL ASSESSMENT: PAIN_FUNCTIONAL_ASSESSMENT: ACTIVITIES ARE NOT PREVENTED

## 2019-11-21 ASSESSMENT — PAIN DESCRIPTION - DESCRIPTORS: DESCRIPTORS: CRAMPING

## 2019-11-21 ASSESSMENT — PAIN DESCRIPTION - FREQUENCY: FREQUENCY: INTERMITTENT

## 2020-01-21 ENCOUNTER — HOSPITAL ENCOUNTER (EMERGENCY)
Age: 30
Discharge: HOME | End: 2020-01-21
Payer: COMMERCIAL

## 2020-01-21 VITALS
HEART RATE: 87 BPM | RESPIRATION RATE: 16 BRPM | WEIGHT: 144 LBS | BODY MASS INDEX: 23.14 KG/M2 | SYSTOLIC BLOOD PRESSURE: 129 MMHG | HEIGHT: 66 IN | OXYGEN SATURATION: 98 % | TEMPERATURE: 99.5 F | DIASTOLIC BLOOD PRESSURE: 73 MMHG

## 2020-01-21 PROCEDURE — 99213 OFFICE O/P EST LOW 20 MIN: CPT | Performed by: NURSE PRACTITIONER

## 2020-01-21 PROCEDURE — 99212 OFFICE O/P EST SF 10 MIN: CPT

## 2020-01-21 RX ORDER — CETIRIZINE HYDROCHLORIDE 10 MG/1
10 TABLET ORAL DAILY
COMMUNITY
End: 2020-08-10

## 2020-01-21 RX ORDER — AMOXICILLIN AND CLAVULANATE POTASSIUM 875; 125 MG/1; MG/1
1 TABLET, FILM COATED ORAL 2 TIMES DAILY
Qty: 14 TABLET | Refills: 0 | Status: SHIPPED | OUTPATIENT
Start: 2020-01-21 | End: 2020-01-28

## 2020-01-21 RX ORDER — PREDNISONE 20 MG/1
40 TABLET ORAL DAILY
Qty: 14 TABLET | Refills: 0 | Status: SHIPPED | OUTPATIENT
Start: 2020-01-21 | End: 2020-01-28

## 2020-01-21 ASSESSMENT — PAIN SCALES - GENERAL: PAINLEVEL_OUTOF10: 7

## 2020-01-21 ASSESSMENT — PAIN DESCRIPTION - DESCRIPTORS: DESCRIPTORS: ACHING

## 2020-01-21 ASSESSMENT — ENCOUNTER SYMPTOMS
VOMITING: 0
COUGH: 1
SHORTNESS OF BREATH: 0
NAUSEA: 0
SORE THROAT: 0

## 2020-01-21 ASSESSMENT — PAIN DESCRIPTION - ORIENTATION: ORIENTATION: RIGHT

## 2020-01-21 ASSESSMENT — PAIN DESCRIPTION - PAIN TYPE: TYPE: ACUTE PAIN

## 2020-01-21 ASSESSMENT — PAIN DESCRIPTION - LOCATION: LOCATION: EAR

## 2020-01-21 NOTE — ED PROVIDER NOTES
[place] [address]  Urgent Care Encounter       CHIEF COMPLAINT       Chief Complaint   Patient presents with    Cough     prod. lots of green  phlegm causing chest to hurt with cough. ear pain       Nurses Notes reviewed and I agree except as noted in the HPI. HISTORY OF PRESENT ILLNESS   Naldo Gilbert is a 30 y.o. female who presents for evaluation of cough and congestion is been ongoing for the past 4 to 6 weeks. Patient states that she is also had some green postnasal drainage and sinus pressure. She states that for the past 3 days she is also began to have ear pain. She denies any significant fever recorded at home recently. States that she is been taking ibuprofen without any other medications. The history is provided by the patient. REVIEW OF SYSTEMS     Review of Systems   Constitutional: Positive for chills. Negative for fever. HENT: Positive for congestion and ear pain. Negative for sore throat. Respiratory: Positive for cough. Negative for shortness of breath. Cardiovascular: Negative for chest pain. Gastrointestinal: Negative for nausea and vomiting. Musculoskeletal: Negative for arthralgias and myalgias. Skin: Negative for rash. Allergic/Immunologic: Negative for environmental allergies. Neurological: Negative for headaches. PAST MEDICAL HISTORY         Diagnosis Date    Anemia     on iron supplements    Asthma     Albuterol and Advair    Bladder prolapse, female, acquired     Constipation     Headache(784.0)     Interstitial cystitis     Mental disorder     bipolar    Sickle cell anemia (HCC)     Has trait       SURGICALHISTORY     Patient  has a past surgical history that includes Ankle surgery; Colonoscopy (2013); laparoscopy (7/1/13); [place] tooth extraction (2014); Cystocopy (9/28/15); Foot surgery (Bilateral, 06/22/2016); Dilation and curettage of uterus;  Foot surgery (Left, 09/27/2017); pr gastrocnemius recession (Left, 9/27/2017); other surgical history (Left, 02/14/2018); pr removal deep implant (Left, 2/14/2018); Foot surgery (Right, 03/28/2018); and pr full excis 5th metatarsal head (Right, 3/28/2018). CURRENT MEDICATIONS       Previous Medications    ACETAMINOPHEN (TYLENOL) 325 MG TABLET    Take 500 mg by mouth every 6 hours as needed for Pain     ADVAIR -21 MCG/ACT INHALER    Inhale 1 puff into the lungs 2 times daily. ALBUTEROL (PROVENTIL HFA) 108 (90 BASE) MCG/ACT INHALER    Inhale 2 puffs into the lungs every 4 hours as needed for Wheezing or Shortness of Breath (Space out to every 6 hours as symptoms improve). Space out to every 6 hours as symptoms improve. CETIRIZINE (ZYRTEC) 10 MG TABLET    Take 10 mg by mouth daily    ONDANSETRON (ZOFRAN-ODT) 4 MG DISINTEGRATING TABLET    Take 1 tablet by mouth 3 times daily as needed for Nausea or Vomiting    PRENATAL VIT-DSS-FE FUM-FA PO    Take  by mouth. ALLERGIES     Patient is has No Known Allergies. Patients   Immunization History   Administered Date(s) Administered    Tdap (Boostrix, Adacel) 05/15/2014, 02/22/2017       FAMILY HISTORY     Patient's family history includes Asthma in her father and mother; Depression in her mother; Heart Disease in her maternal grandmother; High Blood Pressure in her maternal grandmother. SOCIAL HISTORY     Patient  reports that she has never smoked. She has never used smokeless tobacco. She reports that she does not drink alcohol or use drugs. PHYSICAL EXAM     ED TRIAGE VITALS  BP: 129/73, Temp: 99.5 °F (37.5 °C), Pulse: 87, Resp: 16, SpO2: 98 %,Estimated body mass index is 23.24 kg/m² as calculated from the following:    Height as of this encounter: 5' 6\" (1.676 m). Weight as of this encounter: 144 lb (65.3 kg). ,Patient's last menstrual period was 01/02/2020 (approximate). Physical Exam  Vitals signs and nursing note reviewed. Constitutional:       General: She is not in acute distress.      Appearance: She is well-developed. She is not diaphoretic. HENT:      Right Ear: Tympanic membrane is erythematous and bulging. Left Ear: Tympanic membrane normal.      Nose:      Right Sinus: Maxillary sinus tenderness present. No frontal sinus tenderness. Left Sinus: Maxillary sinus tenderness present. No frontal sinus tenderness. Mouth/Throat:      Pharynx: Oropharynx is clear. Tonsils: No tonsillar exudate. Swellin on the left. Eyes:      Conjunctiva/sclera:      Right eye: Right conjunctiva is not injected. Left eye: Left conjunctiva is not injected. Pupils: Pupils are equal.   Neck:      Musculoskeletal: Normal range of motion. Cardiovascular:      Rate and Rhythm: Normal rate and regular rhythm. Heart sounds: No murmur. Pulmonary:      Effort: Pulmonary effort is normal. No respiratory distress. Breath sounds: Normal breath sounds. Musculoskeletal:      Right knee: She exhibits normal range of motion. Left knee: She exhibits normal range of motion. Skin:     General: Skin is warm. Findings: No rash. Neurological:      Mental Status: She is alert and oriented to person, place, and time. Psychiatric:         Behavior: Behavior normal.         DIAGNOSTIC RESULTS     Labs:No results found for this visit on 20. IMAGING:    No orders to display         EKG:  none    URGENT CARE COURSE:     Vitals:    20 1135   BP: 129/73   Pulse: 87   Resp: 16   Temp: 99.5 °F (37.5 °C)   TempSrc: Temporal   SpO2: 98%   Weight: 144 lb (65.3 kg)   Height: 5' 6\" (1.676 m)       Medications - No data to display         PROCEDURES:  None    FINAL IMPRESSION      1. Acute maxillary sinusitis, recurrence not specified          DISPOSITION/ PLAN       Exam is consistent with maxillary sinusitis.   I discussed with the patient the plan to treat with oral antibiotics as well as prednisone and she is advised to follow-up on an outpatient basis if her symptoms do not improve. She is agreeable to plan as discussed.     PATIENT REFERRED TO:  MD FELICITAS Melvin/Jung Tao / [place] [address]      DISCHARGE MEDICATIONS:  New Prescriptions    AMOXICILLIN-CLAVULANATE (AUGMENTIN) 875-125 MG PER TABLET    Take 1 tablet by mouth 2 times daily for 7 days    PREDNISONE (DELTASONE) 20 MG TABLET    Take 2 tablets by mouth daily for 7 days       Discontinued Medications    LORATADINE (CLARITIN) 10 MG TABLET    Take 1 tablet by mouth daily       Current Discharge Medication List          ANDERSON Hardin CNP    (Please note that portions of this note were completed with a voice recognition program. Efforts were made to edit the dictations but occasionally words are mis-transcribed.)          ANDERSON Hardin CNP  01/21/20 [id number]

## 2020-04-18 ENCOUNTER — APPOINTMENT (OUTPATIENT)
Dept: CT IMAGING | Age: 30
End: 2020-04-18
Payer: COMMERCIAL

## 2020-04-18 ENCOUNTER — HOSPITAL ENCOUNTER (EMERGENCY)
Age: 30
Discharge: HOME | End: 2020-04-18
Payer: COMMERCIAL

## 2020-04-18 VITALS
HEART RATE: 87 BPM | HEIGHT: 66 IN | DIASTOLIC BLOOD PRESSURE: 82 MMHG | BODY MASS INDEX: 22.5 KG/M2 | TEMPERATURE: 98.3 F | WEIGHT: 140 LBS | OXYGEN SATURATION: 100 % | RESPIRATION RATE: 18 BRPM | SYSTOLIC BLOOD PRESSURE: 130 MMHG

## 2020-04-18 LAB
ABO: NORMAL
ANION GAP SERPL CALCULATED.3IONS-SCNC: 10 MEQ/L (ref 8–16)
BASOPHILS # BLD: 0.8 %
BASOPHILS ABSOLUTE: 0 THOU/MM3 (ref 0–0.1)
BILIRUBIN URINE: NEGATIVE
BLOOD, URINE: NEGATIVE
BUN BLDV-MCNC: 13 MG/DL (ref 7–22)
CALCIUM SERPL-MCNC: 8.8 MG/DL (ref 8.5–10.5)
CHARACTER, URINE: CLEAR
CHLORIDE BLD-SCNC: 105 MEQ/L (ref 98–111)
CO2: 22 MEQ/L (ref 23–33)
COLOR: YELLOW
CREAT SERPL-MCNC: 0.7 MG/DL (ref 0.4–1.2)
EOSINOPHIL # BLD: 4.6 %
EOSINOPHILS ABSOLUTE: 0.2 THOU/MM3 (ref 0–0.4)
ERYTHROCYTE [DISTWIDTH] IN BLOOD BY AUTOMATED COUNT: 14.5 % (ref 11.5–14.5)
ERYTHROCYTE [DISTWIDTH] IN BLOOD BY AUTOMATED COUNT: 44.2 FL (ref 35–45)
GFR SERPL CREATININE-BSD FRML MDRD: > 90 ML/MIN/1.73M2
GLUCOSE BLD-MCNC: 92 MG/DL (ref 70–108)
GLUCOSE URINE: NEGATIVE MG/DL
HCT VFR BLD CALC: 39.9 % (ref 37–47)
HEMOGLOBIN: 11.7 GM/DL (ref 12–16)
IMMATURE GRANS (ABS): 0 THOU/MM3 (ref 0–0.07)
IMMATURE GRANULOCYTES: 0 %
KETONES, URINE: NEGATIVE
KOH PREP: NORMAL
LEUKOCYTE ESTERASE, URINE: NEGATIVE
LYMPHOCYTES # BLD: 54.5 %
LYMPHOCYTES ABSOLUTE: 2.7 THOU/MM3 (ref 1–4.8)
MCH RBC QN AUTO: 24.6 PG (ref 26–33)
MCHC RBC AUTO-ENTMCNC: 29.3 GM/DL (ref 32.2–35.5)
MCV RBC AUTO: 84 FL (ref 81–99)
MONOCYTES # BLD: 8.2 %
MONOCYTES ABSOLUTE: 0.4 THOU/MM3 (ref 0.4–1.3)
NITRITE, URINE: NEGATIVE
NUCLEATED RED BLOOD CELLS: 0 /100 WBC
OSMOLALITY CALCULATION: 273.6 MOSMOL/KG (ref 275–300)
PH UA: 6.5 (ref 5–9)
PLATELET # BLD: 262 THOU/MM3 (ref 130–400)
PMV BLD AUTO: 11.2 FL (ref 9.4–12.4)
POTASSIUM REFLEX MAGNESIUM: 5 MEQ/L (ref 3.5–5.2)
PREGNANCY, SERUM: NEGATIVE
PROTEIN UA: NEGATIVE
RBC # BLD: 4.75 MILL/MM3 (ref 4.2–5.4)
RH FACTOR: NORMAL
SEG NEUTROPHILS: 31.9 %
SEGMENTED NEUTROPHILS ABSOLUTE COUNT: 1.6 THOU/MM3 (ref 1.8–7.7)
SODIUM BLD-SCNC: 137 MEQ/L (ref 135–145)
SPECIFIC GRAVITY, URINE: 1.02 (ref 1–1.03)
TRICHOMONAS PREP: NORMAL
UROBILINOGEN, URINE: 0.2 EU/DL (ref 0–1)
WBC # BLD: 5 THOU/MM3 (ref 4.8–10.8)

## 2020-04-18 PROCEDURE — 86901 BLOOD TYPING SEROLOGIC RH(D): CPT

## 2020-04-18 PROCEDURE — 85025 COMPLETE CBC W/AUTO DIFF WBC: CPT

## 2020-04-18 PROCEDURE — 81003 URINALYSIS AUTO W/O SCOPE: CPT

## 2020-04-18 PROCEDURE — 99284 EMERGENCY DEPT VISIT MOD MDM: CPT

## 2020-04-18 PROCEDURE — 80048 BASIC METABOLIC PNL TOTAL CA: CPT

## 2020-04-18 PROCEDURE — 36415 COLL VENOUS BLD VENIPUNCTURE: CPT

## 2020-04-18 PROCEDURE — 87210 SMEAR WET MOUNT SALINE/INK: CPT

## 2020-04-18 PROCEDURE — 86900 BLOOD TYPING SEROLOGIC ABO: CPT

## 2020-04-18 PROCEDURE — 74176 CT ABD & PELVIS W/O CONTRAST: CPT

## 2020-04-18 PROCEDURE — 87220 TISSUE EXAM FOR FUNGI: CPT

## 2020-04-18 PROCEDURE — 84703 CHORIONIC GONADOTROPIN ASSAY: CPT

## 2020-04-18 PROCEDURE — 87070 CULTURE OTHR SPECIMN AEROBIC: CPT

## 2020-04-18 PROCEDURE — 87205 SMEAR GRAM STAIN: CPT

## 2020-04-18 ASSESSMENT — ENCOUNTER SYMPTOMS
CONSTIPATION: 0
WHEEZING: 0
SINUS PAIN: 0
RHINORRHEA: 0
SINUS PRESSURE: 0
EYE REDNESS: 0
VOMITING: 0
ABDOMINAL PAIN: 0
EYE PAIN: 0
DIARRHEA: 0
COUGH: 0
SHORTNESS OF BREATH: 0
SORE THROAT: 0
NAUSEA: 0
CHEST TIGHTNESS: 0
PHOTOPHOBIA: 0

## 2020-04-18 ASSESSMENT — PAIN SCALES - GENERAL
PAINLEVEL_OUTOF10: 6
PAINLEVEL_OUTOF10: 7

## 2020-04-18 ASSESSMENT — PAIN DESCRIPTION - LOCATION: LOCATION: ABDOMEN

## 2020-04-18 ASSESSMENT — PAIN DESCRIPTION - PROGRESSION: CLINICAL_PROGRESSION: NOT CHANGED

## 2020-04-18 NOTE — ED PROVIDER NOTES
[facility]  EMERGENCY MEDICINE ATTENDING ATTESTATION      Evaluation of Professor Pippa Bernabe 192. Case discussed and care plan developed with physician assistant. I agree with the note and plan as documented by him, except if my documentation differs. Patient case discussed with me after patient care had been completed. Please see the physician assistant completed note for final disposition except as documented on this attestation. Diagnosis, treatment and disposition plans were discussed and agreed upon by patient. This transcription was electronically signed. It was dictated by use of voice recognition software and electronically transcribed. The transcription may contain errors not detected in proofreading.        Electronically signed by Lokesh Bernabe MD on 4/18/20 at 5:23 PM EDT          Lokesh Bernabe MD  04/18/20 [id number]

## 2020-04-18 NOTE — ED NOTES
Upon first contact with patient this RN receives bedside shift report Gisele Sharp RN. Alert and oriented. Respirations easy and unlabored. Reports low back pain, denies need for pain medication at this time. Denies needs or concerns at this time. Notified that [place], [place] should be in to update on results.         Rony Alvarez RN  04/18/20 [id number]

## 2020-04-18 NOTE — ED PROVIDER NOTES
HISTORY       Family History   Problem Relation Age of Onset    Asthma Mother     Depression Mother     Asthma Father     Heart Disease Maternal Grandmother     High Blood Pressure Maternal Grandmother         SOCIAL HISTORY       Social History     Socioeconomic History    Marital status: Single     Spouse name: None    Number of children: None    Years of education: None    Highest education level: None   Occupational History    None   Social Needs    Financial resource strain: None    Food insecurity     Worry: None     Inability: None    Transportation needs     Medical: None     Non-medical: None   Tobacco Use    Smoking status: Never Smoker    Smokeless tobacco: Never Used   Substance and Sexual Activity    Alcohol use: No    Drug use: No    Sexual activity: Yes     Partners: Male   Lifestyle    Physical activity     Days per week: None     Minutes per session: None    Stress: None   Relationships    Social connections     Talks on phone: None     Gets together: None     Attends Christianity service: None     Active member of club or organization: None     Attends meetings of clubs or organizations: None     Relationship status: None    Intimate partner violence     Fear of current or ex partner: None     Emotionally abused: None     Physically abused: None     Forced sexual activity: None   Other Topics Concern    None   Social History Narrative    None       REVIEW OF SYSTEMS     Review of Systems   Constitutional: Negative for chills, diaphoresis and fever. HENT: Negative for congestion, ear pain, rhinorrhea, sinus pressure, sinus pain and sore throat. Eyes: Negative for photophobia, pain and redness. Respiratory: Negative for cough, chest tightness, shortness of breath and wheezing. Cardiovascular: Negative for chest pain, palpitations and leg swelling. Gastrointestinal: Negative for abdominal pain, constipation, diarrhea, nausea and vomiting.    Genitourinary: Positive for TO MG FOR LOW K - Abnormal; Notable for the following components:    CO2 22 (*)     All other components within normal limits   OSMOLALITY - Abnormal; Notable for the following components:    Osmolality Calc 273.6 (*)     All other components within normal limits   YAN Aguilera)    Narrative:     Source: vaginal non-OB patient       Site: swab          Current Antibiotics: not stated   WET PREP, GENITAL    Narrative:     Source: vaginal non-OB patient       Site: swab          Current Antibiotics: not stated   CULTURE, GENITAL    Narrative:     Source: vaginal non-OB patient       Site: swab          Current Antibiotics: not stated   HCG, SERUM, QUALITATIVE   URINE RT REFLEX TO CULTURE   ANION GAP   GLOMERULAR FILTRATION RATE, ESTIMATED   ABO/RH       All other labs were within normal range or not returned as of this dictation. Please note, any cultures that may have been sent were not resulted at the time of this patient visit. EMERGENCY DEPARTMENT COURSE andMedical Decision Making:     MDM/   This is a 71-year-old female who presented to the ED with a chief complaint of chronic dysmenorrhea and an acute exacerbation of vaginal bleeding over the past 3 days. On initial exam the patient was not in acute distress, hemodynamically stable, no signs of active hemorrhaging. Hgb 11.7, history of anemia baseline 10.9-13 over past year. Serum hCG was negative. Pelvic exam notable for blood in vault, closed os, no d/c. CT abdomen and pelvis obtained related to suprapubic tenderness demonstrated no emergent pathologies. Do not believe this to be ectopic, ovarian cyst, ovarian torsion, or vaginal laceration. Recommended patient continue on oral progesterone for uterine bleeding and follow-up with OB/GYN for monitoring/treatment of AUB. Recommend patient contact her OB/GYN on Monday 4/20/2020 to see if she can reschedule her F/U appointment for a sooner date then April 30.  Patient is stable to return home, no

## 2020-04-21 LAB
GENITAL CULTURE, ROUTINE: NORMAL
GRAM STAIN RESULT: NORMAL

## 2020-06-25 ENCOUNTER — HOSPITAL ENCOUNTER (EMERGENCY)
Age: 30
Discharge: HOME | End: 2020-06-25
Payer: COMMERCIAL

## 2020-06-25 VITALS
TEMPERATURE: 97.9 F | RESPIRATION RATE: 16 BRPM | OXYGEN SATURATION: 99 % | WEIGHT: 163 LBS | SYSTOLIC BLOOD PRESSURE: 132 MMHG | BODY MASS INDEX: 26.31 KG/M2 | DIASTOLIC BLOOD PRESSURE: 83 MMHG | HEART RATE: 82 BPM

## 2020-06-25 LAB
BILIRUBIN URINE: NEGATIVE
BLOOD, URINE: ABNORMAL
CHARACTER, URINE: CLEAR
COLOR: YELLOW
GLUCOSE URINE: NEGATIVE MG/DL
KETONES, URINE: NEGATIVE
LEUKOCYTE ESTERASE, URINE: NEGATIVE
NITRITE, URINE: NEGATIVE
PH UA: 7 (ref 5–9)
PREGNANCY, URINE: NEGATIVE
PROTEIN UA: ABNORMAL MG/DL
SPECIFIC GRAVITY UA: 1.02 (ref 1–1.03)
UROBILINOGEN, URINE: 1 EU/DL (ref 0.2–1)

## 2020-06-25 PROCEDURE — 84703 CHORIONIC GONADOTROPIN ASSAY: CPT

## 2020-06-25 PROCEDURE — 81003 URINALYSIS AUTO W/O SCOPE: CPT

## 2020-06-25 PROCEDURE — 87491 CHLMYD TRACH DNA AMP PROBE: CPT

## 2020-06-25 PROCEDURE — 99213 OFFICE O/P EST LOW 20 MIN: CPT

## 2020-06-25 PROCEDURE — 99213 OFFICE O/P EST LOW 20 MIN: CPT | Performed by: NURSE PRACTITIONER

## 2020-06-25 PROCEDURE — 87591 N.GONORRHOEAE DNA AMP PROB: CPT

## 2020-06-25 RX ORDER — PREDNISONE 20 MG/1
20 TABLET ORAL DAILY
Qty: 5 TABLET | Refills: 0 | Status: SHIPPED | OUTPATIENT
Start: 2020-06-25 | End: 2020-06-25 | Stop reason: CLARIF

## 2020-06-25 RX ORDER — FLUTICASONE PROPIONATE 50 MCG
1 SPRAY, SUSPENSION (ML) NASAL DAILY
Qty: 1 BOTTLE | Refills: 0 | Status: SHIPPED | OUTPATIENT
Start: 2020-06-25 | End: 2020-06-25 | Stop reason: CLARIF

## 2020-06-25 RX ORDER — PHENAZOPYRIDINE HYDROCHLORIDE 200 MG/1
200 TABLET, FILM COATED ORAL 3 TIMES DAILY PRN
Qty: 6 TABLET | Refills: 0 | Status: SHIPPED | OUTPATIENT
Start: 2020-06-25 | End: 2020-06-27

## 2020-06-25 ASSESSMENT — ENCOUNTER SYMPTOMS
SHORTNESS OF BREATH: 0
TROUBLE SWALLOWING: 0
NAUSEA: 0
EYE REDNESS: 0
EYE ITCHING: 0
EYE DISCHARGE: 0

## 2020-06-25 ASSESSMENT — PAIN SCALES - GENERAL: PAINLEVEL_OUTOF10: 7

## 2020-06-25 ASSESSMENT — PAIN DESCRIPTION - PAIN TYPE: TYPE: ACUTE PAIN

## 2020-06-25 NOTE — ED PROVIDER NOTES
[facility]  Urgent Care Encounter       CHIEF COMPLAINT       Chief Complaint   Patient presents with    Cystitis     Pt states has been having irregular periods and also bladder burning. Started 2 weeks ago. Nurses Notes reviewed and I agree except as noted in the HPI. HISTORY OF PRESENT ILLNESS   Ronan Fischer is a 30 y.o. female who presents to the urgent care center complaining of burning to the bladder was a regular menstrual cycle. Patient does have a history of interstitial cystitis. Patient states that she has had abnormal vaginal bleeding as well. Patient rates her pain 7 on a 10 scale. The patient states that her  is currently being treated for epididymitis. She denies any vaginal discharge. Patient denies any dyspareunia. The history is provided by the patient. No  was used. Dysuria    This is a new problem. The current episode started more than 1 week ago. The problem occurs every urination. The problem has not changed since onset. The quality of the pain is described as burning. The patient is experiencing no pain. She is sexually active. There is no history of pyelonephritis. Associated symptoms include hematuria. Pertinent negatives include no chills, no nausea, no frequency, no urgency and no flank pain. She has tried nothing for the symptoms. Her past medical history is significant for kidney stones and urological procedure. Her past medical history does not include recurrent UTIs. REVIEW OF SYSTEMS     Review of Systems   Constitutional: Negative for activity change, chills and fever. HENT: Negative for trouble swallowing. Eyes: Negative for discharge, redness and itching. Respiratory: Negative for shortness of breath. Gastrointestinal: Negative for nausea. Genitourinary: Positive for difficulty urinating, dysuria and hematuria.  Negative for flank pain, frequency, urgency, vaginal bleeding, vaginal mother; Depression in her mother; Heart Disease in her maternal grandmother; High Blood Pressure in her maternal grandmother. SOCIAL HISTORY     Patient  reports that she has never smoked. She has never used smokeless tobacco. She reports that she does not drink alcohol or use drugs. PHYSICAL EXAM     ED TRIAGE VITALS  BP: 132/83, Temp: 97.9 °F (36.6 °C), Pulse: 82, Resp: 16, SpO2: 99 %,Estimated body mass index is 26.31 kg/m² as calculated from the following:    Height as of 4/18/20: 5' 6\" (1.676 m). Weight as of this encounter: 163 lb (73.9 kg). ,Patient's last menstrual period was 06/11/2020. Physical Exam  Vitals signs and nursing note reviewed. Constitutional:       General: She is not in acute distress. Appearance: Normal appearance. She is well-developed. She is not ill-appearing, toxic-appearing or diaphoretic. HENT:      Head: Normocephalic. Right Ear: External ear normal.      Left Ear: External ear normal.      Nose: Nose normal.   Neck:      Musculoskeletal: Full passive range of motion without pain, normal range of motion and neck supple. Cardiovascular:      Rate and Rhythm: Normal rate. Pulmonary:      Effort: Pulmonary effort is normal.   Abdominal:      General: Bowel sounds are normal.      Palpations: Abdomen is soft. Skin:     General: Skin is warm and dry. Capillary Refill: Capillary refill takes less than 2 seconds. Neurological:      Mental Status: She is alert and oriented to person, place, and time. Psychiatric:         Behavior: Behavior is cooperative.          DIAGNOSTIC RESULTS     Labs:  Results for orders placed or performed during the hospital encounter of 06/25/20   Urinalysis   Result Value Ref Range    Glucose, Ur Negative NEGATIVE mg/dl    Bilirubin Urine Negative NEGATIVE    Ketones, Urine Negative NEGATIVE    Specific Gravity, UA 1.025 1.002 - 1.03    Blood, Urine Trace-lysed NEGATIVE    pH, UA 7.00 5.0 - 9.0    Protein, UA Trace (A) NEGATIVE mg/dl    Urobilinogen, Urine 1.00 0.2 - 1.0 eu/dl    Nitrite, Urine Negative NEGATIVE    Leukocyte Esterase, Urine Negative NEGATIVE    Color, UA Yellow STRAW-YELL    Character, Urine Clear CLEAR-SL C   Pregnancy, Urine   Result Value Ref Range    Pregnancy, Urine NEGATIVE NEGATIVE       IMAGING:    No orders to display         EKG:      URGENT CARE COURSE:     Vitals:    06/25/20 1832   BP: 132/83   Pulse: 82   Resp: 16   Temp: 97.9 °F (36.6 °C)   TempSrc: Temporal   SpO2: 99%   Weight: 163 lb (73.9 kg)       Medications - No data to display         PROCEDURES:  None    FINAL IMPRESSION      1. Interstitial cystitis          DISPOSITION/ PLAN        Patient or Patient designated representative was advised to drink plenty of water or fluids and take medication as prescribed. The patient or Patient designated representative is advised to monitor for any changes in pain, development of high fever, chills, persistent vomiting, development of increasing back or flank pain or increase in hematuria the patient is advised to go to the emergency department for reevaluation and further follow-up if they wouldn't notice any of the above symptoms. The patient or Patient designated representative was also advised to follow up with family doctor or primary care provider after the antibiotic is completed for repeat urinalysis. The patient did verbalize understanding of discharge instructions and is agreeable to the treatment plan. The patient left ambulatory without any changes or concerns in stable condition.       PATIENT REFERRED TO:  MD FELICITAS [place]/Jung Tao / [place] [address]      DISCHARGE MEDICATIONS:  New Prescriptions    PHENAZOPYRIDINE (PYRIDIUM) 200 MG TABLET    Take 1 tablet by mouth 3 times daily as needed for Pain       Discontinued Medications    ONDANSETRON (ZOFRAN-ODT) 4 MG DISINTEGRATING TABLET    Take 1 tablet by mouth 3 times daily as needed for Nausea or Vomiting       Current Discharge

## 2020-06-26 LAB
CHLAMYDIA TRACHOMATIS BY RT-PCR: NOT DETECTED
CT/NG SOURCE: NORMAL
NEISSERIA GONORRHOEAE BY RT-PCR: NOT DETECTED

## 2020-08-10 ENCOUNTER — OFFICE VISIT (OUTPATIENT)
Dept: ALLERGY | Age: 30
End: 2020-08-10
Payer: COMMERCIAL

## 2020-08-10 VITALS
RESPIRATION RATE: 14 BRPM | SYSTOLIC BLOOD PRESSURE: 110 MMHG | DIASTOLIC BLOOD PRESSURE: 68 MMHG | WEIGHT: 162 LBS | HEART RATE: 60 BPM | TEMPERATURE: 97.6 F | BODY MASS INDEX: 26.15 KG/M2

## 2020-08-10 PROCEDURE — 99205 OFFICE O/P NEW HI 60 MIN: CPT | Performed by: NURSE PRACTITIONER

## 2020-08-10 PROCEDURE — G8427 DOCREV CUR MEDS BY ELIG CLIN: HCPCS | Performed by: NURSE PRACTITIONER

## 2020-08-10 PROCEDURE — G8419 CALC BMI OUT NRM PARAM NOF/U: HCPCS | Performed by: NURSE PRACTITIONER

## 2020-08-10 PROCEDURE — 1036F TOBACCO NON-USER: CPT | Performed by: NURSE PRACTITIONER

## 2020-08-10 RX ORDER — CETIRIZINE HYDROCHLORIDE 10 MG/1
10 TABLET ORAL DAILY
Qty: 30 TABLET | Refills: 11 | Status: SHIPPED | OUTPATIENT
Start: 2020-08-10 | End: 2020-11-11

## 2020-08-10 RX ORDER — ALBUTEROL SULFATE 90 UG/1
2 AEROSOL, METERED RESPIRATORY (INHALATION) EVERY 6 HOURS PRN
Qty: 1 INHALER | Refills: 3 | Status: SHIPPED | OUTPATIENT
Start: 2020-08-10 | End: 2021-03-22 | Stop reason: SDUPTHER

## 2020-08-10 RX ORDER — BUDESONIDE AND FORMOTEROL FUMARATE DIHYDRATE 160; 4.5 UG/1; UG/1
2 AEROSOL RESPIRATORY (INHALATION) 2 TIMES DAILY
Qty: 1 INHALER | Refills: 3 | Status: SHIPPED | OUTPATIENT
Start: 2020-08-10 | End: 2021-01-13 | Stop reason: SDUPTHER

## 2020-08-10 ASSESSMENT — ENCOUNTER SYMPTOMS: EYE ITCHING: 1

## 2020-08-10 NOTE — PROGRESS NOTES
Allergy & Asthma   [address]  [facility] [place], [address]  [address] [facility]  Fax: [phone]          Chief Complaint:   Chief Complaint   Patient presents with    New Patient     Patient is here for allergies, hives. Has an allergies to fresh fruit, will get itchy throat and mouth       HISTORY OF PRESENT ILLNESS: NEW PATIENT TO PRACTICE   Complains of severe allergies. Cannot function without allergy medication. Patient states that she has had allergies all her life. She also reports that she has had problems with asthma. She states that she has to use her inhaler frequently that she has problems breathing. She complains of severe sinus headaches and sinus pressure related to her allergies. Patient reports she has itchy watery eyes and postnasal drainage. She does have carpet in her home as well as cats. She reports that this is been going on for several years. She cannot ever remember having a day where she did not have problems breathing. Severity is severe. Patient states that her she uses her rescue inhalers which does help improve her breathing. She states whenever there is a high pollen count her breathing becomes worse. Patient also has severe itching skin. She reports that she itches all over. She uses a steroid hair shampoo and has to bathe and it to help with her itching. She thinks she is allergic to water because she itches so bad. She does complain of urticaria and has had it for several years. She states she does break out in a rash on her face and sometimes all over. It spread to her trunk body arms and legs. Review of Systems:  Review of Systems   HENT: Positive for congestion and postnasal drip. Eyes: Positive for itching. Skin: Positive for rash. Allergic/Immunologic: Positive for environmental allergies and food allergies. All other systems reviewed and are negative.       Past Medical History:    Past Medical History:   Diagnosis Date    Anemia on iron supplements    Asthma     Albuterol and Advair    Bladder prolapse, female, acquired     Constipation     Headache(784.0)     Interstitial cystitis     Mental disorder     bipolar    Sickle cell anemia ([facility] [address]     Has trait       Past Surgical History:    Past Surgical History:   Procedure Laterality Date    ANKLE SURGERY      bilateral ankle revision    COLONOSCOPY  2013    CYSTOSCOPY  9/28/15    WITH HYDRODISTENTION    DILATION AND CURETTAGE OF UTERUS      for Missed AB    FOOT SURGERY Bilateral 06/22/2016    FOOT SURGERY Left 09/27/2017    osteotomy , gastrocnemius resectopm    FOOT SURGERY Right 03/28/2018    LAPAROSCOPY  7/1/13    OTHER SURGICAL HISTORY Left 02/14/2018    Removal of Screw from Calcaneus Left heel     NE FULL EXCIS 5TH METATARSAL HEAD Right 3/28/2018    MEDIAL CALCANEAL DISPLACEMENT OSTEOTOMY RIGHT FOOT, GASTROCNEMIUS LENGTHENING RIGHT LEG, COTTON OSTEOTOMY FIRST CUNEIFORM RIGHT FOOT WITH FORWEB RIGHT FOOT performed by Estephania Owens DPM at [address] Left 9/27/2017    MEDIAL CALCANEALSLIDE WITH SCREW FIXATION LEFT FOOT, COTTON OSTEOTOMY LEFT FOOT, GASTROC LENGTHENING LEFT LEG performed by Estephania Owens DPM at [address] [place] [address] IMPLANT Left 2/14/2018    REMOVAL OF SCREW DROM CALCANEUS LEFT HEEL performed by Estephania Owens DPM at [address] [place] [address]  2014       Family History:   Family History   Problem Relation Age of Onset    Asthma Mother     Depression Mother     Asthma Father     Heart Disease Maternal Grandmother     High Blood Pressure Maternal Grandmother        Social History:   Social History     Tobacco Use    Smoking status: Never Smoker    Smokeless tobacco: Never Used   Substance Use Topics    Alcohol use: No        Allergies:    No Known Allergies    Current Medications:   Prior to Visit Medications    Medication Sig Taking?  Authorizing Provider   budesonide-formoterol (SYMBICORT) 160-4.5 MCG/ACT AERO Inhale 2 puffs into the lungs 2 times daily Yes Larue Merlin, APRN - CNP   albuterol sulfate HFA (PROVENTIL HFA) 108 (90 Base) MCG/ACT inhaler Inhale 2 puffs into the lungs every 6 hours as needed for Wheezing Yes Larue Merlin, APRN - CNP   cetirizine (ZYRTEC ALLERGY) 10 MG tablet Take 1 tablet by mouth daily Yes Larue Merlin, APRN - CNP   acetaminophen (TYLENOL) 325 MG tablet Take 500 mg by mouth every 6 hours as needed for Pain  Yes Historical Provider, MD   ADVAIR -21 MCG/ACT inhaler Inhale 1 puff into the lungs 2 times daily. Yes Historical Provider, MD   albuterol (PROVENTIL HFA) 108 (90 BASE) MCG/ACT inhaler Inhale 2 puffs into the lungs every 4 hours as needed for Wheezing or Shortness of Breath (Space out to every 6 hours as symptoms improve). Space out to every 6 hours as symptoms improve. Yes Don Harris, DO   PRENATAL VIT-DSS-FE FUM-FA PO Take  by mouth. Yes Historical Provider, MD       Vitals:  Vitals:    08/10/20 1505   BP: 110/68   Pulse: 60   Resp: 14   Temp: 97.6 °F (36.4 °C)       162 lb (73.5 kg)           Physical Exam:  Physical Exam  Vitals signs and nursing note reviewed. Constitutional:       Appearance: Normal appearance. She is well-developed. HENT:      Head: Normocephalic. Right Ear: Tympanic membrane, ear canal and external ear normal.      Left Ear: Tympanic membrane, ear canal and external ear normal.      Nose: Nose normal.      Mouth/Throat:      Mouth: Mucous membranes are moist.      Pharynx: No oropharyngeal exudate. Eyes:      General: No scleral icterus. Right eye: No discharge. Left eye: No discharge. Extraocular Movements: Extraocular movements intact. Conjunctiva/sclera: Conjunctivae normal.      Pupils: Pupils are equal, round, and reactive to light. Neck:      Musculoskeletal: Normal range of motion and neck supple. Thyroid: No thyromegaly. Cardiovascular:      Rate and Rhythm: Normal rate and regular rhythm. Heart sounds: Normal heart sounds. Pulmonary:      Effort: Pulmonary effort is normal. No respiratory distress. Breath sounds: Normal breath sounds. No wheezing or rales. Abdominal:      Palpations: Abdomen is soft. Tenderness: There is no abdominal tenderness. Musculoskeletal: Normal range of motion. General: No tenderness. Skin:     General: Skin is warm and dry. Findings: Rash present. Comments: Rash noted on face mild and scattered. Neurological:      General: No focal deficit present. Mental Status: She is alert and oriented to person, place, and time. Mental status is at baseline. Deep Tendon Reflexes: Reflexes are normal and symmetric. Psychiatric:         Behavior: Behavior normal.         Thought Content: Thought content normal.         Judgment: Judgment normal.           DATA:  Lab Review:   Results for orders placed or performed during the hospital encounter of 06/25/20   C. Trachomatis / N. Gonorrhoeae, DNA    Specimen: Urethra   Result Value Ref Range    Chlamydia Trachomatis By RT-PCR NOT DETECTED     Neisseria Gonorrhoeae By RT-PCR NOT DETECTED     CT/NG SOURCE VAGINAL    Urinalysis   Result Value Ref Range    Glucose, Ur Negative NEGATIVE mg/dl    Bilirubin Urine Negative NEGATIVE    Ketones, Urine Negative NEGATIVE    Specific Gravity, UA 1.025 1.002 - 1.03    Blood, Urine Trace-lysed NEGATIVE    pH, UA 7.00 5.0 - 9.0    Protein, UA Trace (A) NEGATIVE mg/dl    Urobilinogen, Urine 1.00 0.2 - 1.0 eu/dl    Nitrite, Urine Negative NEGATIVE    Leukocyte Esterase, Urine Negative NEGATIVE    Color, UA Yellow STRAW-YELL    Character, Urine Clear CLEAR-SL C   Pregnancy, Urine   Result Value Ref Range    Pregnancy, Urine NEGATIVE NEGATIVE           PROCEDURES:  Pending skin testing    Assessment/Plan   Norma Willis was seen today for new patient.     Diagnoses and all orders for this been completed with a voice recognition program.  Efforts were made to edit the dictation but occasionally words are mis-transcribed.)    Patient to have sinus films  Patient have pulmonary function test    Patient have labs  Patient return to clinic to have allergy testing    Patient given refill on inhaler, Zyrtec and Symbicort  Signed:   ANDERSON Mina CNP  8/10/2020  3:56 PM

## 2020-08-27 ENCOUNTER — HOSPITAL ENCOUNTER (OUTPATIENT)
Age: 30
Discharge: HOME | End: 2020-08-27
Payer: COMMERCIAL

## 2020-08-27 ENCOUNTER — HOSPITAL ENCOUNTER (OUTPATIENT)
Dept: GENERAL RADIOLOGY | Age: 30
Discharge: HOME | End: 2020-08-27
Payer: COMMERCIAL

## 2020-08-27 LAB
IGA: 255 MG/DL (ref 70–400)
IGE: 792 IU/ML
IGG: 1335 MG/DL (ref 700–1600)
IGM: 181 MG/DL (ref 40–230)
REASON FOR REJECTION: NORMAL
REJECTED TEST: NORMAL

## 2020-08-27 PROCEDURE — 36415 COLL VENOUS BLD VENIPUNCTURE: CPT

## 2020-08-27 PROCEDURE — U0003 INFECTIOUS AGENT DETECTION BY NUCLEIC ACID (DNA OR RNA); SEVERE ACUTE RESPIRATORY SYNDROME CORONAVIRUS 2 (SARS-COV-2) (CORONAVIRUS DISEASE [COVID-19]), AMPLIFIED PROBE TECHNIQUE, MAKING USE OF HIGH THROUGHPUT TECHNOLOGIES AS DESCRIBED BY CMS-2020-01-R: HCPCS

## 2020-08-27 PROCEDURE — 86038 ANTINUCLEAR ANTIBODIES: CPT

## 2020-08-27 PROCEDURE — 82784 ASSAY IGA/IGD/IGG/IGM EACH: CPT

## 2020-08-27 PROCEDURE — 86003 ALLG SPEC IGE CRUDE XTRC EA: CPT

## 2020-08-27 PROCEDURE — 83516 IMMUNOASSAY NONANTIBODY: CPT

## 2020-08-27 PROCEDURE — 83088 ASSAY OF HISTAMINE: CPT

## 2020-08-27 PROCEDURE — 70220 X-RAY EXAM OF SINUSES: CPT

## 2020-08-27 PROCEDURE — 82785 ASSAY OF IGE: CPT

## 2020-08-29 LAB — SARS-COV-2: NOT DETECTED

## 2020-08-30 LAB
ANA SCREEN: NORMAL
CELIAC SEROLOGY: NORMAL

## 2020-08-31 LAB
MYELOPEROXIDASE AB, IGG: 1 AU/ML (ref 0–19)
SERINE PROTEASE 3, IGG: 5 AU/ML (ref 0–19)

## 2020-09-02 ENCOUNTER — HOSPITAL ENCOUNTER (OUTPATIENT)
Age: 30
Discharge: HOME | End: 2020-09-02
Payer: COMMERCIAL

## 2020-09-02 LAB
BASOPHILS # BLD: 0.5 %
BASOPHILS ABSOLUTE: 0 THOU/MM3 (ref 0–0.1)
EOSINOPHIL # BLD: 3.8 %
EOSINOPHILS ABSOLUTE: 0.2 THOU/MM3 (ref 0–0.4)
ERYTHROCYTE [DISTWIDTH] IN BLOOD BY AUTOMATED COUNT: 14.3 % (ref 11.5–14.5)
ERYTHROCYTE [DISTWIDTH] IN BLOOD BY AUTOMATED COUNT: 42.5 FL (ref 35–45)
HCT VFR BLD CALC: 40.9 % (ref 37–47)
HEMOGLOBIN: 12.3 GM/DL (ref 12–16)
HISTAMINE, WHOLE BLOOD: NORMAL
IMMATURE GRANS (ABS): 0.01 THOU/MM3 (ref 0–0.07)
IMMATURE GRANULOCYTES: 0.2 %
LYMPHOCYTES # BLD: 56.9 %
LYMPHOCYTES ABSOLUTE: 3.5 THOU/MM3 (ref 1–4.8)
MCH RBC QN AUTO: 24.9 PG (ref 26–33)
MCHC RBC AUTO-ENTMCNC: 30.1 GM/DL (ref 32.2–35.5)
MCV RBC AUTO: 82.8 FL (ref 81–99)
MONOCYTES # BLD: 7.3 %
MONOCYTES ABSOLUTE: 0.4 THOU/MM3 (ref 0.4–1.3)
NUCLEATED RED BLOOD CELLS: 0 /100 WBC
PLATELET # BLD: 314 THOU/MM3 (ref 130–400)
PMV BLD AUTO: 11.7 FL (ref 9.4–12.4)
RBC # BLD: 4.94 MILL/MM3 (ref 4.2–5.4)
SEG NEUTROPHILS: 31.3 %
SEGMENTED NEUTROPHILS ABSOLUTE COUNT: 1.9 THOU/MM3 (ref 1.8–7.7)
WBC # BLD: 6.1 THOU/MM3 (ref 4.8–10.8)

## 2020-09-02 PROCEDURE — 36415 COLL VENOUS BLD VENIPUNCTURE: CPT

## 2020-09-02 PROCEDURE — 85025 COMPLETE CBC W/AUTO DIFF WBC: CPT

## 2020-09-03 ENCOUNTER — HOSPITAL ENCOUNTER (OUTPATIENT)
Dept: PULMONOLOGY | Age: 30
Discharge: HOME | End: 2020-09-03
Payer: COMMERCIAL

## 2020-09-03 LAB
ALLERGEN BERMUDA GRASS IGE: 1.4 KU/L
ALLERGEN BIRCH IGE: 0.16 KU/L
ALLERGEN BOX ELDER: 0.22 KU/L
ALLERGEN CAT DANDER IGE: > 100 KU/L
ALLERGEN COMMON SHORT RAGWEED IGE: 12.3 KU/L
ALLERGEN COTTONWOOD: 0.67 KU/L
ALLERGEN D. FARINAE (DUST MITE): 2.49 KU/L
ALLERGEN DOG DANDER IGE: 29.1 KU/L
ALLERGEN ELM IGE: 0.57 KU/L
ALLERGEN FUNGI/MOLD A. ALTERNATA IGE: 6.64 KU/L
ALLERGEN FUNGI/MOLD A. FUMIGATUS IGE: 2.54 KU/L
ALLERGEN FUNGI/MOLD M.RACEMOSUS IGE: 0.4 KU/L
ALLERGEN FUNGI/MOLD P. NOTATUM IGE: 0.38 KU/L
ALLERGEN GERMAN COCKROACH IGE: 0.37 KU/L
ALLERGEN INTERPRETATION/SCORE: ABNORMAL
ALLERGEN MITE DUST PTERONYSSINUS IGE: 0.58 KU/L
ALLERGEN MOUNTAIN CEDAR: 0.39 KU/L
ALLERGEN MOUSE EPITHELIA IGE: 5.68 KU/L
ALLERGEN PECAN TREE IGE: 0.32 KU/L
ALLERGEN RUSSIAN THISTLE IGE: 0.62 KU/L
ALLERGEN SHEEP SORREL (W18) IGE: 0.17 KU/L
ALLERGEN TIMOTHY GRASS: 14.6 KU/L
ALLERGEN TREE SYCAMORE: 0.18 KU/L
ALLERGEN WALNUT TREE IGE: 0.71 KU/L
ALLERGEN WEED, PIGWEED IGE: 0.18 KU/L
ALLERGEN WHITE ASH: 0.72 KU/L
ALLERGEN WHITE MULBERRY TREE, IGE: 0.11 KU/L
ALLERGEN, FUNGI/MOLD: 1.7 KU/L
ALLERGEN, TREE, OAK: 0.13 KU/L
IMMUNOGLOBULIN E: 825 KU/L

## 2020-09-03 PROCEDURE — 94726 PLETHYSMOGRAPHY LUNG VOLUMES: CPT

## 2020-09-03 PROCEDURE — 94729 DIFFUSING CAPACITY: CPT

## 2020-09-03 PROCEDURE — 94060 EVALUATION OF WHEEZING: CPT

## 2020-09-03 NOTE — PROCEDURES
Patient Name: Kristine Cee  YOB: 1960          MRN number:  [id number]  Date:  12/7/2017  Referring Physician:  Mariela Law     Subjective:   I feel less stressed today. Feeling a little more tingling into the L jaw today.  Minimal stiffness Prescreening performed prior to testing. The following symptons may indicate COVID-19 infection:        One of the following criteria:   Temperature taken, patient temperature was 98.1 F. Fever greater 100.0 F -no  Cough -  no  New onset shortness of breath -no  New onset difficulty breathing -no        And/or   Two or more of the following criteria:  Muscle aches -no  Headache -no  Sore throat -no  New onset loss of smell/taste -no  New onset diarrhea -no    Patient's screening was negative. PFT will be performed. · Pt will be independent and compliant with comprehensive HEP to maintain progress achieved in PT (10 visits)- improved    Plan:   Cont with PT for thoracic and cervical ROM and strength. Improve posture.  Pt has made gains with less radicular pain in the L Foam and sheets for thoracic extension  5 mins   foam  1/2 roller   5 mins   Rows with red band  Retractions and Post PT   Rows red band  10 x    Lateral side bend Wall push ups  10 x 2     cervical retractions with 10 sec holds  Ball at head 10 reps 10 x

## 2020-09-05 LAB — MISC REFERENCE: NORMAL

## 2020-09-30 ENCOUNTER — PROCEDURE VISIT (OUTPATIENT)
Dept: ALLERGY | Age: 30
End: 2020-09-30
Payer: COMMERCIAL

## 2020-09-30 VITALS
RESPIRATION RATE: 14 BRPM | TEMPERATURE: 97.8 F | DIASTOLIC BLOOD PRESSURE: 62 MMHG | BODY MASS INDEX: 25.5 KG/M2 | HEART RATE: 60 BPM | WEIGHT: 158 LBS | SYSTOLIC BLOOD PRESSURE: 108 MMHG

## 2020-09-30 PROCEDURE — 99214 OFFICE O/P EST MOD 30 MIN: CPT | Performed by: NURSE PRACTITIONER

## 2020-09-30 PROCEDURE — 1036F TOBACCO NON-USER: CPT | Performed by: NURSE PRACTITIONER

## 2020-09-30 PROCEDURE — 95165 ANTIGEN THERAPY SERVICES: CPT | Performed by: NURSE PRACTITIONER

## 2020-09-30 PROCEDURE — G8419 CALC BMI OUT NRM PARAM NOF/U: HCPCS | Performed by: NURSE PRACTITIONER

## 2020-09-30 PROCEDURE — 95004 PERQ TESTS W/ALRGNC XTRCS: CPT | Performed by: NURSE PRACTITIONER

## 2020-09-30 PROCEDURE — G8427 DOCREV CUR MEDS BY ELIG CLIN: HCPCS | Performed by: NURSE PRACTITIONER

## 2020-09-30 RX ORDER — MONTELUKAST SODIUM 10 MG/1
10 TABLET ORAL NIGHTLY
Qty: 30 TABLET | Refills: 5 | Status: SHIPPED | OUTPATIENT
Start: 2020-09-30 | End: 2021-03-22 | Stop reason: SDUPTHER

## 2020-09-30 RX ORDER — EPINEPHRINE 0.3 MG/.3ML
0.3 INJECTION SUBCUTANEOUS ONCE
Qty: 0.3 ML | Refills: 1 | Status: SHIPPED | OUTPATIENT
Start: 2020-09-30 | End: 2021-09-22 | Stop reason: SDUPTHER

## 2020-09-30 ASSESSMENT — ENCOUNTER SYMPTOMS
WHEEZING: 0
GASTROINTESTINAL NEGATIVE: 1
SINUS PRESSURE: 1
EYE ITCHING: 1
STRIDOR: 0
CHEST TIGHTNESS: 0
RHINORRHEA: 1
PHOTOPHOBIA: 0
COUGH: 1
EYE REDNESS: 0
SINUS PAIN: 1
SHORTNESS OF BREATH: 1

## 2020-09-30 NOTE — PATIENT INSTRUCTIONS
Allergy Avoidance Measures  1. Shower and wash your hair before going to bed during pollen season. 2. Don't dry clothes and bedding outside where pollen and molds will stick to them. 3. Wash your hands after petting an animal.  4. Run a night light continuously in dark closets and basements to help reduce molds. 5. Place stuffed animal in the freezer for a day to kill dust mites. 6. Be aware that during the mid morning and early evening, pollen levels are the highest of the day. 7. Undress outside your bedroom, leaving allergens from other places away from where you sleep. 8. Remove and wash your clothing immediately after visiting friends with pets. 9. Seal your pillows and mattresses in allergy-proof coverings so dust mites can't get to your nose and eyes. 10. Use a dehumidifier to reduce molds, especially in damp, humid places like  basements, maintaining a humidity level between 30%-50%. 11. Remove carpeting in your home if allergic to pets. Hardwood, tile, and linoleum are easier to keep dander free. 12. If you are allergic to bees, wasps, or yellow jackets, avoid bright colored clothing, scented hairspray, deodorant or perfume, and avoid picnics and BBQ'S. 13. Dust mites and dander will accumulate in upholstered furniture. 14. To thoroughly clean, dust with a damp rag or mop rather than dry dusting/sweeping. 15. Use a vacuum  with a HEPA filter and clean/change the filter when needed. 16. Wear a dust mask while vacuuming to avoid inhaling stirred-up-dust.  17. Leave a room that was just dusted or vacuumed for at least 20 minutes to allow airborne dust to settle. 18. If you live with a pet, cover the air ducts to your bedroom if you have forced-air heating/cooling. 19. Thoroughly clean moldy areas with a 1 to 10 part diluted mixture of chlorine bleach   and water. 21. Do not allow smoking in your home.   21. Have a non-allergic person clean the cages of small animals like mice and gerbils to avoid contact with allergens in their urine. 22. Keep windows in your car closed and use air conditioner. 23. Avoid using window fans which draw pollens and molds into the house. 24. Air out and clean vacation homes if closed up all winter and susceptible to mold growth. 25. Inspect and remove major sources of mold growth such as humidifiers, wet carpeting, rotting magaly, garbage containers, and water damaged wallpaper. 26. When vacuuming, use double-thick disposable vacuum bags or a high effciency HEPA filter sweeper. 32. Plan your vacations during high pollen season, but be sure to choose a place that has low pollen counts. 28. If you suspect certain foods are causing reactions, consult with your doctor before making radical changes to your diet. 29. Read the labels on all the foods you buy to detect hidden allergies like milk, eggs, and nuts. 27. Wear a medical alert type necklace or bracelet if you have serious allergies or asthma. 32. Wash thoroughly with soap and water, within 2 hours of contact, skin, clothing pets and other objects that has come in contact with poison oak, ivy or sumac. 32. When using insecticides have a non-allergic person spray while you are out of the home. 33. Reduce indoor molds resulting from high humidity by cleaning bathrooms, mamadou and basements regularly. 34. Avoid contact with irritating fumes generated by wood-burning stoves and kerosene    heaters. 28. Check out your child's school for allergy triggers such as animals in the classroom and damp moldy areas. 39. Run your stove fan while cooking to lower humidity and remove fumes and smells. 40. Wash all bedding in 130 degree water every week to kill dust mites. Hot drying is not  enough. (If you set your hot water heater to a lower temperature to prevent children from scalding themselves, wash items at a commercial laundromat that uses high wash temperatures.)  38.  Before taking a lengthy auto trip, have your car's air conditioner unit thoroughly cleaned of mildew and mold. 39. When exercising outdoors, only do so on wind-free days. 40. Install and run a vent fan in the bathroom while showering or bathing. 41. Avoid irritants like tobacco smoke, aerosols, perfumes, and cleaning products  with strong odors. 43. Leave your home while it is being painted and be sure to use latex rather than Oil-based paints. 43. Before relocating try to visit the new location for 2-4 weeks to see if your symptoms   improve. Remember that it may take months or years to develop allergic symptoms       to a new allergen. 44. Replace down, feather, and foam pillow with fiberfill products. 39. Prune trees and brushes regularly to avoid heavy vegetation around the house. 55. Keep your pets out of the bedroom so you are not exposed to animal allergens while you sleep. 52. Talk to your provider about your symptoms, because you don't have to suffer. Your provider can prescribe effective treatments for controlling allergic symptoms when you  can't escape the allergens that surround you. Patient Education        Allergy Shots: Care Instructions  Your Care Instructions     When you get an allergy shot, your allergist or doctor injects small doses of substances that you are allergic to (allergens) under your skin. This helps your body \"get used to\" the allergen, which can reduce or prevent symptoms. At first, you may need to get allergy shots once a week and then once a month. It may take up to a full year of shots before you see any change in your symptoms. The allergy shot may cause mild problems, such as soreness, redness, warmth, or swelling on the arm where you got the shot. It may also cause itching, hives, or a rash that spreads to other parts of your body. Follow-up care is a key part of your treatment and safety. Be sure to make and go to all appointments, and call your doctor if you are having problems.  It's also a good idea to know your test results and keep a list of the medicines you take. How can you care for yourself at home? · Do not smoke or allow others to smoke around you. Smoking makes allergies worse. If you need help quitting, talk to your doctor about stop-smoking programs and medicines. These can increase your chances of quitting for good. · If there is a lot of pollution, pollen, or dust outside, stay inside and keep the windows closed. Use an air conditioner when it's hot outside, and use an air filter in your home. · If dust or dust mites trigger your asthma, decrease the dust around your bed:  ? Wash sheets, pillowcases, and other bedding in hot water every week. ? Use dust-proof covers for pillows, duvets, and mattresses. Avoid plastic covers, because they tear easily and do not \"breathe. \" Wash as instructed on the label. ? Do not use any blankets and pillows that you do not need. ? Use blankets that you can wash in your washing machine. ? Consider removing drapes and carpets, which attract and hold dust, from your bedroom. · If mold triggers your allergies, get rid of furniture, rugs, and drapes that smell musty. Check for mold under sinks and in the bathroom, attic, and basement. Use a dehumidifier to control mold in these areas. · If pet dander triggers your allergies, keep pets outside or out of your bedroom. Old carpet and cloth furniture can hold a lot of animal dander. You may need to replace them. · If your allergies are triggered by cold air, wear a scarf around your face, and breathe through your nose. · Avoid colds and flu. Get a pneumococcal vaccine shot. If you have had one before, ask your doctor whether you need another dose. Get a flu vaccine every year. If you must be around people with colds or the flu, wash your hands often. When should you call for help? Give an epinephrine shot if:  · You think you are having a severe allergic reaction.   After giving an epinephrine shot call 911, even if you feel better. [id number] if:  · You have symptoms of a severe allergic reaction. These may include:  ? Sudden raised, red areas (hives) all over your body. ? Swelling of the throat, mouth, lips, or tongue. ? Trouble breathing. ? Passing out (losing consciousness). Or you may feel very lightheaded or suddenly feel weak, confused, or restless. · You have been given an epinephrine shot, even if you feel better. Call your doctor now or seek immediate medical care if:  · You have symptoms of an allergic reaction, such as:  ? A rash or hives (raised, red areas on the skin). ? Itching. ? Swelling. ? Belly pain, nausea, or vomiting. Watch closely for changes in your health, and be sure to contact your doctor if:  · You do not get better as expected. Where can you learn more? Go to [email or web address] org and sign in to your [employer] account. Enter K112 in the [employer] box to learn more about \"Allergy Shots: Care Instructions. \"     If you do not have an account, please click on the \"Sign Up Now\" link. Current as of: October 7, 2019               Content Version: 12.5  © 0156-5584 [employer]. Care instructions adapted under license by [facility] ([facility]). If you have questions about a medical condition or this instruction, always ask your healthcare professional. [place] [address] any warranty or liability for your use of this information. Patient Education        epinephrine injection  Pronunciation:  EP i AJ rin  Brand:  Adrenalin, Auvi-Q, Epinephrinesnap-EMS, Epinephrinesnap-V, EpiPen 2-Jairon, EpiPen JR 2-Jairon, EPIsnap, Symjepi  What is the most important information I should know about epinephrine injection? Seek emergency medical attention after any use of epinephrine to treat a severe allergic reaction. After the injection you will need to receive further treatment and observation. What is epinephrine injection?   Epinephrine is a chemical that narrows blood vessels and opens airways in the lungs. These effects can reverse severe low blood pressure, wheezing, severe skin itching, hives, and other symptoms of an allergic reaction. Epinephrine injection is used to treat severe allergic reactions (anaphylaxis) to insect stings or bites, foods, drugs, and other allergens. Epinephrine auto-injectors may be kept on hand for self-injection by a person with a history of severe allergic reaction. Epinephrine is also used to treat exercise-induced anaphylaxis, or to treat low blood pressure that is caused by septic shock. Epinephrine injection may also be used for purposes not listed in this medication guide. What should I discuss with my healthcare provider before using epinephrine injection? Before using epinephrine, tell your doctor if any past use of this medicine caused an allergic reaction to get worse. Tell your doctor if you have ever had:  · heart disease or high blood pressure;  · asthma;  · Parkinson's disease;  · depression or mental illness;  · a thyroid disorder; or  · diabetes. Having an allergic reaction while pregnant or nursing could harm both mother and baby. You may need to use epinephrine during pregnancy or while you are breast-feeding. Seek emergency medical attention right away after using the injection. In an emergency, you may not be able to tell caregivers if you are pregnant or breast feeding. Make sure any doctor caring for your pregnancy or your baby knows you received this medicine. How should I use epinephrine injection? Epinephrine is injected into the skin or muscle of your outer thigh. In an emergency, this injection can be given through your clothing. Epinephrine is sometimes given as an infusion into a vein. A healthcare provider will give you this type of injection. The auto-injector device is a disposable single-use system.  Follow all directions on your prescription label and read all medication for non-emergency use to practice giving yourself an epinephrine injection. Store at room temperature away from moisture, heat, and light. Do not refrigerate or freeze this medication, and do not store it in a car. What happens if I miss a dose? Since epinephrine is used when needed, it does not have a daily dosing schedule. What happens if I overdose? Seek emergency medical attention or call the Poison Help line at 1-616.118.4692. Symptoms of an epinephrine overdose may include numbness or weakness, severe headache, blurred vision, pounding in your neck or ears, sweating, chills, chest pain, fast or slow heartbeats, severe shortness of breath, or cough with foamy mucus. What should I avoid while using epinephrine injection? Do not inject epinephrine into a vein or into the muscles of your buttocks, or it may not work as well. Inject it only into the fleshy outer portion of the thigh. Accidentally injecting epinephrine into your hands or feet may result in a loss of blood flow to those areas, and resulting numbness. What are the possible side effects of epinephrine injection? Before using epinephrine, tell your doctor if any past use of this medicine caused an allergic reaction to get worse. Call your doctor at once if you notice pain, swelling, warmth, redness, or other signs of infection around the area where you gave an injection. Common side effects may include:  · breathing problems;  · fast or pounding heartbeats;  · pale skin, sweating;  · nausea and vomiting;  · dizziness;  · weakness or tremors;  · throbbing headache; or  · feeling nervous, anxious, or fearful. This is not a complete list of side effects and others may occur. Call your doctor for medical advice about side effects. You may report side effects to FDA at 0-405-EJU-5608. What other drugs will affect epinephrine injection?   Other drugs may affect epinephrine, including prescription and over-the-counter medicines, vitamins, and herbal products. Tell your doctor about all your current medicines and any medicine you start or stop using. Where can I get more information? Your doctor or pharmacist can provide more information about epinephrine injection. Remember, keep this and all other medicines out of the reach of children, never share your medicines with others, and use this medication only for the indication prescribed. Every effort has been made to ensure that the information provided by [address] is accurate, up-to-date, and complete, but no guarantee is made to that effect. Drug information contained herein may be time sensitive. [facility] information has been compiled for use by healthcare practitioners and consumers in the [place] and therefore [facility] does not warrant that uses outside of the [place] are appropriate, unless specifically indicated otherwise. [facility]'s drug information does not endorse drugs, diagnose patients or recommend therapy. [facility]'s drug information is an informational resource designed to assist licensed healthcare practitioners in caring for their patients and/or to serve consumers viewing this service as a supplement to, and not a substitute for, the expertise, skill, knowledge and judgment of healthcare practitioners. The absence of a warning for a given drug or drug combination in no way should be construed to indicate that the drug or drug combination is safe, effective or appropriate for any given patient. [facility] does not assume any responsibility for any aspect of healthcare administered with the aid of information [facility] provides. The information contained herein is not intended to cover all possible uses, directions, precautions, warnings, drug interactions, allergic reactions, or adverse effects. If you have questions about the drugs you are taking, check with your doctor, nurse or pharmacist.  Copyright 2170-0057 [address]. Version: 12.03.  Revision date: 2/14/2020. Care instructions adapted under license by [facility] ([facility]). If you have questions about a medical condition or this instruction, always ask your healthcare professional. [address] any warranty or liability for your use of this information.

## 2020-09-30 NOTE — PROGRESS NOTES
@[email or web address]@    Allergy & Asthma   [address] [facility], [address]  Ph:   [phone]  Fax:[phone]    Provider:  Dr. Remigio Beal:   Chief Complaint   Patient presents with    Immunotherapy     Patient is here for allergy testing           HISTORY OF PRESENT ILLNESS: ESTABLISHED PATIENT HERE FOR EVALUATION    Complains of severe allergies. Cannot function without allergy medication. Patient states that she has had allergies all her life. She also reports that she has had problems with asthma. She states that she has to use her inhaler frequently that she has problems breathing. She complains of severe sinus headaches and sinus pressure related to her allergies. Patient reports she has itchy watery eyes and postnasal drainage. She does have carpet in her home as well as cats. She reports that this is been going on for several years. She cannot ever remember having a day where she did not have problems breathing. Severity is severe. Patient states that her she uses her rescue inhalers which does help improve her breathing. She states whenever there is a high pollen count her breathing becomes worse. Patient also has severe itching skin. She reports that she itches all over. She uses a steroid hair shampoo and has to bathe and it to help with her itching. She thinks she is allergic to water because she itches so bad. She does complain of urticaria and has had it for several years. She states she does break out in a rash on her face and sometimes all over. It spread to her trunk body arms and legs. Patient states that she has been off antihistamines for the last week and she reports that her symptoms have gotten worse. She would like to proceed with allergy testing today and if possible go back on her antihistamines. She denies any complications of breathing today. She denies any nausea vomiting chest pain or fever.         Review of Systems:  Review of Systems   Constitutional: Positive for activity change. Negative for chills and fatigue. HENT: Positive for congestion, ear pain, hearing loss, postnasal drip, rhinorrhea, sinus pressure, sinus pain and sneezing. Negative for nosebleeds. Eyes: Positive for itching. Negative for photophobia and redness. Respiratory: Positive for cough and shortness of breath. Negative for chest tightness, wheezing and stridor. Cardiovascular: Negative. Gastrointestinal: Negative. Endocrine: Negative. Genitourinary: Negative. Musculoskeletal: Negative for myalgias, neck pain and neck stiffness. Skin: Negative for pallor and rash. Allergic/Immunologic: Positive for environmental allergies, food allergies and immunocompromised state. Neurological: Positive for light-headedness. Negative for seizures, numbness and headaches. Hematological: Negative for adenopathy. Does not bruise/bleed easily. Psychiatric/Behavioral: Negative. All other systems reviewed and are negative.         Past MedicalHistory:    Past Medical History:   Diagnosis Date    Anemia     on iron supplements    Asthma     Albuterol and Advair    Bladder prolapse, female, acquired     Constipation     Headache(784.0)     Interstitial cystitis     Mental disorder     bipolar    Sickle cell anemia ([facility] [address]     Has trait       Past Surgical History:  Past Surgical History:   Procedure Laterality Date    ANKLE SURGERY      bilateral ankle revision    COLONOSCOPY  2013    CYSTOSCOPY  9/28/15    WITH HYDRODISTENTION    DILATION AND CURETTAGE OF UTERUS      for Missed AB    FOOT SURGERY Bilateral 06/22/2016    FOOT SURGERY Left 09/27/2017    osteotomy , gastrocnemius resectopm    FOOT SURGERY Right 03/28/2018    LAPAROSCOPY  7/1/13    OTHER SURGICAL HISTORY Left 02/14/2018    Removal of Screw from Calcaneus Left heel     [place] FULL EXCIS 5TH METATARSAL HEAD Right 3/28/2018    MEDIAL CALCANEAL DISPLACEMENT OSTEOTOMY RIGHT FOOT, GASTROCNEMIUS LENGTHENING RIGHT LEG, COTTON OSTEOTOMY FIRST CUNEIFORM RIGHT FOOT WITH FORWEB RIGHT FOOT performed by Noah Guerrero DPM at [address] Left 9/27/2017    MEDIAL CALCANEALSLIDE WITH SCREW FIXATION LEFT FOOT, COTTON OSTEOTOMY LEFT FOOT, GASTROC LENGTHENING LEFT LEG performed by Noah Guerrero DPM at [address] [facility] IMPLANT Left 2/14/2018    REMOVAL OF SCREW DROM CALCANEUS LEFT HEEL performed by Noah Guerrero DPM at [address] [place] [address] EXTRACTION  2014       Family History:   Family History   Problem Relation Age of Onset    Asthma Mother     Depression Mother     Asthma Father     Heart Disease Maternal Grandmother     High Blood Pressure Maternal Grandmother        Social History:   Social History     Tobacco Use    Smoking status: Never Smoker    Smokeless tobacco: Never Used   Substance Use Topics    Alcohol use: No        Allergies:  Patient has no known allergies.     CurrentMedications:     Current Outpatient Medications:     montelukast (SINGULAIR) 10 MG tablet, Take 1 tablet by mouth nightly, Disp: 30 tablet, Rfl: 5    EPINEPHrine (EPIPEN 2-NATHAN) 0.3 MG/0.3ML SOAJ injection, Inject 0.3 mLs into the muscle once for 1 dose Use as directed for allergic reaction, Disp: 0.3 mL, Rfl: 1    budesonide-formoterol (SYMBICORT) 160-4.5 MCG/ACT AERO, Inhale 2 puffs into the lungs 2 times daily, Disp: 1 Inhaler, Rfl: 3    albuterol sulfate HFA (PROVENTIL HFA) 108 (90 Base) MCG/ACT inhaler, Inhale 2 puffs into the lungs every 6 hours as needed for Wheezing, Disp: 1 Inhaler, Rfl: 3    cetirizine (ZYRTEC ALLERGY) 10 MG tablet, Take 1 tablet by mouth daily, Disp: 30 tablet, Rfl: 11    acetaminophen (TYLENOL) 325 MG tablet, Take 500 mg by mouth every 6 hours as needed for Pain , Disp: , Rfl:     PRENATAL VIT-DSS-FE FUM-FA PO, Take  by mouth.  , Disp: , Rfl:       Physical Exam:      Vitals:    Vitals:    09/30/20 0941   BP: 108/62   Pulse: 60   Resp: 14   Temp: 97.8 °F (36.6 °C)       158 lb (71.7 kg)       Temp: 97.8 °F (36.6 °C) I @FLOWSTAT(6)@ IPulse: 60 I @FLOWSTAT(8)@ I BP: 108/62 I @SIKUBR(60)@; @GZEPFI(49)@ I Resp: 14 I @FLOWSTAT(9)@ I   I @FLOWSTAT(10)@ I   I   I   I Facility age limit for growth percentiles is 20 years. I     Facility age limit for growth percentiles is 20 years. Facility age limit for growth percentiles is 20 years. Facility age limit for growth percentiles is 20 years. Facility age limit for growth percentiles is 20 years. Physical Exam:    Physical Exam  Vitals signs and nursing note reviewed. Constitutional:       Appearance: Normal appearance. She is well-developed and normal weight. HENT:      Head: Normocephalic and atraumatic. Right Ear: Tympanic membrane, ear canal and external ear normal.      Left Ear: Tympanic membrane, ear canal and external ear normal.      Nose: Congestion and rhinorrhea present. Mouth/Throat:      Mouth: Mucous membranes are moist.      Pharynx: No oropharyngeal exudate. Eyes:      General: No scleral icterus. Right eye: No discharge. Left eye: No discharge. Extraocular Movements: Extraocular movements intact. Conjunctiva/sclera: Conjunctivae normal.      Pupils: Pupils are equal, round, and reactive to light. Neck:      Musculoskeletal: Normal range of motion and neck supple. Thyroid: No thyromegaly. Cardiovascular:      Rate and Rhythm: Normal rate and regular rhythm. Pulses: Normal pulses. Heart sounds: Normal heart sounds. Pulmonary:      Effort: Pulmonary effort is normal. No respiratory distress. Breath sounds: Normal breath sounds. No wheezing or rales. Abdominal:      Palpations: Abdomen is soft. Tenderness: There is no abdominal tenderness. Musculoskeletal: Normal range of motion. General: No tenderness.    Skin:     General: )    CLINICAL INFORMATION: Chronic pansinusitis. Headaches. Pressure above the eyes. COMPARISON: No prior study. TECHNIQUE: Jessica Kilo, lateral, and submental vertex views of the paranasal sinuses were obtained. FINDINGS: The frontal sinuses are hypoplastic. All of the other paranasal sinuses are well-developed and well aerated. No mucosal thickening or air-fluid levels are seen. There appears be moderate hypertrophy of the inferior nasal turbinate right side. Cannot exclude rhinitis. Sella turcica is unremarkable. Impression 1. Unremarkable paranasal sinuses. 2. Questionable rhinitis. **This report has been created using voice recognition software. It may contain minor errors which are inherent in voice recognition technology. **    Final report electronically signed by Dr. Gail Moscoso on 8/27/2020 1:09 PM      No results found for this or any previous visit. PROCEDURES:      Skin Testing performed on: 9/30/2020        Last use of antihistamine (or other medication affecting response to histamine): greater than one week    Patient informed of risks of skin allergy testing mild, moderate, and severe including potential for anaphylaxis. Patient wishes to proceed. Consent signed: Yes    Location: Back  Testing preformed: Intradermal    Panel A Epictuaneous   Panel A  Epictuaneous    Site Allergen  W (mm) F  Site Allergen  W (mm) F   A1 Histamine positive control 8 12  A6 Cockroach Mix 4.3 5   A2 Glycerin negative control 0 6  A7 Feather Mix 5.8 7.6   A3 Dust mite Mix 6.8 10  A8 Mouse epithelia 9 11   A4 Cat Hair 7.1 12  A9 Cattle Epithelia 4.7 6   A5 Dog Ep.  7 8  A10 Horse Epithelia 7.4 8              Panel B Epictuaneous   Panel B  Epictuaneous    Site Allergen  W (mm) F  Site Allergen  W (mm) F   B11 [place] Red/[place] 7.5 9  B16 Hackleberry Tree 4.2 6.4   B12 [place], [place] Tree 6 8.4  B17 MOHINI, [place]ark Tree 7.7 9   B13 Birch mix tree 0 3  B18 [place], red Tree 4.8 6.8   B14 [place] mix, [facility] Tree 6.1 7.6  B19 [place], white/eastern Tree 5.2 7.3   B15 Elm mix tree 6 8.9  B20 [place], eastern Tree 6.2 8              Panel C Epictuaneous   Panel C  Epictuaneous    Site Allergen  W (mm) F  Site Allergen  W (mm) F   C21 [place], Black Tree 5.1 8  C26 Maple 6.4 8   C22 [place], Black Tree 0 3  C27 Maurisio Grass 8.5 10   C23 [place] 0 3  C28 [place] 4.8 7   C24 [place] Mtn.  4.5 7  C29 7 grass mix 16.6 20   C25 Pecan 6.4 7.8  C30 Carlene Grass 17 20              Panel D Epictuaneous   Panel D  Epictuaneous    Site Allergen  W (mm) F  Site Allergen  W (mm) F   D31 Corn Cult.  4 7.6  D36 Lamb[place] weed 4.7 6.5   D32 Cocklebur [place] 8.9 12  D37 Nettle weed 0 3   D33 Kochia/[place]bush weed 6.3 10  D38 Pigweed Rough, Redroot weed 7 9.5   D34 Dock-Sorrel Mix weed 0 3  D39 Plantain, English weed 9 12   D35 Ragweed mix 17.5 25  D40 [place], [place] weed 5.8 7              Panel E Epictuaneous   Panel E  Epictuaneous    Site Allergen  W (mm) F  Site Allergen  W (mm) F   E41 Russian Thistle 5.8 7.8  E46 Aureobasidium pllulans mold 0 3   E42 Mugwort Common 4.7 6.9  E47 Bipolaris/Helminthosporium .  Mold 6.5 8.5   E43 Sheep [place], Red 0 3  E48 Cladosporium herb, mold 4.8 6.5   E44 Acremoniumstrictum, Mold 0 3  E49 Cladosporium sphaero mold 5.2 6.5   E45 Aspergillus fumiga, Mold 4.5 5  E50 Drechsleraspicifera, mold 7.8 9.1              Panel F Epictuaneous   Panel F  Epictuaneous    Site Allergen  W (mm) F  Site Allergen  W (mm) F   F51 Penicillium chrysog Mold 0 3  F56 GS Fish mix, food 0 3   F52 Alternaria Alternata 12.3 15  F57 GS Shellfish Mix Food 5.6 7   F53 [place] Food 6.8 7  F58 Milk, Cow Food 6.7 8.5   F54 Corn Food 0 3  F59 Peanut Food 0 3   F55 Egg, Whole Chicken food 0 3  F60 Sesame Seed Food 0 3               Panel G Epicutaneous    Panel G Epicutaneous    Site Allergen W (mm) F  Site Allergen W (mm) F   G61 Soybean Food 0 3        G62 Wheat, Whole Food 0 3            62 Panel delay reaching maintenance dosage. A six-month follow-up visit with the provider is required in order to assess progress; yearly visits are then required. Please inform our office if you feel that the injections are not working. Let us know as soon as possible if you become pregnant. We will not build up your doses until after your baby is born. As with any long-term treatment or therapy, yearly evaluations and assessments by your provider are necessary to optimize and safely monitor your response. Your provider will periodically review with you your progress at scheduled appointments. However, situations may arise in between visits that may be important to the success of your treatment. Therefore, we ask your cooperation in alerting the injection personnel before receiving an allergy injection of any changes in your health (heart disease, diabetes, cancer, pregnancy etc.) or medications (specifically beta-blockers prescribed by other providers and any over-the-counter, homeopathic, herbal or alternative medicines). This booklet is designed as a resource for you to refer to, for questions that may arise over the course of your treatment. However, do not assume the extent of the information contained within these pages is  your only resource. If you have questions or situations that arise please feel free to ask. Sincerely,  [facility]  Michael Garsia, DNP    General Allergy Injection Procedure  1. We will confirm your name and date of birth along with asking you to by checking the name on your vials and date of birth at each visit. 2.. We will ask if you had any problems with your last injection after you left the office. Specifically:   Bumps at the site that may have developed or enlarged - especially the day after the injection.    Itching away from the injection site   Hives   Sneezing   Shortness of breath, wheezing or chest tightness   Throat tightness  If any of these symptoms, or anything you may be concerned or have questions about has arisen, now is the time to discuss these concerns, NOT after you have received your injection. (See Allergy Injection Reactions)  3. We will need to be able to get to your upper arm to give your injection, so you may want to consider wearing short sleeves and/or easy clothing to remove on days you plan on getting an injection. 4. Allergy injections are given in the outer aspect of the upper arm. Other areas of the arm will increase the irritation or may affect local nerves. Please do not ask for an injection in any other place. 5. After your injection you are REQUIRED to wait 30 minutes in the waiting room. This is for your safety in the event of serious side effects developing. Remember: A systemic life-threatening reaction may occur at any time, even after years of trouble free injections. 6. Before leaving the office have your arm checked by the nurses or medical staff. 7. If you are a new injection patient we ask that you come in twice a week in order to reach your maximum concentration or  maintenance dose as quickly as possible. Thereafter you will be coming in weekly. The nurse/medical assistant or provider will inform you when you can reduce to weekly injections based on your schedule and after the nurse/medical assistant speaks with the provider. 10. Ordering antigen. Insurances will require us to make up either a 3 or 6-month supply of antigen. 11. Vacations/Missed Doses. Please get an injection immediately before leaving on vacation and upon return. This minimizes the impact of being off your injections for an extended period of time. 12. Please notify us of any new medications prescribed by other providers. Some medications (specifically beta-blockers meds ending in [address] as in [place], these medications are contraindicated if on allergy injections.  If you are on a beta-blocker medication, we will have to discontinue allergy injections and request that you see your provider about a possible change in medications. 13. Do not exercise or play sports 1 hour before or after receiving an allergy injection. 14. Do not get another injection/immunization on the same day (i.e. MMR, DPT, Influenza, Pneumonia, etc.)  15. Women - Please notify us immediately if you become pregnant. We prefer to STOP allergy injections during the time of your pregnancy. 16. Please notify us immediately of any address or insurance change. If we are not notified of insurance changes you may be liable for charges incurred. Do not get an injection if:   Running a fever   Experiencing asthma symptoms of any degree   Feel ill enough to miss school/work   Have a rash or hives. Call if you have any questions about receiving allergy injections when sick. (See Frequently Asked Questions). 17.  Always have your Epi-pen or Auvi-Q with you. For Patients Receiving Allergy Shots Outside Our Office  At times patients request to receive injections at an office other than one of ours. We are happy to work with your PCP so that you may receive injections. In these instances, certain things will become your responsibility to facilitate this process. 1. To receive injections at a MEDICAL office other than ours there must be a provider, provider assistant (PA), or nurse practitioner (NP) on duty at the time of the injection and for the mandatory 30-minute waiting period. 3. Insure the provider (PA or NP) will agree to give the allergy injections. Not all providers (PA or NP) are comfortable giving allergy injections or have the staff available to provide this service. Please do this before asking us to mail the antigen. 4. The antigen will be mailed via certified mail to the provider's office. The provider's office will be asked not to relinquish the antigen to you, but to mail it directly back to our office should it be necessary.   5. While receiving allergy injections outside our office, follow the recommended schedule of increasing dose by 0.05 per injection and do not exceed a maximum of 0.50 ml per dose. 6. DO NOT GIVE  ANTIGEN. Each antigen bottle is labeled with an expiration date, please dispose of  antigen. 7.  If there are any problems in regards to receiving the antigen you must come back to our office to receive any additional injections. 8. REORDERING - Approximately 2 weeks before the expiration date on the antigen bottles notify our office a refill is needed. We MUST receive paperwork back from the outside office for injections given before the refill will be completed. How Do Allergy Injections Work? In order to understand the mechanism of allergy injections we must first review the allergic response. When pollen, dander, or another allergen is introduced into the body of an allergic person, the body may respond by developing allergic  antibodies (IgE). As more and more allergen over time are introduced into the body, more allergic antibodies are made. Allergic antibodies attach to specialized allergy cells called mast cells. On future allergen exposure, the mast cell is signaled to release histamine, leukotriene and other allergy causing chemicals that attach to various parts of the body  producing symptoms commonly seen with allergies like: sneezing, itchy watery eyes, runny nose, congestion, wheezing, cough, etc. Allergy injections stimulate the body to make blocking antibodies. Blocking antibody, as the name implies, blocks the attachment of allergens to allergic antibodies on mast cells, thereby preventing the release of the allergy causing chemicals (histamine, leukotriene, etc.) The longer the stimulation and production of blocking antibody (i.e. the longer allergy injections continue) the more complete the blocking of the mast cells.  Also, while allergy injections  are increasing blocking antibodies, allergic antibodies are decreasing, thereby producing effective results by two mechanisms. What is in [address]? Allergy shots contain those allergens that were positive on skin testing (a positive test is a 3mm) after your provider considers your history and environmental exposures. A formula or recipe is written by your provider and the serum made is specific to your test. There are guidelines for mixing extracts, and a certain amount of experience on the allergist's part to optimize this process. But, let's go a little deeper with this question. Where do we get the pollen, dander, mold spores and dust (and dust mite) that we use to make the allergy extract? The pollens are collected from cultivated fields, green houses, or from nature by vacuum collection or a drying process. The pollens are then filtered extensively to isolate a single pollen and to remove contaminants. The animal dander is collected from the pelt and skin of healthy animals. Mold spores and dust mite allergens are cultivated in controlled conditions in laboratories. The industry is rigorously monitored and each allergen is standardized to minimize variations. This is important to insure a constant potency. Finally, each allergen batch is tested before being approved for human use. A Note on Food Allergies  Immunotherapy to food allergies has not been proven safe and effective. Therefore, foods will not be put in your allergy injections. The best treatment for food allergies is avoidance of that food. Allergy Extracts  The allergy extract or antigen is the mix that you receive in your allergy injections. It is not a serum. Our provider individualizes each extract for the patient. It is reflective of your skin test results, history and environmental exposures. Our providers review the results of your skin testing along with your allergy history to generate a formula or recipe for your allergy extract.  Each extract is formulated in a maximum concentration of 1:20 (allergen: diluent). From this 1:20 concentration a series of dilutions are made, each 10 times weaker than the previous, until the starting dilution is reached. Your starting dilution is determined by the provider and is dependent on sensitivity, history and the season in which you are initiating allergy injections. Each dilution is color coded for  easy identification. Top Color Concentration  Silver top 1:10,000   Green top 1:1,000  Blue top 1:100  Yellow top 1:10  Red top 1:1 (maintenance)    Most patients will start their injections in the 1:10,000 dilutions, and progress up from there in a stepwise fashion. A minimum of ten shots of a pre-determined amount is given from each dilution, starting with a small amount and progressing to a larger and more potent amount with each injection, as long as there are no reactions noted. An ideal, reaction-free progression is as follows:  1:10,000 1:1,000  1:100  1:10  1:1  0.05cc  0.05cc  0.05cc  0.05cc  0.05cc  0.10cc  0.10cc  0.10cc  0.10cc  0.10cc  0.15cc  0.15cc  0.15cc  0.15cc  0.15cc  0.20cc  0.20cc  0.20cc  0.20cc  0.20cc  0.25cc  0.25cc  0.25cc  0.25cc  0.25cc  0.30cc  0.30cc  0.30cc  0.30cc  0.30cc  0.35cc  0.35cc  0.35cc  0.35cc  0.35cc  0.40cc  0.40cc  0.40cc  0.40cc  0.40cc  0.45cc  0.45cc  0.45cc  0.45cc  0.45cc  0.50cc  0.50cc  0.50cc  0.50cc  0.50cc        Exceptions to the progression noted above do occur. The injection personnel and/or your provider will decide if it is necessary to reduce, hold or slow your progression at a particular level. This decision is based on reactions, type of reactions, symptomatic response, and your comfort level. Allergy Injection Reactions  Allergy injections, just like any medication or therapy, has the potential for adverse reactions. However, with other medication reactions you may be required to stop that medication, with allergy injections we expect some degree of reaction to occur.  Which isn't to say we ignore reactions, just the opposite. We pay very, very close attention to  reactions that occur from allergy injections. We want and need for you to do the same. These reactions occur in two forms: local and systemic. This is your body's way of telling us we are progressing too fast. THIS IS NOT A RACE! Local Reactions  These are reactions that occur at the injection site and consist of itching and bumps. Depending on the size of the bump and degree of itching, your shot may be reduced slightly. After a reduction, you will resume advancing just as you did before the reaction occurred. In most instances you will progress right past this same dose without difficulty. Occasionally, some patients may have difficulty progressing through a dilution, and if so, we will slow the progression and advance you  slower by either repeating a dose or advancing in smaller amounts. Local reactions of bumps bigger than a dime in size or an uncomfortable amount of itching are required to be reported to the injection personnel before you leave the office. If the reaction gets larger or itchier after leaving the office, please report the final size to the injection personnel before your next  injection. Systemic Reaction  This reaction is life threatening. Almost all systemic reactions occur within in the first 30-minutes of receiving an allergy injection, hence the reason for the mandatory 30-minute wait in our office or any medical facility after receiving an allergy  injection. A systemic reaction may occur at any time during your allergy treatment. Even with being on your maintenance dose for several years, it is still possible (and does occur) for a systemic reaction to occur.  A systemic reaction may have any or all of the following:   Sneezing   Runny and/or itchy nose   Nasal congestion   Itchy and/or watery eyes   Itchy ears or palate (roof of the mouth)   Coughing   Shortness of breath or wheezing   Fainting   maintenance concentration. The maintenance concentration (the dose at which the body is most comfortable and doesn't produce repeated severe reactions) will be individualized and adjusted as tolerated. Your maintenance concentration may be lower than the maximum concentration of 1:20 if you are a sensitive individual. A majority will obtain the 1:20 concentration after about 3-4 months of twice weekly injections. Some may never reach that level. These are generalities; you will benefit from your allergy injections at any concentration. Our goal is to get you safely to your maintenance concentration to effectively reduce your allergy symptoms. For your safety:    Accurately report all reactions, local and systemic.  Always wait the mandatory 30-minute waiting period after an injection. Are Your Allergy Injections Helping? Take a mini self-assessment test. To assess whether or not your allergy injections are beneficial, recall your symptoms or the condition that brought you to our office. For some, this may have been frequent respiratory, sinus or ear infections,  uncontrollable asthma or uncontrollable allergy symptoms (sneezing, itchy eyes, nose or palate, runny nose, watery eyes, etc.) Reassess those symptoms now; usually some improvement will have occurred in about 6-12 months. Ask yourself:   Are my allergy symptoms better?  Am I using less medication?  Am I having less frequent sinus, ear or respiratory infections?  Is my asthma under better control?  Do I feel better?  If I miss my allergy injection, do I feel worse? If you've answered yes to any of these questions, you are starting to exhibit improvement from your allergy injections. Assessing for improvement can also be accomplished by reviewing your symptom changes over the years you've been on allergy injections. Compare apples to apples when assessing for improvement. Specifically, compare same seasons (i.e. spring to spring). Compare the spring before starting allergy injections to the subsequent springs after starting allergy injections. Your own symptoms vary from season to season so comparing fall to spring will be misleading. Frequently Asked Questions  1. Do I need to be tested again, and if so how often? Your providers may retest every 3 years. This allows for adjustment of your allergy extract. New allergies can develop and these may need to be added. 2. What do I do when I'm gone on vacations or business travel? Consistency is the one most important aspect of your injections. The more consistent you are with injections the better the response you will receive. When going on vacation or business trips try as much as possible to get an injection just prior to leaving, and as soon as possible upon return. For most short trips (< 21 days) allergy injections will be minimally affected. 3. Should I receive an injection if I'm sick? If you are ill enough not to go to work or school, postpone your shot. If you are running a fever or having asthma symptoms, do not get an injection until these symptoms have completely resolved. You may receive an injection if you are on an antibiotic or prednisone as long as any fever and/or asthma symptoms have resolved completely. 4. Should I tell my other providers that I'm on allergy injections? Yes, even though this is not a medication. There are medications that should not be given with allergy injections, so please always list your allergy injections on medication forms or whenever asked about medications. 5. How long will I be on allergy injections. The average is 3-5 years. Remaining consistent (weekly) with injections will help achieve a quicker response. Those who have few allergies may respond faster than those who have many allergies. Do not stop the injections on your own; if concerns arise please contact our office to discuss them.   6. What to do if my son/daughter is going away to college and they are on allergy injections? Allergy injections are given at most colleges. Frequently, the student health department will give allergy injections. We will send the allergy extract on to them with complete instructions. If the institution has any questions, they are instructed to contact us before giving any allergy injection. We will then see the student for regular checkups during school breaks when they are home. 7. What do I do if I'm moving? If you are moving, you will need to contact an allergist local to the area in which you are moving. We will need their contact information to transfer your care. We will mail allergy extract to your new provider. 8. Can I, or a friend, give myself injections at home? No. This is a treatment with serious implications. Should you have a reaction you would not be able to give yourself CPR or drive yourself to an emergency room or even call for help. Injections are only given in a facility with a medical provider available. 9. How do I reorder antigen when it is all gone or ? If you receive your injections in our office, we will reorder as necessary. If you are receiving your injections at another office, see the section For Patients Receiving Allergy Shots Outside [address] [place] [address]. 10. What if I am, or want to become, pregnant? Notify us immediately, if you are or think you are pregnant. During the course of your pregnancy we prefer you stop injections. .  11. Can I get a shot if I have a rash? It may depend on the rash and location of the rash. If there is a new rash on the arms we won't be able to give you an injection. If this is a chronic condition and hasn't changed or doesn't involve the arms you can proceed with injections. In general, all new rashes (or hives) will have to be assessed on a case-by-case basis. Please feel free to call our office to discuss. 12. I always get a bump after my shot, is this significant?   Yes, ALL bumps are concentration. Then 0.5 ML's was extracted from the blue vial and placed into Green vial with dilute to achieve a 1:1,000 concentration. Finally 0.5 ML's was taken from the green vial and placed in the Silver vial with dilutant to achieve a 1:10,000 concentration. TOTAL DOSES 50     Patient vials were labeled with name, date-of-birth, vial number, concentration, and expiration. When injecting from a new  vial the first injections is 0.05 ml and are increased by 0.05 increments until 0.5ml from the vial is achieved or the patient reaches maximum tolerated dose. Injections are given once ot three times weekly and at least 24 hours apart, according to provider orders. If patient has complications or \"knots\" or maximum tolerance the dose building is reduced, stopped, or maintained according to patient reaction and provider orders. This is documented on the injection log. Patients on build-up plan should be seen every 3 months or sooner if necessary. The injection record and serum is reviewed and documented at every injection appointment. When down to the last 1/3 of the bottle of the red 1:1 concentration the patient should be scheduled an appointment for new orders for allergy maintenance concentrations. If it is the patients preference to receive and injection at an outside medical office, is is allowed only after the patient receives the first dose from each vial at this practice. Patients requesting refills that receive injections at outside medical facilities must include the patient's demographic sheet, current insurance information and the injection records. Allow 2-3 weeks for delivery after the refill has been requested. PROTOCOL FOR LATE INJECTIONS  Days late determined from the due date of the injection    Shot Frequency 5-10 Days Late 11-16 Days Late 17-30 Days Late 31-60 Days Late 61+ Days Late   Twice Weekly Repeat last dose Reduce last dose . 2 Reduce last dose . 4 Dilute back 1 vial, start at .05 Patient must start over     Weekly Repeat last dose Reduce last dose . 2 Reduce last dose . 4 Dilute back 1 vial, start at .05 Patient must start over   Every 2 Weeks Repeat last dose Reduce last dose . 2. Reduce last dose . 4 Ask provider for instruction Ask provider for instruction   Every 3 Weeks Repeat last dose Reduce last dose . 2 Reduce last dose . 4 Ask provider for instruction Ask provider for instruction   Every 4 Weeks Repeat last dose Reduce last dose . 2 Reduce last dose . 4 Ask provider for instruction Ask provider for instruction     Patient/gaurdian made aware of potential anaphylaxis risks associated with receiving allergy injections and wishes to proceed. SHOT REACTION TREATMENT INSTRUCTIONS    During the 30 minute wait after an allergy injection the following symptoms should be reported:    Itching other than at the injection site  Hives or swelling other than at the injection site  Redness other than at the injection site  Difficulty breathing  Chest tightness  Difficulty swallowing  Throat tightness    If these symptoms occur, NOTIFY PROVIDER and the following treatment should be administered:    1. Epinephrine 1:1000 IM - 0.3 ml if > 66 lbs or more, 0.15 ml if 33 - 63 lbs, or 0.1 ml if <33 lbs   2. Diphenhydramine - give all intramuscular:     2 to <6 years (off-label use): 6.25 mg,    6 to <12 years: 12.5 to 25 mg;    ?12 years: 25-50 mg.  3.  Famotidine:  Adults 40 mg oral    Adolescents age 12 years and >88 lbs: 40 mg    Children and Adolescents ? 12years of age: Initial: 0.25 mg/kg/dose   every 12 hours (maximum daily dose: 40 mg/day)    Epi dose may me repeated in 5-15 minutes if adequate resolution of symptoms does not occur    Patient should be observed for at least one hour after final epi dose and must be seen by provider. Patients cannot drive themselves if they have received diphenhydramine.

## 2020-10-10 ENCOUNTER — HOSPITAL ENCOUNTER (OUTPATIENT)
Dept: CT IMAGING | Age: 30
Discharge: HOME | End: 2020-10-10
Payer: COMMERCIAL

## 2020-10-10 ENCOUNTER — HOSPITAL ENCOUNTER (OUTPATIENT)
Age: 30
Discharge: HOME | End: 2020-10-10
Payer: COMMERCIAL

## 2020-10-10 PROCEDURE — 86003 ALLG SPEC IGE CRUDE XTRC EA: CPT

## 2020-10-10 PROCEDURE — 86682 HELMINTH ANTIBODY: CPT

## 2020-10-10 PROCEDURE — 36415 COLL VENOUS BLD VENIPUNCTURE: CPT

## 2020-10-10 PROCEDURE — 70486 CT MAXILLOFACIAL W/O DYE: CPT

## 2020-10-15 LAB
ALLERGEN BARLEY IGE: 0.38 KU/L
ALLERGEN BEEF: 0.13 KU/L
ALLERGEN CABBAGE IGE: 0.19 KU/L
ALLERGEN CARROT IGE: 0.25 KU/L
ALLERGEN CHICKEN IGE: < 0.1 KU/L
ALLERGEN CODFISH IGE: 0.15 KU/L
ALLERGEN CORN IGE: 0.35 KU/L
ALLERGEN CRAB IGE: 0.24 KU/L
ALLERGEN EGG WHITE IGE: 0.35 KU/L
ALLERGEN GRAPE IGE: < 0.1 KU/L
ALLERGEN INTERPRETATION/SCORE: ABNORMAL
ALLERGEN LETTUCE IGE: 0.16 KU/L
ALLERGEN MILK IGE: 0.3 KU/L
ALLERGEN NAVY BEAN: 0.11 KU/L
ALLERGEN OAT: 0.54 KU/L
ALLERGEN ORANGE IGE: < 0.1 KU/L
ALLERGEN PEPPER C. ANNUUM IGE: 0.32 KU/L
ALLERGEN PORK: 2.57 KU/L
ALLERGEN POTATO IGE: < 0.1 KU/L
ALLERGEN RICE IGE: 0.22 KU/L
ALLERGEN RYE IGE: 0.31 KU/L
ALLERGEN SHRIMP IGE: 0.28 KU/L
ALLERGEN SOYBEAN IGE: 0.32 KU/L
ALLERGEN TOMATO IGE: 0.69 KU/L
ALLERGEN TUNA IGE: < 0.1 KU/L
ALLERGEN WHEAT IGE: 0.48 KU/L
STRONGYLOIDES ANTIBODY: NORMAL

## 2020-10-28 ENCOUNTER — OFFICE VISIT (OUTPATIENT)
Dept: ENT CLINIC | Age: 30
End: 2020-10-28
Payer: COMMERCIAL

## 2020-10-28 VITALS
HEIGHT: 66 IN | HEART RATE: 70 BPM | OXYGEN SATURATION: 99 % | BODY MASS INDEX: 25.78 KG/M2 | SYSTOLIC BLOOD PRESSURE: 118 MMHG | TEMPERATURE: 97.9 F | DIASTOLIC BLOOD PRESSURE: 72 MMHG | WEIGHT: 160.4 LBS

## 2020-10-28 PROBLEM — J02.9 REFLUX PHARYNGITIS: Status: ACTIVE | Noted: 2020-10-28

## 2020-10-28 PROBLEM — H69.83 DYSFUNCTION OF BOTH EUSTACHIAN TUBES: Status: ACTIVE | Noted: 2020-10-28

## 2020-10-28 PROBLEM — G44.009 MIGRAINE-CLUSTER HEADACHE SYNDROME: Status: ACTIVE | Noted: 2020-10-28

## 2020-10-28 PROBLEM — J45.909 REACTIVE AIRWAY DISEASE: Status: ACTIVE | Noted: 2020-10-28

## 2020-10-28 PROBLEM — J30.2 SEASONAL ALLERGIC RHINITIS: Status: ACTIVE | Noted: 2020-10-28

## 2020-10-28 PROBLEM — K22.4 ESOPHAGEAL SPASM: Status: ACTIVE | Noted: 2020-10-28

## 2020-10-28 PROBLEM — H69.93 DYSFUNCTION OF BOTH EUSTACHIAN TUBES: Status: ACTIVE | Noted: 2020-10-28

## 2020-10-28 PROBLEM — K21.00 GASTROESOPHAGEAL REFLUX DISEASE WITH ESOPHAGITIS WITHOUT HEMORRHAGE: Status: ACTIVE | Noted: 2020-10-28

## 2020-10-28 PROBLEM — K44.9 HIATAL HERNIA: Status: ACTIVE | Noted: 2020-10-28

## 2020-10-28 PROCEDURE — 99205 OFFICE O/P NEW HI 60 MIN: CPT | Performed by: OTOLARYNGOLOGY

## 2020-10-28 PROCEDURE — 1036F TOBACCO NON-USER: CPT | Performed by: OTOLARYNGOLOGY

## 2020-10-28 PROCEDURE — G8484 FLU IMMUNIZE NO ADMIN: HCPCS | Performed by: OTOLARYNGOLOGY

## 2020-10-28 PROCEDURE — G8419 CALC BMI OUT NRM PARAM NOF/U: HCPCS | Performed by: OTOLARYNGOLOGY

## 2020-10-28 PROCEDURE — G8427 DOCREV CUR MEDS BY ELIG CLIN: HCPCS | Performed by: OTOLARYNGOLOGY

## 2020-10-28 NOTE — PROGRESS NOTES
[address] [facility] EAR, NOSE AND THROAT  [address] [place] [address]  Dept: [phone]  Dept Fax: [phone]  Loc: [phone]    Dena Johnson is a 35 y.o. female who was referred by ANDERSON Sheth* for:  Chief Complaint   Patient presents with    New Patient     New patient Nasal turbinates ear pain referring Ishan Acosta       HPI:     Dena Johnson is a 35 y.o. female who is referred for evaluation of a complex array of head neck and systemic problems in conjunction to environmental allergies and migraine headaches. The patient describes having \"severe allergies\" lifelong to multiple foods and environmental allergens. She states that allergic flareups are not commonly clear triggers to her migraines but that her migraines when her allergies are particularly activated are far worse than when they are not. She states that the majority of her migraines are centered behind her right eye and frequently are associated with severe right ear pain. She frequently feels fluid behind both tympanic membranes and a muffling of her ability to hear sounds. Sometimes her migraines extend to her central forehead and sometimes extend all the way to her left eye but not commonly. In terms of infectious cofactors, the patient believes she has had her first distinct bacterial sinusitis this past winter with yellow-green rhinorrhea and severe midface pain that did not feel like her migraine headaches. It cleared up with antibiotics. She did not have radiographic evaluation. In terms of a unified respiratory system concept the patient is also known to have had very abnormal pulmonary function tests in the recent past that were described by one clinician has \"horrible\". She is in the process of getting her allergic substances determined.     Of note is the fact that the patient is also experiencing something that seems to have no relationship to these other problems the form of moderate to severe substernal and epigastric pain usually associated with some form of exertion, sometimes just walking a lot. She states that he can feel extremely uncomfortable \"like a burp or gastric bubble that might come up\" and that it can radiate up her sternum or through her back to her right scapula. She states that she also has nighttime sleeping problems that may be a manifestation of airway obstruction. She is known to snore but is near she has been told no one has witnessed her having gasping episodes or apneic intervals. She was always a noisy mouth breather since childhood. She describes her self as a light sleeper which is possibly a coincidence of being the recent mother of 6month-old twins. When she sleeps poorly she will commonly wake up with a migraine. She denies nausea and GI problems related to it. She has had a CT scan of her head that found only a left-sided pierce bullosa and minimal mucous membrane thickening on my own review of the study. She had a tiny indentation that could be considered the opening of a mucus filled pierce bullosa in the right middle turbinate but this was not distinctly evident. Her family rents an apartment and states that the entire apartment is carpeted. She also has cloth curtains and may even be sleeping on a down comforter and feather pillows.   The patient has family history of atopic disease       History:     No Known Allergies  Current Outpatient Medications   Medication Sig Dispense Refill    montelukast (SINGULAIR) 10 MG tablet Take 1 tablet by mouth nightly 30 tablet 5    budesonide-formoterol (SYMBICORT) 160-4.5 MCG/ACT AERO Inhale 2 puffs into the lungs 2 times daily 1 Inhaler 3    albuterol sulfate HFA (PROVENTIL HFA) 108 (90 Base) MCG/ACT inhaler Inhale 2 puffs into the lungs every 6 hours as needed for Wheezing 1 Inhaler 3    cetirizine (ZYRTEC ALLERGY) 10 MG tablet Take 1 tablet by mouth daily 30 tablet 11    acetaminophen (TYLENOL) 325 MG tablet Take 500 mg by mouth every 6 hours as needed for Pain       PRENATAL VIT-DSS-FE FUM-FA PO Take  by mouth.  EPINEPHrine (EPIPEN 2-NATHAN) 0.3 MG/0.3ML SOAJ injection Inject 0.3 mLs into the muscle once for 1 dose Use as directed for allergic reaction 0.3 mL 1     No current facility-administered medications for this visit.       Past Medical History:   Diagnosis Date    Anemia     on iron supplements    Asthma     Albuterol and Advair    Bladder prolapse, female, acquired     Constipation     Headache(784.0)     Interstitial cystitis     Mental disorder     bipolar    Sickle cell anemia ([facility] [address]     Has trait      Past Surgical History:   Procedure Laterality Date    ANKLE SURGERY      bilateral ankle revision    COLONOSCOPY  2013    CYSTOSCOPY  9/28/15    WITH HYDRODISTENTION    DILATION AND CURETTAGE OF UTERUS      for Missed AB    FOOT SURGERY Bilateral 06/22/2016    FOOT SURGERY Left 09/27/2017    osteotomy , gastrocnemius resectopm    FOOT SURGERY Right 03/28/2018    LAPAROSCOPY  7/1/13    OTHER SURGICAL HISTORY Left 02/14/2018    Removal of Screw from Calcaneus Left heel     OR FULL EXCIS 5TH METATARSAL HEAD Right 3/28/2018    MEDIAL CALCANEAL DISPLACEMENT OSTEOTOMY RIGHT FOOT, GASTROCNEMIUS LENGTHENING RIGHT LEG, COTTON OSTEOTOMY FIRST CUNEIFORM RIGHT FOOT WITH FORWEB RIGHT FOOT performed by Gabriel Henry, CATHERINE at [address] [facility] Left 9/27/2017    MEDIAL CALCANEALSLIDE WITH SCREW FIXATION LEFT FOOT, COTTON OSTEOTOMY LEFT FOOT, GASTROC LENGTHENING LEFT LEG performed by Gabriel Henry, CATHERINE at [address] [facility] Left 2/14/2018    REMOVAL OF SCREW DROM CALCANEUS LEFT HEEL performed by Gabriel Henry, DPM at [address] [place] [address]  2014     Family History   Problem Relation Age of Onset    Asthma Mother     Depression Mother     Asthma Father    Kieran Loser Heart Disease Maternal Grandmother     High Blood Pressure Maternal Grandmother      Social History     Tobacco Use    Smoking status: Never Smoker    Smokeless tobacco: Never Used   Substance Use Topics    Alcohol use: No        Subjective:      Review of Systems  Rest of review of systems are negative, except as noted in HPI. Objective:     /72 (Site: Left Upper Arm, Position: Sitting, Cuff Size: Medium Adult)   Pulse 70   Temp 97.9 °F (36.6 °C)   Ht 5' 6\" (1.676 m)   Wt 160 lb 6.4 oz (72.8 kg)   SpO2 99% Comment: on room air at rest  BMI 25.89 kg/m²     Physical Exam     On general physical exam the patient is a pleasant alert oriented cooperative and healthy appearing young adult female in no acute distress. Her voice and her speech pattern were both within normal limits for her age and gender. I heard no throat clearing coughing or inspiratory stridor. Her ears are abnormal for bilateral retraction with likely middle ear fluid that is thick and prevents my view of her osseous middle ear structures. Her ear canals had a modest amount of cerumen but were otherwise within normal limits as were her pinna. Her nose was bilaterally patent with a broad inferior nasal axillary crest and no mucopurulence seen. No polyps were seen. Her oral cavity was normal from her class I Mallampati oropharyngeal aperture to her normal length soft palate with a normal nasopharyngeal inlet and her mucous membranes from posterior nasopharynx to lips being without lesions. There was a moderate amount of clear postnasal drip which seen covering the posterior pharyngeal wall. No yellow-green mucus was seen. The patient's cranial nerves II through XII were grossly and symmetrically intact. Tuning fork studies were not done. Her neck was free of adenopathy and thyromegaly. Her laryngeal crepitance was absent. She had no tenderness in the manipulation of her larynx.     Her lungs were clear to auscultation orders for this visit:     Diagnosis Orders   1. Dysfunction of both eustachian tubes  Audiometry with tympanometry   2. Seasonal allergic rhinitis, unspecified trigger     3. Severe persistent reactive airway disease without complication     4. Gastroesophageal reflux disease with esophagitis without hemorrhage     5. Hiatal hernia     6. Esophageal spasm     7. Migraine-cluster headache syndrome     8. Reflux pharyngitis           Based on her history and these physical findings as well as my review of her radiographic studies, the patient has generalized mucous membrane edema and secondary dysfunction including eustachian tube dysfunction bilaterally. She also likely has bilateral serous otitis media. This, in conjunction with her migraine headaches, likely are two independent processes that have synergized to worsen her symptom profile. It is not hard to imagine how sinusitis and otitis media could exacerbate the discomfort of migraine headaches. Her pulmonary function test are as well very abnormal with numerous low baseline readings and marked improvement with bronchodilation. This establishes her as having reactive airway disease, likely of an allergic nature. From the perspective of her otolaryngology problems, and audiologic evaluation including tympanograms will start the process that will hopefully include an improvement of her eustachian tube function as her reactivity is brought to control. That said, it is still worth while for her to try and persuade her landlord to remove the carpets at least of the bedroom. This coupled with the use of a HEPA filtered vacuum device to [facility] and the mattress (inclusive of the sides and the bottom as well as the back spring) may markedly improve her mucous membrane's allergic irritation.   It would also be worthwhile to include having some form of electric static air  running within bedroom round-the-clock to trap allergens studies. **This report has been created using voice recognition software. It may contain minor errors which are inherent in voice recognition technology. **

## 2020-11-11 ENCOUNTER — OFFICE VISIT (OUTPATIENT)
Dept: ALLERGY | Age: 30
End: 2020-11-11
Payer: COMMERCIAL

## 2020-11-11 VITALS
DIASTOLIC BLOOD PRESSURE: 70 MMHG | BODY MASS INDEX: 25.34 KG/M2 | HEART RATE: 66 BPM | TEMPERATURE: 97.3 F | SYSTOLIC BLOOD PRESSURE: 106 MMHG | WEIGHT: 157 LBS | RESPIRATION RATE: 12 BRPM

## 2020-11-11 PROCEDURE — G8417 CALC BMI ABV UP PARAM F/U: HCPCS | Performed by: NURSE PRACTITIONER

## 2020-11-11 PROCEDURE — 99215 OFFICE O/P EST HI 40 MIN: CPT | Performed by: NURSE PRACTITIONER

## 2020-11-11 PROCEDURE — G8484 FLU IMMUNIZE NO ADMIN: HCPCS | Performed by: NURSE PRACTITIONER

## 2020-11-11 PROCEDURE — 1036F TOBACCO NON-USER: CPT | Performed by: NURSE PRACTITIONER

## 2020-11-11 PROCEDURE — G8427 DOCREV CUR MEDS BY ELIG CLIN: HCPCS | Performed by: NURSE PRACTITIONER

## 2020-11-11 RX ORDER — FEXOFENADINE HCL 180 MG/1
180 TABLET ORAL DAILY
Qty: 30 TABLET | Refills: 2 | Status: SHIPPED | OUTPATIENT
Start: 2020-11-11 | End: 2020-12-11

## 2020-11-11 ASSESSMENT — ENCOUNTER SYMPTOMS
SHORTNESS OF BREATH: 1
SINUS PRESSURE: 1
RHINORRHEA: 1
SINUS PAIN: 1

## 2020-11-11 NOTE — PROGRESS NOTES
@[facility]@    Allergy & Asthma   [address] [facility], [address]  Ph:   [phone]  Fax:[phone]    Provider:  Dr. Riley Jarrett:   Chief Complaint   Patient presents with    Follow-up     Patient is here for 4 week follow up for asthma management and lab review           HISTORY OF PRESENT ILLNESS: ESTABLISHED PATIENT HERE FOR EVALUATION   52-year-old white female here today for asthma and chronic idiopathic urticaria along with allergies. Patient is here today to follow-up on her labs. She complains of itching all over and has chronic idiopathic urticaria. She states that despite being on cetirizine and montelukast nothing helps. In the past patient has also taken prednisone a few times over the last year for her asthma and the urticaria which is helped minimally. She states she has rashes that occur spontaneously and hives that she itches from head to toe. It occurs on her arms legs hands and feet and covers approximately 20 to 30% of her body surface area. She reports that despite taking the medications nothing seems to help. Onset has been chronic and is occurred for several years. Patient states she finds it difficult to work at times. Patient also complains of asthma. She has been taking her Symbicort. She states her asthma is moderately to severe. She does have episodes where she gets short of breath. She denies any nausea, vomiting, fever. Patient has had asthma for a very long period of time. She has been taking montelukast and Symbicort. Patient also has an albuterol inhaler. She reports that she does have triggers of allergens which cause her to have problems with her asthma. Patient also complains of allergies. She would like to get the results of her allergies today. Patient has severe allergies. She reports nasal congestion rhinoconjunctivitis. She also has ear infusions. She recently saw ENT.   They have recommend possibly that she has surgery on her ears if her allergies cannot get under control. She is willing to do anything to get her allergies under control as she is being impacted with her quality of her life and they are severe. Patient states that she has a really hard time with her allergies get in under control. She has a lot of head pressure and congestion. Postnasal drainage. Cough and sneezing. She also believes this is also contributing to her urticaria        Review of Systems:  Review of Systems   HENT: Positive for congestion, postnasal drip, rhinorrhea, sinus pressure and sinus pain. Respiratory: Positive for shortness of breath. Skin: Positive for rash. Patient with chronic idiopathic urticaria. Complains of rash all over   Neurological: Positive for headaches. All other systems reviewed and are negative.         Past MedicalHistory:    Past Medical History:   Diagnosis Date    Anemia     on iron supplements    Asthma     Albuterol and Advair    Bladder prolapse, female, acquired     Constipation     Headache(784.0)     Interstitial cystitis     Mental disorder     bipolar    Sickle cell anemia ([facility] [address]     Has trait       Past Surgical History:  Past Surgical History:   Procedure Laterality Date    ANKLE SURGERY      bilateral ankle revision    COLONOSCOPY  2013    CYSTOSCOPY  9/28/15    WITH HYDRODISTENTION    DILATION AND CURETTAGE OF UTERUS      for Missed AB    FOOT SURGERY Bilateral 06/22/2016    FOOT SURGERY Left 09/27/2017    osteotomy , gastrocnemius resectopm    FOOT SURGERY Right 03/28/2018    LAPAROSCOPY  7/1/13    OTHER SURGICAL HISTORY Left 02/14/2018    Removal of Screw from Calcaneus Left heel     [place] FULL EXCIS 5TH METATARSAL HEAD Right 3/28/2018    MEDIAL CALCANEAL DISPLACEMENT OSTEOTOMY RIGHT FOOT, GASTROCNEMIUS LENGTHENING RIGHT LEG, COTTON OSTEOTOMY FIRST CUNEIFORM RIGHT FOOT WITH FORWEB RIGHT FOOT performed by Gerri Aguilera DPM at [address] [place] [address] CENTER OR    [place] GASTROCNEMIUS RECESSION Left 9/27/2017    MEDIAL CALCANEALSLIDE WITH SCREW FIXATION LEFT FOOT, COTTON OSTEOTOMY LEFT FOOT, GASTROC LENGTHENING LEFT LEG performed by Rohini Ortiz DPM at [address] [place] [address] IMPLANT Left 2/14/2018    REMOVAL OF SCREW DROM CALCANEUS LEFT HEEL performed by Rohini Ortiz DPM at [address] [place] [address]  2014       Family History:   Family History   Problem Relation Age of Onset    Asthma Mother     Depression Mother     Asthma Father     Heart Disease Maternal Grandmother     High Blood Pressure Maternal Grandmother        Social History:   Social History     Tobacco Use    Smoking status: Never Smoker    Smokeless tobacco: Never Used   Substance Use Topics    Alcohol use: No        Allergies:  Patient has no known allergies.     CurrentMedications:     Current Outpatient Medications:     fexofenadine (ALLEGRA) 180 MG tablet, Take 1 tablet by mouth daily, Disp: 30 tablet, Rfl: 2    montelukast (SINGULAIR) 10 MG tablet, Take 1 tablet by mouth nightly, Disp: 30 tablet, Rfl: 5    budesonide-formoterol (SYMBICORT) 160-4.5 MCG/ACT AERO, Inhale 2 puffs into the lungs 2 times daily, Disp: 1 Inhaler, Rfl: 3    albuterol sulfate HFA (PROVENTIL HFA) 108 (90 Base) MCG/ACT inhaler, Inhale 2 puffs into the lungs every 6 hours as needed for Wheezing, Disp: 1 Inhaler, Rfl: 3    acetaminophen (TYLENOL) 325 MG tablet, Take 500 mg by mouth every 6 hours as needed for Pain , Disp: , Rfl:     PRENATAL VIT-DSS-FE FUM-FA PO, Take  by mouth.  , Disp: , Rfl:     EPINEPHrine (EPIPEN 2-NATHAN) 0.3 MG/0.3ML SOAJ injection, Inject 0.3 mLs into the muscle once for 1 dose Use as directed for allergic reaction, Disp: 0.3 mL, Rfl: 1      Physical Exam:      Vitals:    Vitals:    11/11/20 1023   BP: 106/70   Pulse: 66   Resp: 12   Temp: 97.3 °F (36.3 °C)       157 lb (71.2 kg)       Temp: 97.3 °F (36.3 °C) I @FLOWSTAT(6)@ IPulse: 66 I @FLOWSTAT(8)@ I BP: 106/70 I @NCUTGT(07)@; @IJLBPE(26)@ I Resp: 12 I @FLOWSTAT(9)@ I   I @FLOWSTAT(10)@ I   I   I   I Facility age limit for growth percentiles is 20 years. I     Facility age limit for growth percentiles is 20 years. Facility age limit for growth percentiles is 20 years. Facility age limit for growth percentiles is 20 years. Facility age limit for growth percentiles is 20 years. Physical Exam:    Physical Exam  Vitals signs and nursing note reviewed. Constitutional:       Appearance: Normal appearance. She is normal weight. HENT:      Head: Normocephalic and atraumatic. Right Ear: Tympanic membrane, ear canal and external ear normal.      Left Ear: Tympanic membrane, ear canal and external ear normal.      Ears:      Comments: Bilateral ear effusions     Nose: Rhinorrhea present. Mouth/Throat:      Mouth: Mucous membranes are moist.      Pharynx: Oropharynx is clear. Eyes:      Extraocular Movements: Extraocular movements intact. Conjunctiva/sclera: Conjunctivae normal.      Pupils: Pupils are equal, round, and reactive to light. Neck:      Musculoskeletal: Normal range of motion and neck supple. Cardiovascular:      Rate and Rhythm: Normal rate and regular rhythm. Pulses: Normal pulses. Heart sounds: Normal heart sounds. Pulmonary:      Effort: Pulmonary effort is normal.      Breath sounds: Normal breath sounds. Musculoskeletal: Normal range of motion. Skin:     General: Skin is warm and dry. Capillary Refill: Capillary refill takes less than 2 seconds. Neurological:      General: No focal deficit present. Mental Status: She is alert and oriented to person, place, and time. Mental status is at baseline. Psychiatric:         Mood and Affect: Mood normal.         Behavior: Behavior normal.         Thought Content:  Thought content normal.         Judgment: Judgment normal.             DATA:  Lab Review:    CBC:   Lab Results   Component Allergen [place]  0. 72High    <=0.34 kU/L  Final  08/27/2020  1:10 PM  [facility]    Allergen Fungi/Mold P. [place]atum IgE  0. 38High    <=0.34 kU/L  Final  08/27/2020  1:10 PM  [facility]    Common-Short Ragweed IgE  12. 30High    <=0.34 kU/L  Final  08/27/2020  1:10 PM  [facility]    Cockroach IgE  0. 37High    <=0.34 kU/L  Final  08/27/2020  1:10 PM  [facility]    Allergen Tree [place]  0.18  <=0.34 kU/L  Final  08/27/2020  1:10 PM  [facility]    [place] Tree IgE  0. 71High    <=0.34 kU/L  Final  08/27/2020  1:10 PM  [facility]    Pecan Tree IgE  0.32  <=0.34 kU/L  Final  08/27/2020  1:10 PM  [facility]    Mouse Epithelial  5. 68High    <=0.34 kU/L  Final  08/27/2020  1:10 PM  [facility]    Allergen Fungi/Mold, M. racemosus IGE  0. 40High    <=0.34 kU/L  Final  08/27/2020  1:10 PM  [facility]    Allergen [place] Tree, IGE  0.11  <=0.34 kU/L  Final  08/27/2020  1:10 PM  [facility]    Dog Dander IgE  29. 10High    <=0.34 kU/L  Final  08/27/2020  1:10 PM  [facility]    Sheep Sorrel IgE  0.17  <=0.34 kU/L  Final  08/27/2020  1:10 PM  [facility]    Performed By: Janie Tate [address] [place] [address], [address] [place] [address]   : Guerda Del Toro. MD Omer    Immunoglobulin E  825High    <=214 kU/L  Final  08/27/2020  1:10 PM  [facility]    REFERENCE INTERVAL: Immunoglobulin E, Serum   Access complete set of age- and/or gender-specific reference   intervals for this test in the [facility] Test Directory   ([facility]pl[facility].com). Allergen Interpretation/Score  SEE BELOW   Final  08/27/2020  1:10 PM  [facility]    Allergen, Interp, Immunocap Score Ig      See Note   REFERENCE INTERVAL: Allergen, Interpretation    Less than 0.10 kU/L. ... [place] Class 0... [place] No significant level detected    0.10-0.34 kU/L. ......... [place] Class 0/1. [place] Clinical relevance   undetermined    0.35-0.70 kU/L. ......... [place] Class 1... [place] Low    0.71-3.50 kU/L. ......... [place] Class 2. .. [place] Moderate    3.51-17.50 kU/L. ........ [place] Class 3... [place] High    17.51-50.00 kU/L. ....... [place] Class 4. .. [place] Very High    50..00 kU/L. ...... [place] Class 5. .. [place] Very High  Greater than 100.00kU/L. [place]arly[place] Magic Class 6. .. Darlynn Magic Darlynn Magic Very High   Allergen results of 0.10-0.34 kU/L are intended for specialist use   as the clinical relevance is undetermined. Even though increasing   ranges are reflective of increasing concentrations of   allergen-specific IgE, these concentrations may not correlate with   the degree of clinical response or skin testing results when   challenged with a specific allergen. The correlation of allergy   laboratory results with clinical history and in vivo reactivity to   specific allergens is essential. A negative test may not rule out   clinical allergy or even anaphylaxis. Performed By: Chas Tate [address]   [place], [address] [facility]   : Chidi Barton. Claudette Frederic, MD    Testing Performed By     [facility] Valeriy Suazo  Name  Director  Address  Valid Date Range    [address] [facility] [address]   [address] [address]  08/31/17 0933-Present    Lab and Collection     ALLERGEN INHALANT [place] COMP 1 - 8/27/2020   Result History     [facility]    Result Information     Flag: AbnormalAbnormal     Status: Final result (Collected: 8/27/2020 13:10)  Provider Status: Reviewed    All Reviewers List     ANDERSON Davis CNP on 6/5/5621 76:93    Click to Print Result    View [address] COMP 1 (Order #[id number]) on 8/27/20    Order Report     Order Details       Assessment/Orders:    Diagnosis Orders   1. Chronic idiopathic urticaria  fexofenadine (ALLEGRA) 180 MG tablet       Plan:  Follow Up:4 weeks    Refer to nutrition for food allergies. Patient switch from cetirizine to Allegra for her chronic idiopathic urticaria. She is to continue the montelukast    Patient to continue Symbicort    Discussed all labs with patient. Patient's IgE is 825. I did discuss starting allergy injections with patient. She is amendable to starting allergy injections.   Also discussed cooperation in alerting the injection personnel before receiving an allergy injection of any changes in your health (heart disease, diabetes, cancer, pregnancy etc.) or medications (specifically beta-blockers prescribed by other providers and any over-the-counter, homeopathic, herbal or alternative medicines). This booklet is designed as a resource for you to refer to, for questions that may arise over the course of your treatment. However, do not assume the extent of the information contained within these pages is  your only resource. If you have questions or situations that arise please feel free to ask. Sincerely,  Allergy [facility] Asthma Care  [facility], DNP    General Allergy Injection Procedure  1. We will confirm your name and date of birth along with asking you to by checking the name on your vials and date of birth at each visit. 2.. We will ask if you had any problems with your last injection after you left the office. Specifically:   Bumps at the site that may have developed or enlarged - especially the day after the injection.  Itching away from the injection site   Hives   Sneezing   Shortness of breath, wheezing or chest tightness   Throat tightness  If any of these symptoms, or anything you may be concerned or have questions about has arisen, now is the time to discuss these concerns, NOT after you have received your injection. (See Allergy Injection Reactions)  3. We will need to be able to get to your upper arm to give your injection, so you may want to consider wearing short sleeves and/or easy clothing to remove on days you plan on getting an injection. 4. Allergy injections are given in the outer aspect of the upper arm. Other areas of the arm will increase the irritation or may affect local nerves. Please do not ask for an injection in any other place. 5. After your injection you are REQUIRED to wait 30 minutes in the waiting room.  This is for your safety in the event of serious side effects developing. Remember: A systemic life-threatening reaction may occur at any time, even after years of trouble free injections. 6. Before leaving the office have your arm checked by the nurses or medical staff. 7. If you are a new injection patient we ask that you come in twice a week in order to reach your maximum concentration or  maintenance dose as quickly as possible. Thereafter you will be coming in weekly. The nurse/medical assistant or provider will inform you when you can reduce to weekly injections based on your schedule and after the nurse/medical assistant speaks with the provider. 10. Ordering antigen. Insurances will require us to make up either a 3 or 6-month supply of antigen. 11. Vacations/Missed Doses. Please get an injection immediately before leaving on vacation and upon return. This minimizes the impact of being off your injections for an extended period of time. 12. Please notify us of any new medications prescribed by other providers. Some medications (specifically beta-blockers meds ending in [address] as in [place], these medications are contraindicated if on allergy injections. If you are on a beta-blocker medication, we will have to discontinue allergy injections and request that you see your provider about a possible change in medications. 13. Do not exercise or play sports 1 hour before or after receiving an allergy injection. 14. Do not get another injection/immunization on the same day (i.e. MMR, DPT, Influenza, Pneumonia, etc.)  15. Women - Please notify us immediately if you become pregnant. We prefer to STOP allergy injections during the time of your pregnancy. 16. Please notify us immediately of any address or insurance change. If we are not notified of insurance changes you may be liable for charges incurred.   Do not get an injection if:   Running a fever   Experiencing asthma symptoms of any degree   Feel ill enough to miss school/work   Have a rash or hives. Call if you have any questions about receiving allergy injections when sick. (See Frequently Asked Questions). 17.  Always have your Epi-pen or Auvi-Q with you. For Patients Receiving Allergy Shots Outside Our Office  At times patients request to receive injections at an office other than one of ours. We are happy to work with your PCP so that you may receive injections. In these instances, certain things will become your responsibility to facilitate this process. 1. To receive injections at a MEDICAL office other than ours there must be a provider, provider assistant (PA), or nurse practitioner (NP) on duty at the time of the injection and for the mandatory 30-minute waiting period. 3. Insure the provider (PA or NP) will agree to give the allergy injections. Not all providers (PA or NP) are comfortable giving allergy injections or have the staff available to provide this service. Please do this before asking us to mail the antigen. 4. The antigen will be mailed via certified mail to the provider's office. The provider's office will be asked not to relinquish the antigen to you, but to mail it directly back to our office should it be necessary. 5. While receiving allergy injections outside our office, follow the recommended schedule of increasing dose by 0.05 per injection and do not exceed a maximum of 0.50 ml per dose. 6. DO NOT GIVE  ANTIGEN. Each antigen bottle is labeled with an expiration date, please dispose of  antigen. 7.  If there are any problems in regards to receiving the antigen you must come back to our office to receive any additional injections. 8. REORDERING - Approximately 2 weeks before the expiration date on the antigen bottles notify our office a refill is needed. We MUST receive paperwork back from the outside office for injections given before the refill will be completed. How Do Allergy Injections Work?   In order to understand the mechanism of allergy injections we must first review the allergic response. When pollen, dander, or another allergen is introduced into the body of an allergic person, the body may respond by developing allergic  antibodies (IgE). As more and more allergen over time are introduced into the body, more allergic antibodies are made. Allergic antibodies attach to specialized allergy cells called mast cells. On future allergen exposure, the mast cell is signaled to release histamine, leukotriene and other allergy causing chemicals that attach to various parts of the body  producing symptoms commonly seen with allergies like: sneezing, itchy watery eyes, runny nose, congestion, wheezing, cough, etc. Allergy injections stimulate the body to make blocking antibodies. Blocking antibody, as the name implies, blocks the attachment of allergens to allergic antibodies on mast cells, thereby preventing the release of the allergy causing chemicals (histamine, leukotriene, etc.) The longer the stimulation and production of blocking antibody (i.e. the longer allergy injections continue) the more complete the blocking of the mast cells. Also, while allergy injections  are increasing blocking antibodies, allergic antibodies are decreasing, thereby producing effective results by two mechanisms. What is in [address]? Allergy shots contain those allergens that were positive on skin testing (a positive test is a 3mm) after your provider considers your history and environmental exposures. A formula or recipe is written by your provider and the serum made is specific to your test. There are guidelines for mixing extracts, and a certain amount of experience on the allergist's part to optimize this process. But, let's go a little deeper with this question. Where do we get the pollen, dander, mold spores and dust (and dust mite) that we use to make the allergy extract?  The pollens are collected from cultivated fields, green houses, or from nature by vacuum collection or a drying process. The pollens are then filtered extensively to isolate a single pollen and to remove contaminants. The animal dander is collected from the pelt and skin of healthy animals. Mold spores and dust mite allergens are cultivated in controlled conditions in laboratories. The industry is rigorously monitored and each allergen is standardized to minimize variations. This is important to insure a constant potency. Finally, each allergen batch is tested before being approved for human use. A Note on Food Allergies  Immunotherapy to food allergies has not been proven safe and effective. Therefore, foods will not be put in your allergy injections. The best treatment for food allergies is avoidance of that food. Allergy Extracts  The allergy extract or antigen is the mix that you receive in your allergy injections. It is not a serum. Our provider individualizes each extract for the patient. It is reflective of your skin test results, history and environmental exposures. Our providers review the results of your skin testing along with your allergy history to generate a formula or recipe for your allergy extract. Each extract is formulated in a maximum concentration of 1:20 (allergen: diluent). From this 1:20 concentration a series of dilutions are made, each 10 times weaker than the previous, until the starting dilution is reached. Your starting dilution is determined by the provider and is dependent on sensitivity, history and the season in which you are initiating allergy injections. Each dilution is color coded for  easy identification. Top Color Concentration  Silver top 1:10,000   Green top 1:1,000  Blue top 1:100  Yellow top 1:10  Red top 1:1 (maintenance)    Most patients will start their injections in the 1:10,000 dilutions, and progress up from there in a stepwise fashion.  A minimum of ten shots of a pre-determined amount is given from each dilution, starting with a small progressing through a dilution, and if so, we will slow the progression and advance you  slower by either repeating a dose or advancing in smaller amounts. Local reactions of bumps bigger than a dime in size or an uncomfortable amount of itching are required to be reported to the injection personnel before you leave the office. If the reaction gets larger or itchier after leaving the office, please report the final size to the injection personnel before your next  injection. Systemic Reaction  This reaction is life threatening. Almost all systemic reactions occur within in the first 30-minutes of receiving an allergy injection, hence the reason for the mandatory 30-minute wait in our office or any medical facility after receiving an allergy  injection. A systemic reaction may occur at any time during your allergy treatment. Even with being on your maintenance dose for several years, it is still possible (and does occur) for a systemic reaction to occur. A systemic reaction may have any or all of the following:   Sneezing   Runny and/or itchy nose   Nasal congestion   Itchy and/or watery eyes   Itchy ears or palate (roof of the mouth)   Coughing   Shortness of breath or wheezing   Fainting   Dizziness   Itchy throat   Throat tightness   Hives or itchy skin   Flushing of the skin    If these symptoms develop in our office, please tell the injection personnel or nearest employee immediately. If these symptoms develop outside our office, you will require treatment at the nearest emergency facility immediately. USE YOUR EPIPEN OR AUVI-Q IF NECESSARY. A systemic reaction will necessitate a reduction of one full dilution in your allergy injections. Delayed Reactions  Not all reactions occur within the 30-minute waiting period. You may have a delayed reaction (local or systemic) where symptoms can develop up to 24 hours later. These reactions must be reported to us before your next injection.  They are as important as reactions that occur in the office. Please make it a habit to recheck your injection site throughout the day and report any bumps that develop. (See Local and Systemic Reactions above). It's Not a Race! Immunotherapy (allergy injections) is an important medical treatment for what can be a very serious disease. Over 25% of the population is estimated to have allergic rhinitis (incidentally, 35% of these also have asthma). Studies indicate 1.5 million school days are lost each year and close to 3.4 million work days due to allergies. Prescription medications are conservatively estimated at over $5 billion dollars annually, and the annual loss in work productivity is estimated at  many millions of dollars. Immunotherapy will greatly impact these statistics, improving quality of life, decreasing overall medication costs, and improving productivity at work and school. However, we must caution you that this is not a race! Do NOT blindly proceed with the aim of the maximum concentration (1:20) as your goal. Your goal (and ours) is safety first and then symptom relief. All patients will not be able to obtain the maximum concentration level of 1:20; some sensitive patients may have a lower maintenance concentration. The maintenance concentration (the dose at which the body is most comfortable and doesn't produce repeated severe reactions) will be individualized and adjusted as tolerated. Your maintenance concentration may be lower than the maximum concentration of 1:20 if you are a sensitive individual. A majority will obtain the 1:20 concentration after about 3-4 months of twice weekly injections. Some may never reach that level. These are generalities; you will benefit from your allergy injections at any concentration. Our goal is to get you safely to your maintenance concentration to effectively reduce your allergy symptoms. For your safety:    Accurately report all reactions, local and systemic.    Always wait the mandatory 30-minute waiting period after an injection. Are Your Allergy Injections Helping? Take a mini self-assessment test. To assess whether or not your allergy injections are beneficial, recall your symptoms or the condition that brought you to our office. For some, this may have been frequent respiratory, sinus or ear infections,  uncontrollable asthma or uncontrollable allergy symptoms (sneezing, itchy eyes, nose or palate, runny nose, watery eyes, etc.) Reassess those symptoms now; usually some improvement will have occurred in about 6-12 months. Ask yourself:   Are my allergy symptoms better?  Am I using less medication?  Am I having less frequent sinus, ear or respiratory infections?  Is my asthma under better control?  Do I feel better?  If I miss my allergy injection, do I feel worse? If you've answered yes to any of these questions, you are starting to exhibit improvement from your allergy injections. Assessing for improvement can also be accomplished by reviewing your symptom changes over the years you've been on allergy injections. Compare apples to apples when assessing for improvement. Specifically, compare same seasons (i.e. spring to spring). Compare the spring before starting allergy injections to the subsequent springs after starting allergy injections. Your own symptoms vary from season to season so comparing fall to spring will be misleading. Frequently Asked Questions  1. Do I need to be tested again, and if so how often? Your providers may retest every 3 years. This allows for adjustment of your allergy extract. New allergies can develop and these may need to be added. 2. What do I do when I'm gone on vacations or business travel? Consistency is the one most important aspect of your injections. The more consistent you are with injections the better the response you will receive.  When going on vacation or business trips try as much as possible to get an injection just prior to leaving, and as soon as possible upon return. For most short trips (< 21 days) allergy injections will be minimally affected. 3. Should I receive an injection if I'm sick? If you are ill enough not to go to work or school, postpone your shot. If you are running a fever or having asthma symptoms, do not get an injection until these symptoms have completely resolved. You may receive an injection if you are on an antibiotic or prednisone as long as any fever and/or asthma symptoms have resolved completely. 4. Should I tell my other providers that I'm on allergy injections? Yes, even though this is not a medication. There are medications that should not be given with allergy injections, so please always list your allergy injections on medication forms or whenever asked about medications. 5. How long will I be on allergy injections. The average is 3-5 years. Remaining consistent (weekly) with injections will help achieve a quicker response. Those who have few allergies may respond faster than those who have many allergies. Do not stop the injections on your own; if concerns arise please contact our office to discuss them. 6. What to do if my son/daughter is going away to college and they are on allergy injections? Allergy injections are given at most colleges. Frequently, the student health department will give allergy injections. We will send the allergy extract on to them with complete instructions. If the institution has any questions, they are instructed to contact us before giving any allergy injection. We will then see the student for regular checkups during school breaks when they are home. 7. What do I do if I'm moving? If you are moving, you will need to contact an allergist local to the area in which you are moving. We will need their contact information to transfer your care. We will mail allergy extract to your new provider.   8. Can I, or a friend, give myself injections at home? No. This is a treatment with serious implications. Should you have a reaction you would not be able to give yourself CPR or drive yourself to an emergency room or even call for help. Injections are only given in a facility with a medical provider available. 9. How do I reorder antigen when it is all gone or ? If you receive your injections in our office, we will reorder as necessary. If you are receiving your injections at another office, see the section For Patients Receiving Allergy Shots Outside [address] [place] [address]. 10. What if I am, or want to become, pregnant? Notify us immediately, if you are or think you are pregnant. During the course of your pregnancy we prefer you stop injections. .  11. Can I get a shot if I have a rash? It may depend on the rash and location of the rash. If there is a new rash on the arms we won't be able to give you an injection. If this is a chronic condition and hasn't changed or doesn't involve the arms you can proceed with injections. In general, all new rashes (or hives) will have to be assessed on a case-by-case basis. Please feel free to call our office to discuss. 12. I always get a bump after my shot, is this significant? Yes, ALL bumps are significant. We need to know how soon after your shot they occurred, how big the final size was before they resolved, and in fact did they resolve? This is your body's warning signal. Please do not do yourself a disservice by not reporting these bumps.     PLEASE NOTIFY US IMMEDIATELY IF YOU ON THE FOLLOWING:  Beta Blocker Medications  Brand Generic  Betapace     Acebutolol  Betapace AF    Atenolol  Blocadren   Betaxolol  Brevibloc   Bisprolol   Cartrol   Carteolol  Corzide   Esmolol  Inderal    Lebetalol  Inderal LA   Metoprolol  InnoPran XL   Pindolol   Kerlone   Propranolol  Nadolol   Sotalol   Sectral    Timolol   Tenorectic  Tenormin  Timolide  Toprol-XL   Zebeta   Ziac    Opthalmic Preparations  Betaxon  Betimol  Betoptic  Cosopt  Timoptic  Timoptic XE    MAO Inhibitors  Nardil . .......................................................... Sourav Steve Phenelzine sulfate  Parnate . ....................................................... Sourav Steve Tranylcpromine sulfate        Spent 45 minutes of face-to-face time with the patient with well more than half of the visit being dedicated to the discussion of the various symptom problems, provided education of medications and disease process, as well as discussion of a therapeutic plan for each.     (Please note that portions of this note may have been completed with a voice recognition program.  Efforts were made to edit the dictation but occasionally words are mis-transcribed.)         Signed:  ANDERSON Milligan CNP  11/11/2020  11:39 AM

## 2020-11-11 NOTE — PATIENT INSTRUCTIONS
Patient Education        Allergy Shots: Care Instructions  Your Care Instructions     When you get an allergy shot, your allergist or doctor injects small doses of substances that you are allergic to (allergens) under your skin. This helps your body \"get used to\" the allergen, which can reduce or prevent symptoms. At first, you may need to get allergy shots once a week and then once a month. It may take up to a full year of shots before you see any change in your symptoms. The allergy shot may cause mild problems, such as soreness, redness, warmth, or swelling on the arm where you got the shot. It may also cause itching, hives, or a rash that spreads to other parts of your body. Follow-up care is a key part of your treatment and safety. Be sure to make and go to all appointments, and call your doctor if you are having problems. It's also a good idea to know your test results and keep a list of the medicines you take. How can you care for yourself at home? · Do not smoke or allow others to smoke around you. Smoking makes allergies worse. If you need help quitting, talk to your doctor about stop-smoking programs and medicines. These can increase your chances of quitting for good. · If there is a lot of pollution, pollen, or dust outside, stay inside and keep the windows closed. Use an air conditioner when it's hot outside, and use an air filter in your home. · If dust or dust mites trigger your asthma, decrease the dust around your bed:  ? Wash sheets, pillowcases, and other bedding in hot water every week. ? Use dust-proof covers for pillows, duvets, and mattresses. Avoid plastic covers, because they tear easily and do not \"breathe. \" Wash as instructed on the label. ? Do not use any blankets and pillows that you do not need. ? Use blankets that you can wash in your washing machine. ? Consider removing drapes and carpets, which attract and hold dust, from your bedroom.   · If mold triggers your allergies, get rid of furniture, rugs, and drapes that smell musty. Check for mold under sinks and in the bathroom, attic, and basement. Use a dehumidifier to control mold in these areas. · If pet dander triggers your allergies, keep pets outside or out of your bedroom. Old carpet and cloth furniture can hold a lot of animal dander. You may need to replace them. · If your allergies are triggered by cold air, wear a scarf around your face, and breathe through your nose. · Avoid colds and flu. Get a pneumococcal vaccine shot. If you have had one before, ask your doctor whether you need another dose. Get a flu vaccine every year. If you must be around people with colds or the flu, wash your hands often. When should you call for help? Give an epinephrine shot if:    · You think you are having a severe allergic reaction. After giving an epinephrine shot call 911, even if you feel better. Call 911 if:    · You have symptoms of a severe allergic reaction. These may include:  ? Sudden raised, red areas (hives) all over your body. ? Swelling of the throat, mouth, lips, or tongue. ? Trouble breathing. ? Passing out (losing consciousness). Or you may feel very lightheaded or suddenly feel weak, confused, or restless.     · You have been given an epinephrine shot, even if you feel better. Call your doctor now or seek immediate medical care if:    · You have symptoms of an allergic reaction, such as:  ? A rash or hives (raised, red areas on the skin). ? Itching. ? Swelling. ? Belly pain, nausea, or vomiting. Watch closely for changes in your health, and be sure to contact your doctor if:    · You do not get better as expected. Where can you learn more? Go to [email or web address]. org and sign in to your [employer] account. Enter B302 in the Andel box to learn more about \"Allergy Shots: Care Instructions. \"     If you do not have an account, please click on the \"Sign Up Now\" link.   Current as of: June 29, 2020               Content Version: 12.6  © 2006-2020 [employer]. Care instructions adapted under license by [facility] ([facility]). If you have questions about a medical condition or this instruction, always ask your healthcare professional. [address] any warranty or liability for your use of this information. Patient Education        omalizumab  Pronunciation:  JOSÉ starkey MAICOL oo mab  Brand:  Xolair  What is the most important information I should know about omalizumab? Some people using omalizumab have had a severe, life-threatening allergic reaction either right after the injection or hours later. An allergic reaction may occur even after using omalizumab regularly for a year or longer. Get emergency medical help if you have signs of an allergic reaction: hives, itching; anxiety or fear; flushing (warmth, redness, or tingly feeling); feeling like you might pass out; chest tightness, wheezing, cough, feeling short of breath, difficult breathing; fast or weak heartbeats; swelling of your face, lips, tongue, or throat. What is omalizumab? Omalizumab is used to treat moderate to severe asthma that is caused by allergies in adults and children who are at least 10years old. Omalizumab is used when asthma symptoms are not controlled by asthma inhaled steroid medicine. Omalizumab is not a rescue medicine for treating an asthma attack. Omalizumab is also used to treat chronic hives (idiopathic urticaria) in adults and children who are at least 15years old, after antihistamines have been tried without success. Omalizumab is not for use in treating other allergies, rashes, or attacks of bronchospasm. Omalizumab may also be used for purposes not listed in this medication guide. What should I discuss with my healthcare provider before using omalizumab? You should not use omalizumab if you are allergic to it.   Tell your doctor if you have any signs of infection (fever, swollen glands, general ill feeling), or if you have ever had:  · any other allergies (foods, pollens, etc);  · allergy shots;  · a severe allergic reaction (anaphylaxis);  · an infection caused by parasites (such as giardia, malaria, leishmaniasis, hookworm, pinworm, toxoplasmosis, and many others);  · a heart attack or stroke;  · cancer; or  · a latex allergy. Using this medicine may increase your risk of certain types of cancers of the breast, skin, prostate, or salivary gland. Talk to your doctor about your individual risk. While you are using omalizumab, you may also have an increased risk of becoming infected with parasites (worms) if you live in or travel to areas where such infections are common. Talk with your doctor about what to look for and how to treat this condition. Some babies born to mothers using omalizumab during pregnancy had low birth weight. However, it is not known whether this was due to omalizumab use or to severe asthma in the mothers. The benefit of treating asthma may outweigh any risks to the baby. Tell your doctor if you are pregnant. If you are pregnant, your name may be listed on a pregnancy registry to track the effects of omalizumab on the baby. It may not be safe to breastfeed while using this medicine. Ask your doctor about any risk. How is omalizumab given? Your doctor may perform an allergy skin test or blood test to make sure this medicine is right for you. Omalizumab is injected under the skin. A healthcare provider will give you this injection every 2 or 4 weeks. Your condition may not improve right away. For best results, keep receiving omalizumab as directed. Talk with your doctor if your symptoms do not improve after a few weeks of treatment. Omalizumab doses are based on weight. Your dose needs may change if you gain or lose weight. If you also use a steroid medication, you should not stop using it suddenly.  Follow your doctor's instructions about tapering your dose. Seek medical attention if your breathing problems get worse quickly, or if you think your asthma medications are not working as well. You may need frequent medical tests, such as allergy tests and lung function tests. Your stools may also need to be checked for parasites, especially if you travel. What happens if I miss a dose? Call your doctor for instructions if you miss an appointment for your omalizumab injection. What happens if I overdose? Seek emergency medical attention or call the Poison Help line at 1-643.705.3385. What should I avoid while using omalizumab? Follow your doctor's instructions about any restrictions on food, beverages, or activity. What are the possible side effects of omalizumab? Some people using omalizumab have had a severe, life-threatening allergic reaction either right after the injection or hours later. Allergic reaction may occur even after using the medication regularly for a year or longer. You will be watched closely for a short time after each injection, to make sure you do not have an allergic reaction to omalizumab. Get emergency medical help if you have signs of an allergic reaction:  · hives, itching;  · anxiety or fear, feeling like you might pass out;  · flushing (warmth, redness, or tingly feeling);  · chest tightness, wheezing, cough, feeling short of breath, difficult breathing;  · fast or weak heartbeats; or  · swelling of your face, lips, tongue, or throat. Call your doctor at once if you have:  · trouble breathing;  · numbness or tingling in your arms or legs;  · fever, muscle pain, and rash within a few days after receiving an injection;  · heart attack symptoms --chest pain or pressure, pain spreading to your jaw or shoulder; or  · signs of a blood clot --sudden numbness or weakness, problems with vision or speech, coughing up blood, swelling or redness in an arm or leg.   Common side effects may include:  · mild rash;  · fever;  · nosebleeds;  · joint pain, bone fractures;  · arm or leg pain;  · nausea, vomiting, stomach pain;  · headache;  · dizziness, feeling tired;  · ear pain; or  · cold symptoms such as stuffy nose, sneezing, sinus pain, cough, sore throat. This is not a complete list of side effects and others may occur. Call your doctor for medical advice about side effects. You may report side effects to FDA at 5-383-ISF-5570. What other drugs will affect omalizumab? Other drugs may affect omalizumab, including prescription and over-the-counter medicines, vitamins, and herbal products. Tell your doctor about all your current medicines and any medicine you start or stop using. Where can I get more information? Your doctor or pharmacist can provide more information about omalizumab. Remember, keep this and all other medicines out of the reach of children, never share your medicines with others, and use this medication only for the indication prescribed. Every effort has been made to ensure that the information provided by [address] is accurate, up-to-date, and complete, but no guarantee is made to that effect. Drug information contained herein may be time sensitive. [employer] information has been compiled for use by healthcare practitioners and consumers in the [place] and therefore [employer] does not warrant that uses outside of the [place] are appropriate, unless specifically indicated otherwise. [facility] drug information does not endorse drugs, diagnose patients or recommend therapy. [facility] drug information is an informational resource designed to assist licensed healthcare practitioners in caring for their patients and/or to serve consumers viewing this service as a supplement to, and not a substitute for, the expertise, skill, knowledge and judgment of healthcare practitioners.  The absence of a warning for a given drug or drug combination in no way should be construed to indicate that the drug or drug combination is safe, effective or appropriate for any given patient. [facility] does not assume any responsibility for any aspect of healthcare administered with the aid of information [facility] provides. The information contained herein is not intended to cover all possible uses, directions, precautions, warnings, drug interactions, allergic reactions, or adverse effects. If you have questions about the drugs you are taking, check with your doctor, nurse or pharmacist.  Copyright 2724-5899 [address]. Version: 5.01. Revision date: 5/29/2019. Care instructions adapted under license by [facility] ([facility]). If you have questions about a medical condition or this instruction, always ask your healthcare professional. [place] [address] any warranty or liability for your use of this information.

## 2020-11-23 ENCOUNTER — NURSE ONLY (OUTPATIENT)
Dept: ALLERGY | Age: 30
End: 2020-11-23
Payer: COMMERCIAL

## 2020-11-23 VITALS
RESPIRATION RATE: 14 BRPM | TEMPERATURE: 97.6 F | SYSTOLIC BLOOD PRESSURE: 110 MMHG | HEART RATE: 66 BPM | DIASTOLIC BLOOD PRESSURE: 70 MMHG

## 2020-11-23 PROCEDURE — 95117 IMMUNOTHERAPY INJECTIONS: CPT | Performed by: NURSE PRACTITIONER

## 2020-11-23 NOTE — PROGRESS NOTES
After consent obtained/verified, allergy injection given in back of R/L arm(s). Documentation of vial injection specific to arm(s) noted on Allergy Immunotherapy Administration Form. Patient waited 30 minutes for observation. Patient started Silver vials tolerated well at 0.05ml without adverse reaction. SHOT REACTION TREATMENT INSTRUCTIONS    During the 30 minute wait after an allergy injection the following symptoms should be reported:    Itching other than at the injection site  Hives or swelling other than at the injection site  Redness other than at the injection site  Difficulty breathing  Chest tightness  Difficulty swallowing  Throat tightness    If these symptoms occur, NOTIFY PROVIDER and the following treatment should be administered:    1. Epinephrine 1:1000 IM - 0.3 ml if > 66 lbs or more, 0.15 ml if 33 - 63 lbs, or 0.1 ml if <33 lbs   2. Diphenhydramine - give all intramuscular:     2 to <6 years (off-label use): 6.25 mg,    6 to <12 years: 12.5 to 25 mg;    ?12 years: 25-50 mg.  3.  Famotidine:  Adults 40 mg oral    Adolescents age 12 years and >88 lbs: 40 mg    Children and Adolescents ? 12years of age: Initial: 0.25 mg/kg/dose   every 12 hours (maximum daily dose: 40 mg/day)    Epi dose may me repeated in 5-15 minutes if adequate resolution of symptoms does not occur    Patient should be observed for at least one hour after final epi dose and must be seen by provider. Patients cannot drive themselves if they have received diphenhydramine.

## 2020-11-25 ENCOUNTER — NURSE ONLY (OUTPATIENT)
Dept: ALLERGY | Age: 30
End: 2020-11-25
Payer: COMMERCIAL

## 2020-11-25 VITALS
TEMPERATURE: 98.8 F | HEART RATE: 70 BPM | SYSTOLIC BLOOD PRESSURE: 108 MMHG | RESPIRATION RATE: 16 BRPM | DIASTOLIC BLOOD PRESSURE: 70 MMHG

## 2020-11-25 PROCEDURE — 95117 IMMUNOTHERAPY INJECTIONS: CPT | Performed by: NURSE PRACTITIONER

## 2020-12-01 ENCOUNTER — HOSPITAL ENCOUNTER (OUTPATIENT)
Dept: AUDIOLOGY | Age: 30
Discharge: HOME | End: 2020-12-01
Payer: COMMERCIAL

## 2020-12-01 ENCOUNTER — NURSE ONLY (OUTPATIENT)
Dept: ALLERGY | Age: 30
End: 2020-12-01
Payer: COMMERCIAL

## 2020-12-01 VITALS
RESPIRATION RATE: 14 BRPM | SYSTOLIC BLOOD PRESSURE: 106 MMHG | DIASTOLIC BLOOD PRESSURE: 70 MMHG | HEART RATE: 66 BPM | TEMPERATURE: 97 F

## 2020-12-01 PROCEDURE — 92567 TYMPANOMETRY: CPT | Performed by: AUDIOLOGIST

## 2020-12-01 PROCEDURE — 95117 IMMUNOTHERAPY INJECTIONS: CPT | Performed by: NURSE PRACTITIONER

## 2020-12-01 PROCEDURE — 92557 COMPREHENSIVE HEARING TEST: CPT | Performed by: AUDIOLOGIST

## 2020-12-01 NOTE — PROGRESS NOTES
AUDIOLOGICAL EVALUATION      REASON FOR TESTING:  Audiometry and tympanometry per the request of Dr. Sheng Tran due to diagnosis of dysfunction of both eustachian tubes. The patient reports bilateral otalgia, hearing loss and pressure- worse for the right ear. She denies any vertigo. OTOSCOPY: WNL for both ears. AUDIOGRAM        Reliability: Good  Audiometer Used:  [id number]    COMMENTS: Normal hearing sensitivity for both ears with the right ear being slightly worse at [facility] and [facility]. A slight air bone gap for these frequencies suggests a conductive component for the right ear. Speech discrimination ability is excellent at 100%, bilaterally. Tympanometry revealed normal peak pressure and normal middle ear compliance for the left ear. Tympanometry revealed negative middle ear pressure (-111 daPa) with normal middle ear compliance for the right ear. RECOMMENDATION(S):   1- Continue care with Dr. Sheng Tran, as scheduled. 2- Repeat audiogram and tympanogram following any medical intervention.

## 2020-12-01 NOTE — PROGRESS NOTES
After consent obtained/verified, allergy injection given in back of R/L arm(s). Documentation of vial injection specific to arm(s) noted on Allergy Immunotherapy Administration Form. Patient waited 30 minutes for observation. Patient tolerated Silver vials well at 0.15ml without adverse reaction. SHOT REACTION TREATMENT INSTRUCTIONS    During the 30 minute wait after an allergy injection the following symptoms should be reported:    Itching other than at the injection site  Hives or swelling other than at the injection site  Redness other than at the injection site  Difficulty breathing  Chest tightness  Difficulty swallowing  Throat tightness    If these symptoms occur, NOTIFY PROVIDER and the following treatment should be administered:    1. Epinephrine 1:1000 IM - 0.3 ml if > 66 lbs or more, 0.15 ml if 33 - 63 lbs, or 0.1 ml if <33 lbs   2. Diphenhydramine - give all intramuscular:     2 to <6 years (off-label use): 6.25 mg,    6 to <12 years: 12.5 to 25 mg;    ?12 years: 25-50 mg.  3.  Famotidine:  Adults 40 mg oral    Adolescents age 12 years and >88 lbs: 40 mg    Children and Adolescents ? 12years of age: Initial: 0.25 mg/kg/dose   every 12 hours (maximum daily dose: 40 mg/day)    Epi dose may me repeated in 5-15 minutes if adequate resolution of symptoms does not occur    Patient should be observed for at least one hour after final epi dose and must be seen by provider. Patients cannot drive themselves if they have received diphenhydramine.

## 2020-12-02 ENCOUNTER — OFFICE VISIT (OUTPATIENT)
Dept: INTERNAL MEDICINE CLINIC | Age: 30
End: 2020-12-02
Payer: COMMERCIAL

## 2020-12-02 VITALS — HEIGHT: 66 IN | TEMPERATURE: 98.3 F | WEIGHT: 159.4 LBS | BODY MASS INDEX: 25.62 KG/M2

## 2020-12-02 PROCEDURE — 97802 MEDICAL NUTRITION INDIV IN: CPT | Performed by: DIETITIAN, REGISTERED

## 2020-12-02 NOTE — PROGRESS NOTES
[address] [place] [address]. [address] [place] [address] [place] [facility], [facility]  [phone] (phone)  [phone] (fax)    Patient Name: Tasneem Benitez. Date of Birth: [id number]. MRN: [id number]      Assessment: Patient is a 35 y.o. female seen for Initial MNT visit for Food allergies. -Nutritionally relevant labs:   Lab Results   Component Value Date/Time    GLUCOSE 92 04/18/2020 03:15 PM    GLUCOSE 57 (L) 11/19/2019 08:45 AM     Results for Josafat Gates (MRN [id number]) as of 12/2/2020 11:01   Ref. Range 10/10/2020 09:48   Allergen, Pork Latest Ref Range: <=0.34 kU/L 2.57 (H)   Barley IgE Latest Ref Range: <=0.34 kU/L 0.38 (H)   Corn IgE Latest Ref Range: <=0.34 kU/L 0.35 (H)   Egg White IgE Latest Ref Range: <=0.34 kU/L 0.35 (H)   Oat Latest Ref Range: <=0.34 kU/L 0.54 (H)   Tomato IgE Latest Ref Range: <=0.34 kU/L 0.69 (H)   Wheat IgE Latest Ref Range: <=0.34 kU/L 0.48 (H)     Per pt - per poke test she also has the following allergies  Additional allergies:  Dairy, Shrimp, pineapple, banana  Borderline allergy:  avocado, soybean. Pt states she has Lost ~10# since October. Household includes 5 children and . She also works full time. Since finding out about her food allergies in the past 1-2 months, some days she may only eat once a day. -Food recall:   Yesterday. She only had melon along with drinking water and fruit juice (minute maid fruit punch). Another meal she has had - Beef sausage and tater tots. Although with reading the label she finds that beef sausage had HFCS. She also has a corn allergy. Pt c/o constipation and takes stool softener as needed. -Impression of Dietary Intake: in general, an \"unhealthy\" diet.     Current Outpatient Medications on File Prior to Visit   Medication Sig Dispense Refill    fexofenadine (ALLEGRA) 180 MG tablet Take 1 tablet by mouth daily 30 tablet 2    montelukast (SINGULAIR) 10 MG tablet Take 1 tablet by mouth nightly 30 tablet 5    budesonide-formoterol (SYMBICORT) 160-4.5 MCG/ACT AERO Inhale 2 puffs into the lungs 2 times daily 1 Inhaler 3    albuterol sulfate HFA (PROVENTIL HFA) 108 (90 Base) MCG/ACT inhaler Inhale 2 puffs into the lungs every 6 hours as needed for Wheezing 1 Inhaler 3    acetaminophen (TYLENOL) 325 MG tablet Take 500 mg by mouth every 6 hours as needed for Pain       PRENATAL VIT-DSS-FE FUM-FA PO Take  by mouth.  EPINEPHrine (EPIPEN 2-NATHAN) 0.3 MG/0.3ML SOAJ injection Inject 0.3 mLs into the muscle once for 1 dose Use as directed for allergic reaction 0.3 mL 1     No current facility-administered medications on file prior to visit. Vitals from current and previous visits:  Temp 98.3 °F (36.8 °C)   Ht 5' 6\" (1.676 m)   Wt 159 lb 6.4 oz (72.3 kg)   BMI 25.73 kg/m²     -Body mass index is 25.73 kg/m². 25-29.9 - Overweight.   -Weight goal: maintain weight. Nutrition Diagnosis:   Food and nutrition-related knowledge deficit related to Lack of previous MNT/currently undergoing MNT as evidenced by Conditions associated with a diagnosis or treatment: Multiple food allergies. Intervention:  -Impression: Encouraged pt to follow the sample menus provided so she does not skip meals during the day. She knows her immune system is not good d/t multiple food allergy and GI issues.    -Instructed the patient on: Gluten free, corn, egg, tomato, soy free guidelines, Dairy free guidelines. No Banana, pineapple and no almonds.    -Handouts given for: Printed Guidelines for the following - Gluten free, Corn allergy, Egg allergy, Egg allergy tips, No tomato sauce recipe, Soy allergy guidelines. Patient Instructions   1.)  Www.foodallergy. org   Www.[employer]. [employer]    2.)  Do recipe searches online - Ex.  Tomato free recipes    3.)  To help with constipation - choose high fiber foods - any legume beans (such as kidney and kaiser and garbanzo), dried fruit, fresh fruit (no banana, pineapple), vegetables (no corn no tomato), nuts (no almonds or soy). 4.)  For your dairy allergy - turn to rice, cashew or coconut milk. 5.)  Take a one a day multivitamin mineral supplement. And make sure this supplement provides 100% Recommended daily intake of Calcium and Vitamin D. Drink calcium & vitamin D fortified orange juice    6.)  Bring a 2 week food log to next dietitian appt. -Nutrition prescription: 1600 calories/day, 70 gms protein    Comprehension verified using teachback method. Monitoring/Evaluation:   -Followup visit: 4 weeks with dietitian.   -Receptiveness to education/goals: Agreeable.  -Evaluation of education: Indicates understanding.  -Readiness to change: contemplation - ambivalent about change following sample menus so not skipping meals. -Expected compliance: fair. Thank you for your referral of this patient. Total time involved in direct patient education: 60 minutes for initial MNT visit.

## 2020-12-02 NOTE — PATIENT INSTRUCTIONS
1. )  Www.foodallergy. org   Www.[employer]. its learning    2.)  Do recipe searches online - Ex. Tomato free recipes    3.)  To help with constipation - choose high fiber foods - any legume beans (such as kidney and kaiser and garbanzo), dried fruit, fresh fruit (no banana, pineapple), vegetables (no corn no tomato), nuts (no almonds or soy). 4.)  For your dairy allergy - turn to rice, cashew or coconut milk. 5.)  Take a one a day multivitamin mineral supplement. And make sure this supplement provides 100% Recommended daily intake of Calcium and Vitamin D. Drink calcium & vitamin D fortified orange juice    6.)  Bring a 2 week food log to next dietitian appt.

## 2020-12-10 ENCOUNTER — NURSE ONLY (OUTPATIENT)
Dept: ALLERGY | Age: 30
End: 2020-12-10
Payer: COMMERCIAL

## 2020-12-10 VITALS
SYSTOLIC BLOOD PRESSURE: 112 MMHG | HEART RATE: 70 BPM | TEMPERATURE: 98.4 F | RESPIRATION RATE: 14 BRPM | DIASTOLIC BLOOD PRESSURE: 72 MMHG

## 2020-12-10 PROCEDURE — 95117 IMMUNOTHERAPY INJECTIONS: CPT | Performed by: NURSE PRACTITIONER

## 2020-12-17 ENCOUNTER — NURSE ONLY (OUTPATIENT)
Dept: ALLERGY | Age: 30
End: 2020-12-17
Payer: COMMERCIAL

## 2020-12-17 VITALS
RESPIRATION RATE: 14 BRPM | HEART RATE: 66 BPM | SYSTOLIC BLOOD PRESSURE: 112 MMHG | DIASTOLIC BLOOD PRESSURE: 70 MMHG | TEMPERATURE: 98.1 F

## 2020-12-17 PROCEDURE — 95117 IMMUNOTHERAPY INJECTIONS: CPT | Performed by: NURSE PRACTITIONER

## 2020-12-23 ENCOUNTER — NURSE ONLY (OUTPATIENT)
Dept: ALLERGY | Age: 30
End: 2020-12-23
Payer: COMMERCIAL

## 2020-12-23 VITALS
TEMPERATURE: 97.7 F | DIASTOLIC BLOOD PRESSURE: 70 MMHG | SYSTOLIC BLOOD PRESSURE: 108 MMHG | RESPIRATION RATE: 14 BRPM | HEART RATE: 66 BPM

## 2020-12-23 PROCEDURE — 95117 IMMUNOTHERAPY INJECTIONS: CPT | Performed by: NURSE PRACTITIONER

## 2020-12-23 NOTE — PROGRESS NOTES
After consent obtained/verified, allergy injection given in back of R/L arm(s). Documentation of vial injection specific to arm(s) noted on Allergy Immunotherapy Administration Form. Patient waited 30 minutes for observation. Patient tolerated Silver vials well at 0.25ml without adverse reaction. SHOT REACTION TREATMENT INSTRUCTIONS    During the 30 minute wait after an allergy injection the following symptoms should be reported:    Itching other than at the injection site  Hives or swelling other than at the injection site  Redness other than at the injection site  Difficulty breathing  Chest tightness  Difficulty swallowing  Throat tightness    If these symptoms occur, NOTIFY PROVIDER and the following treatment should be administered:    1. Epinephrine 1:1000 IM - 0.3 ml if > 66 lbs or more, 0.15 ml if 33 - 63 lbs, or 0.1 ml if <33 lbs   2. Diphenhydramine - give all intramuscular:     2 to <6 years (off-label use): 6.25 mg,    6 to <12 years: 12.5 to 25 mg;    ?12 years: 25-50 mg.  3.  Famotidine:  Adults 40 mg oral    Adolescents age 12 years and >88 lbs: 40 mg    Children and Adolescents ? 12years of age: Initial: 0.25 mg/kg/dose   every 12 hours (maximum daily dose: 40 mg/day)    Epi dose may me repeated in 5-15 minutes if adequate resolution of symptoms does not occur    Patient should be observed for at least one hour after final epi dose and must be seen by provider. Patients cannot drive themselves if they have received diphenhydramine.

## 2021-01-06 ENCOUNTER — OFFICE VISIT (OUTPATIENT)
Dept: ALLERGY | Age: 31
End: 2021-01-06
Payer: COMMERCIAL

## 2021-01-06 ENCOUNTER — NURSE ONLY (OUTPATIENT)
Dept: ALLERGY | Age: 31
End: 2021-01-06
Payer: COMMERCIAL

## 2021-01-06 VITALS
TEMPERATURE: 97.2 F | SYSTOLIC BLOOD PRESSURE: 110 MMHG | DIASTOLIC BLOOD PRESSURE: 72 MMHG | HEART RATE: 70 BPM | RESPIRATION RATE: 16 BRPM

## 2021-01-06 VITALS
TEMPERATURE: 97.2 F | BODY MASS INDEX: 25.18 KG/M2 | DIASTOLIC BLOOD PRESSURE: 70 MMHG | HEART RATE: 66 BPM | SYSTOLIC BLOOD PRESSURE: 110 MMHG | WEIGHT: 156 LBS | RESPIRATION RATE: 16 BRPM

## 2021-01-06 DIAGNOSIS — Z51.6 DESENSITIZATION TO ALLERGY SHOT: ICD-10-CM

## 2021-01-06 DIAGNOSIS — J30.81 ANIMAL DANDER ALLERGY: ICD-10-CM

## 2021-01-06 DIAGNOSIS — J45.52 SEVERE PERSISTENT ASTHMA WITH STATUS ASTHMATICUS: ICD-10-CM

## 2021-01-06 DIAGNOSIS — L50.1 CHRONIC IDIOPATHIC URTICARIA: Primary | ICD-10-CM

## 2021-01-06 DIAGNOSIS — J30.81 CAT ALLERGIES: ICD-10-CM

## 2021-01-06 DIAGNOSIS — J30.9 ALLERGIC SINUSITIS: ICD-10-CM

## 2021-01-06 DIAGNOSIS — Z29.8 PROPHYLACTIC IMMUNOTHERAPY: ICD-10-CM

## 2021-01-06 DIAGNOSIS — Z91.048 ALLERGY TO MOLD: ICD-10-CM

## 2021-01-06 DIAGNOSIS — J30.1 ALLERGY TO TREES: ICD-10-CM

## 2021-01-06 DIAGNOSIS — R76.8 ELEVATED IGE LEVEL: ICD-10-CM

## 2021-01-06 DIAGNOSIS — Z91.018 FOOD ALLERGY: ICD-10-CM

## 2021-01-06 DIAGNOSIS — J30.1 NON-SEASONAL ALLERGIC RHINITIS DUE TO POLLEN: ICD-10-CM

## 2021-01-06 DIAGNOSIS — L20.84 INTRINSIC ATOPIC DERMATITIS: ICD-10-CM

## 2021-01-06 DIAGNOSIS — J45.40 MODERATE PERSISTENT ASTHMA, UNSPECIFIED WHETHER COMPLICATED: ICD-10-CM

## 2021-01-06 DIAGNOSIS — Z91.038 ALLERGY TO COCKROACHES: ICD-10-CM

## 2021-01-06 PROCEDURE — 95117 IMMUNOTHERAPY INJECTIONS: CPT | Performed by: NURSE PRACTITIONER

## 2021-01-06 PROCEDURE — G8417 CALC BMI ABV UP PARAM F/U: HCPCS | Performed by: NURSE PRACTITIONER

## 2021-01-06 PROCEDURE — 99215 OFFICE O/P EST HI 40 MIN: CPT | Performed by: NURSE PRACTITIONER

## 2021-01-06 PROCEDURE — G8427 DOCREV CUR MEDS BY ELIG CLIN: HCPCS | Performed by: NURSE PRACTITIONER

## 2021-01-06 PROCEDURE — G8484 FLU IMMUNIZE NO ADMIN: HCPCS | Performed by: NURSE PRACTITIONER

## 2021-01-06 PROCEDURE — 1036F TOBACCO NON-USER: CPT | Performed by: NURSE PRACTITIONER

## 2021-01-06 RX ORDER — ACETAMINOPHEN AND CODEINE PHOSPHATE 120; 12 MG/5ML; MG/5ML
SOLUTION ORAL
COMMUNITY
Start: 2020-12-13 | End: 2021-03-22

## 2021-01-06 RX ORDER — FEXOFENADINE HCL 180 MG/1
180 TABLET ORAL DAILY
COMMUNITY
End: 2021-01-12 | Stop reason: SDUPTHER

## 2021-01-06 RX ORDER — PREDNISONE 20 MG/1
20 TABLET ORAL 2 TIMES DAILY
Qty: 10 TABLET | Refills: 0 | Status: SHIPPED | OUTPATIENT
Start: 2021-01-06 | End: 2021-01-11

## 2021-01-06 ASSESSMENT — ENCOUNTER SYMPTOMS
SHORTNESS OF BREATH: 1
SINUS PRESSURE: 1
RHINORRHEA: 1

## 2021-01-06 NOTE — PROGRESS NOTES
Patient here today to receive Xolair injection. Patient does not report any previous reaction from Xolair injections. Denies any nausea vomiting fever urticaria or angioedema. Denies any recent exacerbation of asthma or asthma-like symptoms. Patient was explained benefits and potential risks including anaphylaxis to patient. Following obtaining verbal and written consent (on file)  Xolair injection was prepared 30 minutes prior to injection according to manufacture guidelines. LYOPHILIZED POWDER (VIAL); reconstituted with 1.4 ml injectable sterile water per Vial.      Patient has been explained the risk and benefits including delayed anaphylaxis. Patient has EpiPen and understands how to appropriately use. Xolair 150 mg given subcutaneously in    for a total combined dose of 300mg  using a 25-gauge needle and 3 ML syringe. Site injections may include the following sites:  RIGHT  / LEFT UPPER ARM,  RIGHT  / LEFT FRONT OR MIDDLE OF THIGH, OR STOMACH        Xolair  [address] [id number](6)   Lot [id number](5)   Expires 03/2021(2)    Prior to patient receiving Xolair area was cleansed with alcohol swabs. Following the administration no evidence of bleeding or bruising. Patient  observed for 2 hours following first three injections and then 30 minutes following each subsequent administration. No adverse reactions reported. Patient tolerated well without adverse reactions or side effects. Patient to continue to receive Xolair injections monthly. Will report any problems to this office.     Patient to return to clinic monthly for Xolair injections and will follow-up with provider a minimum of every 6 months for evaluation and labs including CMP, CBC with diff, and IGE    SHOT REACTION TREATMENT INSTRUCTIONS    During the 30 minute wait after an allergy injection the following symptoms should be reported:    Itching other than at the injection site  Hives or swelling other than at the injection site Redness other than at the injection site  Difficulty breathing  Chest tightness  Difficulty swallowing  Throat tightness    If these symptoms occur, NOTIFY PROVIDER and the following treatment should be administered:    1. Epinephrine 1:1000 IM - 0.3 ml if > 66 lbs or more, 0.15 ml if 33 - 63 lbs, or 0.1 ml if <33 lbs   2. Diphenhydramine - give all intramuscular:     2 to <6 years (off-label use): 6.25 mg,    6 to <12 years: 12.5 to 25 mg;    ?12 years: 25-50 mg.  3.  Famotidine:  Adults 40 mg oral    Adolescents age 12 years and >88 lbs: 40 mg    Children and Adolescents ? 12years of age: Initial: 0.25 mg/kg/dose   every 12 hours (maximum daily dose: 40 mg/day)    Epipen dose may be repeated in 5-15 minutes if adequate resolution of symptoms does not occur    Patient should be observed for at least one hour after final epi dose and must be seen by provider. Patients cannot drive themselves if they have received diphenhydramine.

## 2021-01-06 NOTE — PROGRESS NOTES
Skin: Positive for rash. Patient has rash on arms, back, trunk, legs, chest   Allergic/Immunologic: Positive for environmental allergies, food allergies and immunocompromised state. All other systems reviewed and are negative.         Past MedicalHistory:    Past Medical History:   Diagnosis Date    Anemia     on iron supplements    Asthma     Albuterol and Advair    Bladder prolapse, female, acquired     Constipation     Headache(784.0)     Interstitial cystitis     Mental disorder     bipolar    Sickle cell anemia ([facility] [address]     Has trait       Past Surgical History:  Past Surgical History:   Procedure Laterality Date    ANKLE SURGERY      bilateral ankle revision    COLONOSCOPY  2013    CYSTOSCOPY  9/28/15    WITH HYDRODISTENTION    DILATION AND CURETTAGE OF UTERUS      for Missed AB    FOOT SURGERY Bilateral 06/22/2016    FOOT SURGERY Left 09/27/2017    osteotomy , gastrocnemius resectopm    FOOT SURGERY Right 03/28/2018    LAPAROSCOPY  7/1/13    OTHER SURGICAL HISTORY Left 02/14/2018    Removal of Screw from Calcaneus Left heel     RI FULL EXCIS 5TH METATARSAL HEAD Right 3/28/2018    MEDIAL CALCANEAL DISPLACEMENT OSTEOTOMY RIGHT FOOT, GASTROCNEMIUS LENGTHENING RIGHT LEG, COTTON OSTEOTOMY FIRST CUNEIFORM RIGHT FOOT WITH FORWEB RIGHT FOOT performed by Danika Nazario DPM at [address] Left 9/27/2017    MEDIAL CALCANEALSLIDE WITH SCREW FIXATION LEFT FOOT, COTTON OSTEOTOMY LEFT FOOT, GASTROC LENGTHENING LEFT LEG performed by Danika Nazario DPM at [address] [place] [address] IMPLANT Left 2/14/2018    REMOVAL OF SCREW DROM CALCANEUS LEFT HEEL performed by Danika Nazario DPM at [address] [place] [address]  2014       Family History:   Family History   Problem Relation Age of Onset    Asthma Mother     Depression Mother     Asthma Father     Heart Disease Maternal Grandmother  High Blood Pressure Maternal Grandmother        Social History:   Social History     Tobacco Use    Smoking status: Never Smoker    Smokeless tobacco: Never Used   Substance Use Topics    Alcohol use: No        Allergies:  Patient has no known allergies. CurrentMedications:     Current Outpatient Medications:     norethindrone (MICRONOR) 0.35 MG tablet, , Disp: , Rfl:     fexofenadine (ALLEGRA) 180 MG tablet, Take 180 mg by mouth daily, Disp: , Rfl:     omalizumab (OMALIZUMAB) 150 MG injection, Inject 300 mg into the skin every 28 days, Disp: 1 each, Rfl: 5    montelukast (SINGULAIR) 10 MG tablet, Take 1 tablet by mouth nightly, Disp: 30 tablet, Rfl: 5    budesonide-formoterol (SYMBICORT) 160-4.5 MCG/ACT AERO, Inhale 2 puffs into the lungs 2 times daily, Disp: 1 Inhaler, Rfl: 3    albuterol sulfate HFA (PROVENTIL HFA) 108 (90 Base) MCG/ACT inhaler, Inhale 2 puffs into the lungs every 6 hours as needed for Wheezing, Disp: 1 Inhaler, Rfl: 3    acetaminophen (TYLENOL) 325 MG tablet, Take 500 mg by mouth every 6 hours as needed for Pain , Disp: , Rfl:     PRENATAL VIT-DSS-FE FUM-FA PO, Take  by mouth.  , Disp: , Rfl:     EPINEPHrine (EPIPEN 2-NATHAN) 0.3 MG/0.3ML SOAJ injection, Inject 0.3 mLs into the muscle once for 1 dose Use as directed for allergic reaction, Disp: 0.3 mL, Rfl: 1      Physical Exam:      Vitals:    Vitals:    01/06/21 1047   BP: 110/70   Pulse: 66   Resp: 16   Temp: 97.2 °F (36.2 °C)       156 lb (70.8 kg)       Temp: 97.2 °F (36.2 °C) I @FLOWSTAT(6)@ IPulse: 66 I @FLOWSTAT(8)@ I BP: 110/70 I @SFRHQA(08)@; @NTMTPF(46)@ I Resp: 16 I @FLOWSTAT(9)@ I   I @FLOWSTAT(10)@ I   I   I   I Facility age limit for growth percentiles is 20 years. I     Facility age limit for growth percentiles is 20 years. Facility age limit for growth percentiles is 20 years. Facility age limit for growth percentiles is 20 years. Facility age limit for growth percentiles is 20 years.     Physical Exam: Physical Exam  Vitals signs and nursing note reviewed. Constitutional:       General: She is in acute distress. Appearance: Normal appearance. She is normal weight. HENT:      Head: Normocephalic and atraumatic. Right Ear: Tympanic membrane, ear canal and external ear normal.      Left Ear: Tympanic membrane, ear canal and external ear normal.      Nose: Nose normal.      Mouth/Throat:      Mouth: Mucous membranes are moist.      Pharynx: Oropharynx is clear. Eyes:      Extraocular Movements: Extraocular movements intact. Conjunctiva/sclera: Conjunctivae normal.      Pupils: Pupils are equal, round, and reactive to light. Neck:      Musculoskeletal: Normal range of motion and neck supple. Cardiovascular:      Rate and Rhythm: Normal rate and regular rhythm. Pulses: Normal pulses. Heart sounds: Normal heart sounds. Pulmonary:      Effort: Pulmonary effort is normal.      Breath sounds: Normal breath sounds. Musculoskeletal: Normal range of motion. Skin:     General: Skin is warm and dry. Capillary Refill: Capillary refill takes less than 2 seconds. Findings: Rash present. Comments: Patient here today in obvious discomfort related to restlessness of wanting to scratch skin. Complains of itching on arms, chest, hands, back, legs. Itching on noted on approximately greater than 30% of body   Neurological:      General: No focal deficit present. Mental Status: She is alert and oriented to person, place, and time. Mental status is at baseline. Psychiatric:         Mood and Affect: Mood normal.         Behavior: Behavior normal.         Thought Content:  Thought content normal.         Judgment: Judgment normal.             DATA:  Lab Review:    CBC:   Lab Results   Component Value Date    WBC 6.1 09/02/2020    RBC 4.94 09/02/2020    RBC 4.49 05/15/2019    HGB 12.3 09/02/2020    HCT 40.9 09/02/2020    MCV 82.8 09/02/2020    MCH 24.9 09/02/2020 MCHC 30.1 09/02/2020    RDW 13.1 05/15/2019     09/02/2020          IgE   Date/Time Value Ref Range Status   08/27/2020 01:10  (H) <101 IU/mL Final     Comment:     [address] [phone]     Immunoglobulin E   Date/Time Value Ref Range Status   08/27/2020 01:10  (H) <=214 kU/L Final     Comment:     REFERENCE INTERVAL: Immunoglobulin E, Serum  Access complete set of age- and/or gender-specific reference  intervals for this test in the [facility] Test Directory  ([facility]). IgG   Date/Time Value Ref Range Status   08/27/2020 01:10 PM 1335 700 - 1600 mg/dL Final     Comment:     [address] [phone]     IgA   Date/Time Value Ref Range Status   08/27/2020 01:10  70 - 400 mg/dL Final     Comment:     [address] [phone]      IgM   Date/Time Value Ref Range Status   08/27/2020 01:10  40 - 230 mg/dL Final     Comment:     [address] [phone]       No results found for: VAL   No results found for: RF       Results for orders placed during the hospital encounter of 08/27/20   XR SINUSES (MIN 3 VIEWS )    Narrative PROCEDURE: XR SINUSES (MIN 3 VIEWS )    CLINICAL INFORMATION: Chronic pansinusitis. Headaches. Pressure above the eyes. COMPARISON: No prior study. TECHNIQUE: [place] Saravia, lateral, and submental vertex views of the paranasal sinuses were obtained. FINDINGS: The frontal sinuses are hypoplastic. All of the other paranasal sinuses are well-developed and well aerated. No mucosal thickening or air-fluid levels are seen. There appears be moderate hypertrophy of the inferior nasal turbinate right side. Cannot exclude rhinitis. Sella turcica is unremarkable. Impression 1. Unremarkable paranasal sinuses. 2. Questionable rhinitis. **This report has been created using voice recognition software. It may contain minor errors which are inherent in voice recognition technology. **    Final report electronically signed by Dr. Aline Banuelos on 8/27/2020 1:09 PM      No results found for this or any previous visit. act SCORE 0    PROCEDURES:      Skin Testing performed on:11/11/2020 patient had  positive Rast test performed on blood test    Assessment/Orders:    Diagnosis Orders   1. Chronic idiopathic urticaria  omalizumab (OMALIZUMAB) 150 MG injection   2. Moderate persistent asthma, unspecified whether complicated  omalizumab (OMALIZUMAB) 150 MG injection   3. Food allergy     4. Animal dander allergy     5. Elevated IgE level     6. Allergy to mold     7. Allergy to cockroaches     8. Allergy to trees     9. Desensitization to allergy shot     10. Intrinsic atopic dermatitis     11. Moderate persistent asthma without complication     12. Non-seasonal allergic rhinitis due to pollen     13. Prophylactic immunotherapy     14. Allergic sinusitis         Plan:  Follow Up:2 months    Patient to start on Xolair for chronic idiopathic urticaria which is uncontrolled with antihistamines, controlled with montelukast, and controlled with topical steroids, and controlled with tacrolimus topical cream  Patient to start on prednisone for uncontrolled asthma  Patient to continue asthmatic medications will follow-up with me and stick with asthma control plan  Patient return to clinic for any further asthma exacerbations or to ER  Patient received first Xolair injection today sample given to clinic  Patient received allergy injections today    Spent 45 minutes of face-to-face time with the patient with well more than half of the visit being dedicated to the discussion of the various symptom problems, provided education of medications and disease process, as well as discussion of a therapeutic plan for each. (Please note that portions of this note may have been completed with a voice recognition program.  Efforts were made to edit the dictation but occasionally words are mis-transcribed.)         Signed:  ANDERSON Gardner CNP  1/6/2021  11:41 AM

## 2021-01-13 RX ORDER — FEXOFENADINE HCL 180 MG/1
180 TABLET ORAL DAILY
Qty: 30 TABLET | Refills: 5 | Status: SHIPPED | OUTPATIENT
Start: 2021-01-13 | End: 2021-02-11 | Stop reason: SDUPTHER

## 2021-01-13 RX ORDER — BUDESONIDE AND FORMOTEROL FUMARATE DIHYDRATE 160; 4.5 UG/1; UG/1
2 AEROSOL RESPIRATORY (INHALATION) 2 TIMES DAILY
Qty: 1 INHALER | Refills: 3 | Status: SHIPPED | OUTPATIENT
Start: 2021-01-13 | End: 2021-02-11 | Stop reason: SDUPTHER

## 2021-01-14 ENCOUNTER — NURSE ONLY (OUTPATIENT)
Dept: ALLERGY | Age: 31
End: 2021-01-14
Payer: COMMERCIAL

## 2021-01-14 VITALS
SYSTOLIC BLOOD PRESSURE: 110 MMHG | DIASTOLIC BLOOD PRESSURE: 70 MMHG | RESPIRATION RATE: 14 BRPM | HEART RATE: 66 BPM | TEMPERATURE: 97 F

## 2021-01-14 DIAGNOSIS — Z91.038 ALLERGY TO COCKROACHES: ICD-10-CM

## 2021-01-14 DIAGNOSIS — J30.1 ALLERGY TO TREES: ICD-10-CM

## 2021-01-14 DIAGNOSIS — Z91.048 ALLERGY TO MOLD: ICD-10-CM

## 2021-01-14 DIAGNOSIS — J30.81 CAT ALLERGIES: ICD-10-CM

## 2021-01-14 DIAGNOSIS — Z51.6 DESENSITIZATION TO ALLERGY SHOT: Primary | ICD-10-CM

## 2021-01-14 PROCEDURE — 95117 IMMUNOTHERAPY INJECTIONS: CPT | Performed by: NURSE PRACTITIONER

## 2021-01-18 ENCOUNTER — NURSE ONLY (OUTPATIENT)
Dept: ALLERGY | Age: 31
End: 2021-01-18
Payer: COMMERCIAL

## 2021-01-18 VITALS
SYSTOLIC BLOOD PRESSURE: 104 MMHG | TEMPERATURE: 97.2 F | DIASTOLIC BLOOD PRESSURE: 68 MMHG | HEART RATE: 70 BPM | RESPIRATION RATE: 14 BRPM

## 2021-01-18 DIAGNOSIS — J30.81 CAT ALLERGIES: ICD-10-CM

## 2021-01-18 DIAGNOSIS — J30.1 ALLERGY TO TREES: ICD-10-CM

## 2021-01-18 DIAGNOSIS — Z91.038 ALLERGY TO COCKROACHES: ICD-10-CM

## 2021-01-18 DIAGNOSIS — Z91.048 ALLERGY TO MOLD: ICD-10-CM

## 2021-01-18 DIAGNOSIS — Z51.6 DESENSITIZATION TO ALLERGY SHOT: Primary | ICD-10-CM

## 2021-01-18 PROCEDURE — 95117 IMMUNOTHERAPY INJECTIONS: CPT | Performed by: NURSE PRACTITIONER

## 2021-01-25 ENCOUNTER — NURSE ONLY (OUTPATIENT)
Dept: ALLERGY | Age: 31
End: 2021-01-25
Payer: COMMERCIAL

## 2021-01-25 VITALS
HEART RATE: 70 BPM | RESPIRATION RATE: 14 BRPM | SYSTOLIC BLOOD PRESSURE: 106 MMHG | TEMPERATURE: 97 F | DIASTOLIC BLOOD PRESSURE: 70 MMHG

## 2021-01-25 DIAGNOSIS — J30.81 CAT ALLERGIES: ICD-10-CM

## 2021-01-25 DIAGNOSIS — Z91.038 ALLERGY TO COCKROACHES: ICD-10-CM

## 2021-01-25 DIAGNOSIS — Z51.6 DESENSITIZATION TO ALLERGY SHOT: Primary | ICD-10-CM

## 2021-01-25 DIAGNOSIS — J30.1 ALLERGY TO TREES: ICD-10-CM

## 2021-01-25 DIAGNOSIS — Z91.048 ALLERGY TO MOLD: ICD-10-CM

## 2021-01-25 PROCEDURE — 95117 IMMUNOTHERAPY INJECTIONS: CPT | Performed by: NURSE PRACTITIONER

## 2021-01-25 NOTE — PROGRESS NOTES
After consent obtained/verified, allergy injection given in back of R/L arm(s). Documentation of vial injection specific to arm(s) noted on Allergy Immunotherapy Administration Form. Patient waited 30 minutes for observation. Patient tolerated Silver vials well at 0.5ml without adverse reaction. SHOT REACTION TREATMENT INSTRUCTIONS    During the 30 minute wait after an allergy injection the following symptoms should be reported:    Itching other than at the injection site  Hives or swelling other than at the injection site  Redness other than at the injection site  Difficulty breathing  Chest tightness  Difficulty swallowing  Throat tightness    If these symptoms occur, NOTIFY PROVIDER and the following treatment should be administered:    1. Epinephrine 1:1000 IM - 0.3 ml if > 66 lbs or more, 0.15 ml if 33 - 63 lbs, or 0.1 ml if <33 lbs   2. Diphenhydramine - give all intramuscular:     2 to <6 years (off-label use): 6.25 mg,    6 to <12 years: 12.5 to 25 mg;    ?12 years: 25-50 mg.  3.  Famotidine:  Adults 40 mg oral    Adolescents age 12 years and >88 lbs: 40 mg    Children and Adolescents ? 12years of age: Initial: 0.25 mg/kg/dose   every 12 hours (maximum daily dose: 40 mg/day)    Epi dose may me repeated in 5-15 minutes if adequate resolution of symptoms does not occur    Patient should be observed for at least one hour after final epi dose and must be seen by provider. Patients cannot drive themselves if they have received diphenhydramine.

## 2021-02-03 ENCOUNTER — NURSE ONLY (OUTPATIENT)
Dept: ALLERGY | Age: 31
End: 2021-02-03
Payer: COMMERCIAL

## 2021-02-03 VITALS
RESPIRATION RATE: 14 BRPM | SYSTOLIC BLOOD PRESSURE: 104 MMHG | DIASTOLIC BLOOD PRESSURE: 68 MMHG | HEART RATE: 70 BPM | TEMPERATURE: 97.2 F

## 2021-02-03 DIAGNOSIS — J30.1 ALLERGY TO TREES: ICD-10-CM

## 2021-02-03 DIAGNOSIS — Z91.048 ALLERGY TO MOLD: ICD-10-CM

## 2021-02-03 DIAGNOSIS — J30.81 CAT ALLERGIES: ICD-10-CM

## 2021-02-03 DIAGNOSIS — Z51.6 DESENSITIZATION TO ALLERGY SHOT: ICD-10-CM

## 2021-02-03 DIAGNOSIS — Z91.038 ALLERGY TO COCKROACHES: ICD-10-CM

## 2021-02-03 DIAGNOSIS — Z51.6 ENCOUNTER FOR DESENSITIZATION TO ALLERGENS: Primary | ICD-10-CM

## 2021-02-03 PROCEDURE — 95117 IMMUNOTHERAPY INJECTIONS: CPT | Performed by: NURSE PRACTITIONER

## 2021-02-03 NOTE — PROGRESS NOTES
After consent obtained/verified, allergy injection given in back of R/L arm(s). Documentation of vial injection specific to arm(s) noted on Allergy Immunotherapy Administration Form. Patient waited 30 minutes for observation. Patient tolerated Silver vials well at 0.40ml without adverse reaction. SHOT REACTION TREATMENT INSTRUCTIONS    During the 30 minute wait after an allergy injection the following symptoms should be reported:    Itching other than at the injection site  Hives or swelling other than at the injection site  Redness other than at the injection site  Difficulty breathing  Chest tightness  Difficulty swallowing  Throat tightness    If these symptoms occur, NOTIFY PROVIDER and the following treatment should be administered:    1. Epinephrine 1:1000 IM - 0.3 ml if > 66 lbs or more, 0.15 ml if 33 - 63 lbs, or 0.1 ml if <33 lbs   2. Diphenhydramine - give all intramuscular:     2 to <6 years (off-label use): 6.25 mg,    6 to <12 years: 12.5 to 25 mg;    ?12 years: 25-50 mg.  3.  Famotidine:  Adults 40 mg oral    Adolescents age 12 years and >88 lbs: 40 mg    Children and Adolescents ? 12years of age: Initial: 0.25 mg/kg/dose   every 12 hours (maximum daily dose: 40 mg/day)    Epi dose may me repeated in 5-15 minutes if adequate resolution of symptoms does not occur    Patient should be observed for at least one hour after final epi dose and must be seen by provider. Patients cannot drive themselves if they have received diphenhydramine.

## 2021-02-08 ENCOUNTER — TELEPHONE (OUTPATIENT)
Dept: ALLERGY | Age: 31
End: 2021-02-08

## 2021-02-11 DIAGNOSIS — J45.40 MODERATE PERSISTENT ASTHMA, UNSPECIFIED WHETHER COMPLICATED: Primary | ICD-10-CM

## 2021-02-11 RX ORDER — BUDESONIDE AND FORMOTEROL FUMARATE DIHYDRATE 160; 4.5 UG/1; UG/1
2 AEROSOL RESPIRATORY (INHALATION) 2 TIMES DAILY
Qty: 1 INHALER | Refills: 3 | Status: SHIPPED | OUTPATIENT
Start: 2021-02-11 | End: 2021-03-22 | Stop reason: SDUPTHER

## 2021-02-11 RX ORDER — FEXOFENADINE HCL 180 MG/1
180 TABLET ORAL DAILY
Qty: 30 TABLET | Refills: 5 | Status: SHIPPED | OUTPATIENT
Start: 2021-02-11 | End: 2021-03-22

## 2021-02-18 ENCOUNTER — NURSE ONLY (OUTPATIENT)
Dept: ALLERGY | Age: 31
End: 2021-02-18
Payer: COMMERCIAL

## 2021-02-18 VITALS
TEMPERATURE: 96.7 F | SYSTOLIC BLOOD PRESSURE: 104 MMHG | HEART RATE: 68 BPM | DIASTOLIC BLOOD PRESSURE: 70 MMHG | RESPIRATION RATE: 14 BRPM

## 2021-02-18 DIAGNOSIS — J30.1 ALLERGY TO TREES: ICD-10-CM

## 2021-02-18 DIAGNOSIS — J30.81 ANIMAL DANDER ALLERGY: ICD-10-CM

## 2021-02-18 DIAGNOSIS — Z51.6 ENCOUNTER FOR DESENSITIZATION TO ALLERGENS: ICD-10-CM

## 2021-02-18 DIAGNOSIS — J45.52 SEVERE PERSISTENT ASTHMA WITH STATUS ASTHMATICUS: Primary | ICD-10-CM

## 2021-02-18 DIAGNOSIS — J30.81 CAT ALLERGIES: ICD-10-CM

## 2021-02-18 DIAGNOSIS — Z91.048 ALLERGY TO MOLD: ICD-10-CM

## 2021-02-18 DIAGNOSIS — Z91.038 ALLERGY TO COCKROACHES: ICD-10-CM

## 2021-02-18 PROCEDURE — 96401 CHEMO ANTI-NEOPL SQ/IM: CPT | Performed by: NURSE PRACTITIONER

## 2021-02-18 PROCEDURE — 95117 IMMUNOTHERAPY INJECTIONS: CPT | Performed by: NURSE PRACTITIONER

## 2021-02-18 NOTE — PROGRESS NOTES
After consent obtained/verified, allergy injection given in back of R/L arm(s). Documentation of vial injection specific to arm(s) noted on Allergy Immunotherapy Administration Form. Patient waited 30 minutes for observation. Patient tolerated Silver vials well at 0.35ml without adverse reaction. SHOT REACTION TREATMENT INSTRUCTIONS    During the 30 minute wait after an allergy injection the following symptoms should be reported:    Itching other than at the injection site  Hives or swelling other than at the injection site  Redness other than at the injection site  Difficulty breathing  Chest tightness  Difficulty swallowing  Throat tightness    If these symptoms occur, NOTIFY PROVIDER and the following treatment should be administered:    1. Epinephrine 1:1000 IM - 0.3 ml if > 66 lbs or more, 0.15 ml if 33 - 63 lbs, or 0.1 ml if <33 lbs   2. Diphenhydramine - give all intramuscular:     2 to <6 years (off-label use): 6.25 mg,    6 to <12 years: 12.5 to 25 mg;    ?12 years: 25-50 mg.  3.  Famotidine:  Adults 40 mg oral    Adolescents age 12 years and >88 lbs: 40 mg    Children and Adolescents ? 12years of age: Initial: 0.25 mg/kg/dose   every 12 hours (maximum daily dose: 40 mg/day)    Epi dose may me repeated in 5-15 minutes if adequate resolution of symptoms does not occur    Patient should be observed for at least one hour after final epi dose and must be seen by provider. Patients cannot drive themselves if they have received diphenhydramine.
Hives or swelling other than at the injection site  Redness other than at the injection site  Difficulty breathing  Chest tightness  Difficulty swallowing  Throat tightness    If these symptoms occur, NOTIFY PROVIDER and the following treatment should be administered:    1. Epinephrine 1:1000 IM - 0.3 ml if > 66 lbs or more, 0.15 ml if 33 - 63 lbs, or 0.1 ml if <33 lbs   2. Diphenhydramine - give all intramuscular:     2 to <6 years (off-label use): 6.25 mg,    6 to <12 years: 12.5 to 25 mg;    ?12 years: 25-50 mg.  3.  Famotidine:  Adults 40 mg oral    Adolescents age 12 years and >88 lbs: 40 mg    Children and Adolescents ? 12years of age: Initial: 0.25 mg/kg/dose   every 12 hours (maximum daily dose: 40 mg/day)    Epipen dose may be repeated in 5-15 minutes if adequate resolution of symptoms does not occur    Patient should be observed for at least one hour after final epi dose and must be seen by provider. Patients cannot drive themselves if they have received diphenhydramine.

## 2021-02-24 ENCOUNTER — TELEPHONE (OUTPATIENT)
Dept: ALLERGY | Age: 31
End: 2021-02-24

## 2021-02-24 ENCOUNTER — NURSE ONLY (OUTPATIENT)
Dept: ALLERGY | Age: 31
End: 2021-02-24
Payer: COMMERCIAL

## 2021-02-24 VITALS
HEART RATE: 66 BPM | TEMPERATURE: 97 F | SYSTOLIC BLOOD PRESSURE: 108 MMHG | DIASTOLIC BLOOD PRESSURE: 70 MMHG | RESPIRATION RATE: 14 BRPM

## 2021-02-24 DIAGNOSIS — Z91.038 ALLERGY TO COCKROACHES: ICD-10-CM

## 2021-02-24 DIAGNOSIS — J30.81 CAT ALLERGIES: ICD-10-CM

## 2021-02-24 DIAGNOSIS — Z91.048 ALLERGY TO MOLD: ICD-10-CM

## 2021-02-24 DIAGNOSIS — Z51.6 ENCOUNTER FOR DESENSITIZATION TO ALLERGENS: Primary | ICD-10-CM

## 2021-02-24 DIAGNOSIS — J45.52 SEVERE PERSISTENT ASTHMA WITH STATUS ASTHMATICUS: ICD-10-CM

## 2021-02-24 DIAGNOSIS — J30.1 ALLERGY TO TREES: ICD-10-CM

## 2021-02-24 DIAGNOSIS — J30.81 ANIMAL DANDER ALLERGY: ICD-10-CM

## 2021-02-24 PROCEDURE — 95117 IMMUNOTHERAPY INJECTIONS: CPT | Performed by: NURSE PRACTITIONER

## 2021-02-24 NOTE — TELEPHONE ENCOUNTER
On 03/05/21 we can call [address] to setup delivery for the [facility] [place].

## 2021-03-10 ENCOUNTER — NURSE ONLY (OUTPATIENT)
Dept: ALLERGY | Age: 31
End: 2021-03-10
Payer: COMMERCIAL

## 2021-03-10 ENCOUNTER — OFFICE VISIT (OUTPATIENT)
Dept: ENT CLINIC | Age: 31
End: 2021-03-10
Payer: COMMERCIAL

## 2021-03-10 VITALS
HEART RATE: 65 BPM | SYSTOLIC BLOOD PRESSURE: 108 MMHG | WEIGHT: 156 LBS | TEMPERATURE: 97.3 F | OXYGEN SATURATION: 100 % | RESPIRATION RATE: 14 BRPM | DIASTOLIC BLOOD PRESSURE: 68 MMHG | BODY MASS INDEX: 25.18 KG/M2

## 2021-03-10 VITALS
TEMPERATURE: 97.3 F | SYSTOLIC BLOOD PRESSURE: 110 MMHG | DIASTOLIC BLOOD PRESSURE: 70 MMHG | HEART RATE: 66 BPM | RESPIRATION RATE: 14 BRPM

## 2021-03-10 DIAGNOSIS — Z91.038 ALLERGY TO COCKROACHES: ICD-10-CM

## 2021-03-10 DIAGNOSIS — Z91.048 ALLERGY TO MOLD: ICD-10-CM

## 2021-03-10 DIAGNOSIS — J30.1 ALLERGY TO TREES: ICD-10-CM

## 2021-03-10 DIAGNOSIS — J30.1 NON-SEASONAL ALLERGIC RHINITIS DUE TO POLLEN: Primary | ICD-10-CM

## 2021-03-10 DIAGNOSIS — J30.2 SEASONAL ALLERGIC RHINITIS, UNSPECIFIED TRIGGER: ICD-10-CM

## 2021-03-10 DIAGNOSIS — J30.81 ANIMAL DANDER ALLERGY: ICD-10-CM

## 2021-03-10 DIAGNOSIS — J30.81 CAT ALLERGIES: ICD-10-CM

## 2021-03-10 DIAGNOSIS — Z51.6 ENCOUNTER FOR DESENSITIZATION TO ALLERGENS: Primary | ICD-10-CM

## 2021-03-10 DIAGNOSIS — H69.83 DYSFUNCTION OF BOTH EUSTACHIAN TUBES: Primary | ICD-10-CM

## 2021-03-10 PROCEDURE — 99213 OFFICE O/P EST LOW 20 MIN: CPT | Performed by: OTOLARYNGOLOGY

## 2021-03-10 PROCEDURE — G8427 DOCREV CUR MEDS BY ELIG CLIN: HCPCS | Performed by: OTOLARYNGOLOGY

## 2021-03-10 PROCEDURE — G8484 FLU IMMUNIZE NO ADMIN: HCPCS | Performed by: OTOLARYNGOLOGY

## 2021-03-10 PROCEDURE — 95117 IMMUNOTHERAPY INJECTIONS: CPT | Performed by: NURSE PRACTITIONER

## 2021-03-10 PROCEDURE — 1036F TOBACCO NON-USER: CPT | Performed by: OTOLARYNGOLOGY

## 2021-03-10 PROCEDURE — G8417 CALC BMI ABV UP PARAM F/U: HCPCS | Performed by: OTOLARYNGOLOGY

## 2021-03-10 RX ORDER — FLUTICASONE PROPIONATE 50 MCG
1 SPRAY, SUSPENSION (ML) NASAL 2 TIMES DAILY
Qty: 2 BOTTLE | Refills: 5 | Status: SHIPPED | OUTPATIENT
Start: 2021-03-10 | End: 2022-02-17

## 2021-03-10 RX ORDER — PSEUDOEPHEDRINE HYDROCHLORIDE 30 MG/1
30 TABLET ORAL EVERY 6 HOURS PRN
Qty: 120 TABLET | Refills: 5 | Status: SHIPPED | OUTPATIENT
Start: 2021-03-10 | End: 2021-08-26 | Stop reason: SDUPTHER

## 2021-03-10 RX ORDER — LEVOCETIRIZINE DIHYDROCHLORIDE 5 MG/1
5 TABLET, FILM COATED ORAL NIGHTLY
Qty: 30 TABLET | Refills: 11 | Status: SHIPPED | OUTPATIENT
Start: 2021-03-10 | End: 2021-09-22 | Stop reason: SDUPTHER

## 2021-03-10 NOTE — PROGRESS NOTES
Patient has failed therapy with Allegra 180 mg since 11/11/2020 for 30 days. She also failed cetirizine prior to that and did a trial period of 08/10/2020 until 11/11/2020    Patient mentions she has also tried loratadine prior to 2020-08-10 for at least 60 to 90 days at home prior to being seen at this clinic and it also did not proved to be effective in controlling her allergic rhinitis. She is used multiple drying nasal sprays including Flonase and she is currently also adding Sudafed a decongestant to try to help with her allergic rhinitis.      We will now try Xyzal 5 mg daily to see if this helps patient with allergic rhinitis in order to be more effective with her chronic allergic rhinitis symptoms

## 2021-03-10 NOTE — PROGRESS NOTES
every 6 hours as needed for Wheezing 1 Inhaler 3    acetaminophen (TYLENOL) 325 MG tablet Take 500 mg by mouth every 6 hours as needed for Pain       PRENATAL VIT-DSS-FE FUM-FA PO Take  by mouth.         EPINEPHrine (EPIPEN 2-NATHAN) 0.3 MG/0.3ML SOAJ injection Inject 0.3 mLs into the muscle once for 1 dose Use as directed for allergic reaction 0.3 mL 1     Current Facility-Administered Medications   Medication Dose Route Frequency Provider Last Rate Last Admin    omalizumab Floretta Lightning) injection 150 mg  150 mg Subcutaneous Q28 Days Ruthell Nunnery, APRN - CNP   150 mg at 02/18/21 1138    omalizumab (XOLAIR) injection 150 mg  150 mg Subcutaneous Q28 Days Ruthell Nunnery, APRN - CNP   150 mg at 02/18/21 1139    omalizumab (XOLAIR) injection 150 mg  150 mg Subcutaneous Q28 Days Ruthell Nunnery, APRN - CNP   150 mg at 01/06/21 1238    omalizumab (XOLAIR) injection 150 mg  150 mg Subcutaneous Q28 Days Ruthell Nunnery, APRN - CNP   150 mg at 01/06/21 1237     Past Medical History:   Diagnosis Date    Anemia     on iron supplements    Asthma     Albuterol and Advair    Bladder prolapse, female, acquired     Constipation     Headache(784.0)     Interstitial cystitis     Mental disorder     bipolar    Sickle cell anemia ([facility] [address]     Has trait      Past Surgical History:   Procedure Laterality Date    ANKLE SURGERY      bilateral ankle revision    COLONOSCOPY  2013    CYSTOSCOPY  9/28/15    WITH HYDRODISTENTION    DILATION AND CURETTAGE OF UTERUS      for Missed AB    FOOT SURGERY Bilateral 06/22/2016    FOOT SURGERY Left 09/27/2017    osteotomy , gastrocnemius resectopm    FOOT SURGERY Right 03/28/2018    LAPAROSCOPY  7/1/13    OTHER SURGICAL HISTORY Left 02/14/2018    Removal of Screw from Calcaneus Left heel     [place] FULL EXCIS 5TH METATARSAL HEAD Right 3/28/2018    MEDIAL CALCANEAL DISPLACEMENT OSTEOTOMY RIGHT FOOT, GASTROCNEMIUS LENGTHENING RIGHT LEG, COTTON OSTEOTOMY FIRST CUNEIFORM RIGHT FOOT WITH FORWEB RIGHT FOOT performed by Olga Alberts DPM at [address] Left 9/27/2017    MEDIAL CALCANEALSLIDE WITH SCREW FIXATION LEFT FOOT, COTTON OSTEOTOMY LEFT FOOT, GASTROC LENGTHENING LEFT LEG performed by Olga Alberts DPM at [address] [facility] IMPLANT Left 2/14/2018    REMOVAL OF SCREW DROM CALCANEUS LEFT HEEL performed by Olga Alberts DPM at [address] [place] [address] EXTRACTION  2014     Family History   Problem Relation Age of Onset    Asthma Mother     Depression Mother     Asthma Father     Heart Disease Maternal Grandmother     High Blood Pressure Maternal Grandmother      Social History     Tobacco Use    Smoking status: Never Smoker    Smokeless tobacco: Never Used   Substance Use Topics    Alcohol use: No        Subjective:      Review of Systems  Rest of review of systems are negative, except as noted in HPI. Objective:     /68 (Site: Left Upper Arm, Position: Sitting)   Pulse 65   Temp 97.3 °F (36.3 °C) (Infrared)   Resp 14   Wt 156 lb (70.8 kg)   SpO2 100%   BMI 25.18 kg/m²     Physical Exam       On general physical exam the patient is a pleasant alert well oriented and cooperative young adult female in no acute distress. Her nose appears to be congested. Her voice is hyponasal.  I heard no throat clearing coughing or inspiratory stridor. On otoscopy, her right tympanic membrane was mildly retracted. On effortful auto inflation, the patient was able to equalize the pressure on the right side and resuspend her tympanic membrane. An otherwise look normal.  No fluid was seen in the middle ear space based on the secondary light reflection from the cochlear promontory. Vitals reviewed. No results found.    Lab Results   Component Value Date     04/18/2020     11/19/2019     06/13/2019    K 5.0 04/18/2020    K 4.6 11/19/2019    K 4.0 06/13/2019    K 3.8 04/20/2019     04/18/2020  11/19/2019     06/13/2019    CO2 22 04/18/2020    CO2 16 11/19/2019    CO2 19 06/13/2019    BUN 13 04/18/2020    BUN 4 11/19/2019    BUN 7 06/13/2019    CREATININE 0.7 04/18/2020    CREATININE 0.6 11/19/2019    CREATININE 0.5 06/13/2019    CALCIUM 8.8 04/18/2020    CALCIUM 8.8 11/19/2019    CALCIUM 9.0 06/13/2019    PROT 6.6 11/19/2019    PROT 6.9 06/13/2019    PROT 7.1 04/20/2019    LABALBU 3.0 11/19/2019    LABALBU 3.5 06/13/2019    LABALBU 3.7 04/20/2019    BILITOT 0.5 11/19/2019    BILITOT <0.2 06/13/2019    BILITOT 0.2 04/20/2019    ALKPHOS 367 11/19/2019    ALKPHOS 59 06/13/2019    ALKPHOS 60 04/20/2019    AST 42 11/19/2019    AST 26 06/13/2019    AST 15 04/20/2019    ALT 25 11/19/2019    ALT 35 06/13/2019    ALT 13 04/20/2019       All of the past medical history, past surgical history, family history,social history, allergies and current medications were reviewed with the patient. Assessment & Plan   Diagnoses and all orders for this visit:     Diagnosis Orders   1. Dysfunction of both eustachian tubes     2. Seasonal allergic rhinitis, unspecified trigger         Based on the patient's history and physical findings, she has environmental allergies and allergic rhinitis with associated eustachian tube dysfunction. Her right side is her worst of the 2 sides. She needs to auto inflate regularly. I will give her a prescription of Sudafed that she can takes up to 4 times per day to promote decongestion and ease her ability to clear her ears. She is to use it anytime she feels like her ear is becoming plugged. She is also to stay on the Flonase 1 puff each nostril twice a day. As she continues to get her immune therapy from [facility], my expectation is that this process will begin to improve and she will require Sudafed less frequently. I will see her back in approximately 6 months to make sure she is thriving.   If she is doing very well and has no particular changes or needs to address, she can change to as needed follow-up. I explained all of this in detail to the patient to her satisfaction. She reported being pleased with the outcome of her visit and being willing to proceed as such. Return in about 6 months (around 9/10/2021) for Follow-up of care; may change to as needed follow-up if doing very well. .           **This report has been created using voice recognition software. It may contain minor errors which are inherent in voice recognition technology. **

## 2021-03-15 ENCOUNTER — TELEPHONE (OUTPATIENT)
Dept: ALLERGY | Age: 31
End: 2021-03-15

## 2021-03-15 NOTE — TELEPHONE ENCOUNTER
Called patient to inform her that she can call [facility] to setup delivery for [facility]

## 2021-03-22 ENCOUNTER — NURSE ONLY (OUTPATIENT)
Dept: ALLERGY | Age: 31
End: 2021-03-22
Payer: COMMERCIAL

## 2021-03-22 ENCOUNTER — OFFICE VISIT (OUTPATIENT)
Dept: ALLERGY | Age: 31
End: 2021-03-22
Payer: COMMERCIAL

## 2021-03-22 VITALS
RESPIRATION RATE: 16 BRPM | WEIGHT: 148.2 LBS | DIASTOLIC BLOOD PRESSURE: 78 MMHG | HEIGHT: 66 IN | SYSTOLIC BLOOD PRESSURE: 124 MMHG | HEART RATE: 76 BPM | TEMPERATURE: 97.8 F | BODY MASS INDEX: 23.82 KG/M2

## 2021-03-22 VITALS
SYSTOLIC BLOOD PRESSURE: 124 MMHG | TEMPERATURE: 97.8 F | RESPIRATION RATE: 14 BRPM | BODY MASS INDEX: 23.89 KG/M2 | DIASTOLIC BLOOD PRESSURE: 80 MMHG | HEART RATE: 74 BPM | WEIGHT: 148 LBS

## 2021-03-22 DIAGNOSIS — J45.40 MODERATE PERSISTENT ASTHMA, UNSPECIFIED WHETHER COMPLICATED: ICD-10-CM

## 2021-03-22 DIAGNOSIS — L20.84 INTRINSIC ATOPIC DERMATITIS: ICD-10-CM

## 2021-03-22 DIAGNOSIS — R76.8 ELEVATED IGE LEVEL: ICD-10-CM

## 2021-03-22 DIAGNOSIS — J30.1 ALLERGY TO TREES: Primary | ICD-10-CM

## 2021-03-22 DIAGNOSIS — J30.1 ALLERGY TO TREES: ICD-10-CM

## 2021-03-22 DIAGNOSIS — J45.52 SEVERE PERSISTENT ASTHMA WITH STATUS ASTHMATICUS: Primary | ICD-10-CM

## 2021-03-22 DIAGNOSIS — Z91.048 ALLERGY TO MOLD: ICD-10-CM

## 2021-03-22 DIAGNOSIS — Z51.6 ENCOUNTER FOR DESENSITIZATION TO ALLERGENS: ICD-10-CM

## 2021-03-22 DIAGNOSIS — J30.1 NON-SEASONAL ALLERGIC RHINITIS DUE TO POLLEN: ICD-10-CM

## 2021-03-22 DIAGNOSIS — Z91.038 ALLERGY TO COCKROACHES: ICD-10-CM

## 2021-03-22 DIAGNOSIS — L50.1 CHRONIC IDIOPATHIC URTICARIA: ICD-10-CM

## 2021-03-22 DIAGNOSIS — J45.40 MODERATE PERSISTENT ASTHMA WITHOUT COMPLICATION: ICD-10-CM

## 2021-03-22 DIAGNOSIS — Z91.018 FOOD ALLERGY: ICD-10-CM

## 2021-03-22 DIAGNOSIS — Z51.6 DESENSITIZATION TO ALLERGY SHOT: ICD-10-CM

## 2021-03-22 PROCEDURE — 1036F TOBACCO NON-USER: CPT | Performed by: NURSE PRACTITIONER

## 2021-03-22 PROCEDURE — 99214 OFFICE O/P EST MOD 30 MIN: CPT | Performed by: NURSE PRACTITIONER

## 2021-03-22 PROCEDURE — 95117 IMMUNOTHERAPY INJECTIONS: CPT | Performed by: NURSE PRACTITIONER

## 2021-03-22 PROCEDURE — 96372 THER/PROPH/DIAG INJ SC/IM: CPT | Performed by: NURSE PRACTITIONER

## 2021-03-22 PROCEDURE — G8420 CALC BMI NORM PARAMETERS: HCPCS | Performed by: NURSE PRACTITIONER

## 2021-03-22 PROCEDURE — G8484 FLU IMMUNIZE NO ADMIN: HCPCS | Performed by: NURSE PRACTITIONER

## 2021-03-22 PROCEDURE — G8427 DOCREV CUR MEDS BY ELIG CLIN: HCPCS | Performed by: NURSE PRACTITIONER

## 2021-03-22 RX ORDER — MONTELUKAST SODIUM 10 MG/1
10 TABLET ORAL NIGHTLY
Qty: 30 TABLET | Refills: 5 | Status: SHIPPED | OUTPATIENT
Start: 2021-03-22 | End: 2021-09-22 | Stop reason: SDUPTHER

## 2021-03-22 RX ORDER — BUDESONIDE AND FORMOTEROL FUMARATE DIHYDRATE 160; 4.5 UG/1; UG/1
2 AEROSOL RESPIRATORY (INHALATION) 2 TIMES DAILY
Qty: 1 INHALER | Refills: 5 | Status: SHIPPED | OUTPATIENT
Start: 2021-03-22 | End: 2021-09-22 | Stop reason: SDUPTHER

## 2021-03-22 RX ORDER — ALBUTEROL SULFATE 90 UG/1
2 AEROSOL, METERED RESPIRATORY (INHALATION) EVERY 6 HOURS PRN
Qty: 1 INHALER | Refills: 5 | Status: SHIPPED | OUTPATIENT
Start: 2021-03-22 | End: 2021-08-26 | Stop reason: SDUPTHER

## 2021-03-22 ASSESSMENT — ENCOUNTER SYMPTOMS
STRIDOR: 0
COLOR CHANGE: 0
SINUS PRESSURE: 0
VOMITING: 0
COUGH: 0
DIARRHEA: 0
VOICE CHANGE: 0
NAUSEA: 0
RHINORRHEA: 0
FACIAL SWELLING: 0
CHOKING: 0
SORE THROAT: 0
WHEEZING: 0
SHORTNESS OF BREATH: 0
TROUBLE SWALLOWING: 0
APNEA: 0
CHEST TIGHTNESS: 0
ABDOMINAL PAIN: 0

## 2021-03-22 NOTE — PROGRESS NOTES
After consent obtained/verified, allergy injection given in back of R/L arm(s). Documentation of vial injection specific to arm(s) noted on Allergy Immunotherapy Administration Form. Patient signed waiver and declined to wait 30 minutes for observation. Patient tolerated Silver vials well at 0.30ml without adverse reaction. SHOT REACTION TREATMENT INSTRUCTIONS    During the 30 minute wait after an allergy injection the following symptoms should be reported:    Itching other than at the injection site  Hives or swelling other than at the injection site  Redness other than at the injection site  Difficulty breathing  Chest tightness  Difficulty swallowing  Throat tightness    If these symptoms occur, NOTIFY PROVIDER and the following treatment should be administered:    1. Epinephrine 1:1000 IM - 0.3 ml if > 66 lbs or more, 0.15 ml if 33 - 63 lbs, or 0.1 ml if <33 lbs   2. Diphenhydramine - give all intramuscular:     2 to <6 years (off-label use): 6.25 mg,    6 to <12 years: 12.5 to 25 mg;    ?12 years: 25-50 mg.  3.  Famotidine:  Adults 40 mg oral    Adolescents age 12 years and >88 lbs: 40 mg    Children and Adolescents ? 12years of age: Initial: 0.25 mg/kg/dose   every 12 hours (maximum daily dose: 40 mg/day)    Epi dose may me repeated in 5-15 minutes if adequate resolution of symptoms does not occur    Patient should be observed for at least one hour after final epi dose and must be seen by provider. Patients cannot drive themselves if they have received diphenhydramine.

## 2021-03-22 NOTE — PROGRESS NOTES
@[email or web address]@    Allergy & Asthma   [address] [facility], [address]  Ph:   [phone]  Fax:[phone]    Provider:  Dr. Harika Ochoa:   Chief Complaint   Patient presents with    Follow-up     Patient here for 2 month follow up for chronic idopathic urticria. HISTORY OF PRESENT ILLNESS: ESTABLISHED PATIENT HERE FOR EVALUATION   Patient states since starting on allergra her itching has improved and has resolved. Patient also has started on Xolair which she states has helped her remarkably. She no longer has itching. She denies any nausea, vomiting or problems with receiving Xolair. Patient states severity of itching is now mild. She states that the first Xolair injection she still had some itching but after the second 1 she states that she had remarkable provement. She denies any problems with receiving Xolair injections. Patient has had urticaria for several years. She obtained no relief despite using several antihistamines and topical steroid lotions. It was only after she started the Xolair along with Allegra that she obtained relief from the chronic idiopathic urticaria        Review of Systems:  Review of Systems   Constitutional: Negative for activity change, appetite change, chills, diaphoresis, fatigue, fever and unexpected weight change. HENT: Positive for ear pain, nosebleeds and postnasal drip. Negative for congestion, dental problem, ear discharge, facial swelling, hearing loss, mouth sores, rhinorrhea, sinus pressure, sneezing, sore throat, tinnitus, trouble swallowing and voice change. Right ear pain - pressure - sees ENT   Eyes: Negative for visual disturbance. Respiratory: Negative for apnea, cough, choking, chest tightness, shortness of breath, wheezing and stridor. Cardiovascular: Negative for chest pain, palpitations and leg swelling.    Gastrointestinal: Negative for abdominal pain, diarrhea, nausea and vomiting. Endocrine: Negative for cold intolerance, heat intolerance, polydipsia and polyuria. Genitourinary: Negative for difficulty urinating, dysuria, enuresis, hematuria and urgency. Musculoskeletal: Negative for arthralgias, gait problem, neck pain and neck stiffness. Skin: Negative for color change and rash. Allergic/Immunologic: Positive for environmental allergies and food allergies. Negative for immunocompromised state. Neurological: Positive for headaches. Negative for dizziness, syncope, facial asymmetry, speech difficulty and light-headedness. Hematological: Negative for adenopathy. Does not bruise/bleed easily. Psychiatric/Behavioral: Negative for confusion and sleep disturbance. The patient is not nervous/anxious. All other systems reviewed and are negative.         Past MedicalHistory:    Past Medical History:   Diagnosis Date    Anemia     on iron supplements    Asthma     Albuterol and Advair    Bladder prolapse, female, acquired     Constipation     Headache(784.0)     Interstitial cystitis     Mental disorder     bipolar    Sickle cell anemia ([facility] [address]     Has trait       Past Surgical History:  Past Surgical History:   Procedure Laterality Date    ANKLE SURGERY      bilateral ankle revision    COLONOSCOPY  2013    CYSTOSCOPY  9/28/15    WITH HYDRODISTENTION    DILATION AND CURETTAGE OF UTERUS      for Missed AB    FOOT SURGERY Bilateral 06/22/2016    FOOT SURGERY Left 09/27/2017    osteotomy , gastrocnemius resectopm    FOOT SURGERY Right 03/28/2018    LAPAROSCOPY  7/1/13    OTHER SURGICAL HISTORY Left 02/14/2018    Removal of Screw from Calcaneus Left heel     [place] FULL EXCIS 5TH METATARSAL HEAD Right 3/28/2018    MEDIAL CALCANEAL DISPLACEMENT OSTEOTOMY RIGHT FOOT, GASTROCNEMIUS LENGTHENING RIGHT LEG, COTTON OSTEOTOMY FIRST CUNEIFORM RIGHT FOOT WITH FORWEB RIGHT FOOT performed by Damien Steen DPM at [facility] Left 9/27/2017    MEDIAL CALCANEALSLIDE WITH SCREW FIXATION LEFT FOOT, COTTON OSTEOTOMY LEFT FOOT, GASTROC LENGTHENING LEFT LEG performed by Afshin Fontenot DPM at [address] [facility] IMPLANT Left 2/14/2018    REMOVAL OF SCREW DROM CALCANEUS LEFT HEEL performed by Afshin Fontenot DPM at [address] [place] [address]  2014       Family History:   Family History   Problem Relation Age of Onset    Asthma Mother     Depression Mother     Asthma Father     Heart Disease Maternal Grandmother     High Blood Pressure Maternal Grandmother        Social History:   Social History     Tobacco Use    Smoking status: Never Smoker    Smokeless tobacco: Never Used   Substance Use Topics    Alcohol use: No        Allergies:  Patient has no known allergies.     CurrentMedications:     Current Outpatient Medications:     budesonide-formoterol (SYMBICORT) 160-4.5 MCG/ACT AERO, Inhale 2 puffs into the lungs 2 times daily, Disp: 1 Inhaler, Rfl: 5    albuterol sulfate HFA (PROVENTIL HFA) 108 (90 Base) MCG/ACT inhaler, Inhale 2 puffs into the lungs every 6 hours as needed for Wheezing, Disp: 1 Inhaler, Rfl: 5    montelukast (SINGULAIR) 10 MG tablet, Take 1 tablet by mouth nightly, Disp: 30 tablet, Rfl: 5    pseudoephedrine (DECONGESTANT) 30 MG tablet, Take 1 tablet by mouth every 6 hours as needed for Congestion, Disp: 120 tablet, Rfl: 5    fluticasone (FLONASE) 50 MCG/ACT nasal spray, 1 spray by Each Nostril route 2 times daily, Disp: 2 Bottle, Rfl: 5    levocetirizine (XYZAL ALLERGY 24HR) 5 MG tablet, Take 1 tablet by mouth nightly, Disp: 30 tablet, Rfl: 11    omalizumab (OMALIZUMAB) 150 MG injection, Inject 300 mg into the skin every 28 days, Disp: 1 each, Rfl: 5    EPINEPHrine (EPIPEN 2-NATHAN) 0.3 MG/0.3ML SOAJ injection, Inject 0.3 mLs into the muscle once for 1 dose Use as directed for allergic reaction, Disp: 0.3 mL, Rfl: 1    acetaminophen (TYLENOL) 325 MG tablet, Take 500 mg oriented to person, place, and time. Mental status is at baseline. Psychiatric:         Mood and Affect: Mood normal.         Behavior: Behavior normal.         Thought Content: Thought content normal.         Judgment: Judgment normal.             DATA:  Lab Review:    CBC:   Lab Results   Component Value Date    WBC 6.1 09/02/2020    RBC 4.94 09/02/2020    RBC 4.49 05/15/2019    HGB 12.3 09/02/2020    HCT 40.9 09/02/2020    MCV 82.8 09/02/2020    MCH 24.9 09/02/2020    MCHC 30.1 09/02/2020    RDW 13.1 05/15/2019     09/02/2020          IgE   Date/Time Value Ref Range Status   08/27/2020 01:10  (H) <101 IU/mL Final     Comment:     [address] [phone]     Immunoglobulin E   Date/Time Value Ref Range Status   08/27/2020 01:10  (H) <=214 kU/L Final     Comment:     REFERENCE INTERVAL: Immunoglobulin E, Serum  Access complete set of age- and/or gender-specific reference  intervals for this test in the [facility] Test Directory  ([facility]). IgG   Date/Time Value Ref Range Status   08/27/2020 01:10 PM 1335 700 - 1600 mg/dL Final     Comment:     [address] [phone]     IgA   Date/Time Value Ref Range Status   08/27/2020 01:10  70 - 400 mg/dL Final     Comment:     [address] [phone]      IgM   Date/Time Value Ref Range Status   08/27/2020 01:10  40 - 230 mg/dL Final     Comment:     [address] [phone]       No results found for: VAL   No results found for: RF       Results for orders placed during the hospital encounter of 08/27/20   XR SINUSES (MIN 3 VIEWS )    Narrative PROCEDURE: XR SINUSES (MIN 3 VIEWS )    CLINICAL INFORMATION: Chronic pansinusitis. Headaches. Pressure above the eyes. COMPARISON: No prior study.     TECHNIQUE: [place] [employer], lateral, and submental vertex views of

## 2021-03-22 NOTE — PATIENT INSTRUCTIONS
Patient Education        omalizumab  Pronunciation:  OH jaky MILIAN osadaf mab  Brand:  Xolair  What is the most important information I should know about omalizumab? Some people using omalizumab have had a severe, life-threatening allergic reaction either right after the injection or hours later. An allergic reaction may occur even after using omalizumab regularly for a year or longer. Get emergency medical help if you have signs of an allergic reaction: hives, itching; anxiety or fear; flushing (warmth, redness, or tingly feeling); feeling like you might pass out; chest tightness, wheezing, cough, feeling short of breath, difficult breathing; fast or weak heartbeats; swelling of your face, lips, tongue, or throat. What is omalizumab? Omalizumab is used to treat moderate to severe asthma that is caused by allergies in adults and children who are at least 10years old. Omalizumab is used when asthma symptoms are not controlled by asthma inhaled steroid medicine. Omalizumab is not a rescue medicine for treating an asthma attack. Omalizumab is also used to treat chronic hives (idiopathic urticaria) in adults and children who are at least 15years old, after antihistamines have been tried without success. Omalizumab is not for use in treating other allergies, rashes, or attacks of bronchospasm. Omalizumab may also be used for purposes not listed in this medication guide. What should I discuss with my healthcare provider before using omalizumab? You should not use omalizumab if you are allergic to it.   Tell your doctor if you have any signs of infection (fever, swollen glands, general ill feeling), or if you have ever had:  · any other allergies (foods, pollens, etc);  · allergy shots;  · a severe allergic reaction (anaphylaxis);  · an infection caused by parasites (such as giardia, malaria, leishmaniasis, hookworm, pinworm, toxoplasmosis, and many others);  · a heart attack or stroke;  · cancer; or  · a latex allergy. Using this medicine may increase your risk of certain types of cancers of the breast, skin, prostate, or salivary gland. Talk to your doctor about your individual risk. While you are using omalizumab, you may also have an increased risk of becoming infected with parasites (worms) if you live in or travel to areas where such infections are common. Talk with your doctor about what to look for and how to treat this condition. Some babies born to mothers using omalizumab during pregnancy had low birth weight. However, it is not known whether this was due to omalizumab use or to severe asthma in the mothers. The benefit of treating asthma may outweigh any risks to the baby. Tell your doctor if you are pregnant. If you are pregnant, your name may be listed on a pregnancy registry to track the effects of omalizumab on the baby. It may not be safe to breastfeed while using this medicine. Ask your doctor about any risk. How is omalizumab given? Your doctor may perform an allergy skin test or blood test to make sure this medicine is right for you. Omalizumab is injected under the skin. A healthcare provider will give you this injection every 2 or 4 weeks. Your condition may not improve right away. For best results, keep receiving omalizumab as directed. Talk with your doctor if your symptoms do not improve after a few weeks of treatment. Omalizumab doses are based on weight. Your dose needs may change if you gain or lose weight. If you also use a steroid medication, you should not stop using it suddenly. Follow your doctor's instructions about tapering your dose. Seek medical attention if your breathing problems get worse quickly, or if you think your asthma medications are not working as well. You may need frequent medical tests, such as allergy tests and lung function tests. Your stools may also need to be checked for parasites, especially if you travel. What happens if I miss a dose?   Call your report side effects to FDA at 5-557-FDA-7103. What other drugs will affect omalizumab? Other drugs may affect omalizumab, including prescription and over-the-counter medicines, vitamins, and herbal products. Tell your doctor about all your current medicines and any medicine you start or stop using. Where can I get more information? Your doctor or pharmacist can provide more information about omalizumab. Remember, keep this and all other medicines out of the reach of children, never share your medicines with others, and use this medication only for the indication prescribed. Every effort has been made to ensure that the information provided by [facility]  is accurate, up-to-date, and complete, but no guarantee is made to that effect. Drug information contained herein may be time sensitive. [employer] information has been compiled for use by healthcare practitioners and consumers in the [place] and therefore [employer] does not warrant that uses outside of the [place] are appropriate, unless specifically indicated otherwise. [facility] drug information does not endorse drugs, diagnose patients or recommend therapy. [employer] drug information is an informational resource designed to assist licensed healthcare practitioners in caring for their patients and/or to serve consumers viewing this service as a supplement to, and not a substitute for, the expertise, skill, knowledge and judgment of healthcare practitioners. The absence of a warning for a given drug or drug combination in no way should be construed to indicate that the drug or drug combination is safe, effective or appropriate for any given patient. [employer] does not assume any responsibility for any aspect of healthcare administered with the aid of information [employer] provides.  The information contained herein is not intended to cover all possible uses, directions, precautions, warnings, drug interactions, allergic reactions, or adverse effects. If you have questions about the drugs you are taking, check with your doctor, nurse or pharmacist.  Copyright 3715-7017 [address]. Version: 5.01. Revision date: 5/29/2019. Care instructions adapted under license by [facility] ([facility]). If you have questions about a medical condition or this instruction, always ask your healthcare professional. [place] [address] any warranty or liability for your use of this information.

## 2021-03-22 NOTE — PROGRESS NOTES
Patient here today to receive Xolair injection. Patient does not report any previous reaction from Xolair injections. Denies any nausea vomiting fever urticaria or angioedema. Denies any recent exacerbation of asthma or asthma-like symptoms. Patient was explained benefits and potential risks including anaphylaxis to patient. Following obtaining verbal and written consent (on file)  Xolair injection was prepared 30 minutes prior to injection according to manufacture guidelines. LYOPHILIZED POWDER (VIAL); reconstituted with 1.4 ml injectable sterile water per Vial.      Patient has been explained the risk and benefits including delayed anaphylaxis. Patient has EpiPen and understands how to appropriately use. Xolair 150 mg given subcutaneously in LT&RT lower abdomen  for a total combined dose of 300mg using a 25-gauge needle and 3 ML syringe. Site injections may include the following sites:  RIGHT  / LEFT UPPER ARM,  RIGHT  / LEFT FRONT OR MIDDLE OF THIGH, OR STOMACH          SAMPLE:  Xolair  NDC 79926-692-48(2)   Lot [id number](8)   Expires 05/2021(2)    Prior to patient receiving Xolair area was cleansed with alcohol swabs. Following the administration no evidence of bleeding or bruising. Patient  observed for 2 hours following first three injections and then 30 minutes following each subsequent administration. No adverse reactions reported. Patient tolerated well without adverse reactions or side effects. Patient to continue to receive Xolair injections monthly. Will report any problems to this office.     Patient to return to clinic monthly for Xolair injections and will follow-up with provider a minimum of every 6 months for evaluation and labs including CMP, CBC with diff, and IGE    SHOT REACTION TREATMENT INSTRUCTIONS    During the 30 minute wait after an allergy injection the following symptoms should be reported:    Itching other than at the injection site  Hives or swelling other than at the injection site  Redness other than at the injection site  Difficulty breathing  Chest tightness  Difficulty swallowing  Throat tightness    If these symptoms occur, NOTIFY PROVIDER and the following treatment should be administered:    1. Epinephrine 1:1000 IM - 0.3 ml if > 66 lbs or more, 0.15 ml if 33 - 63 lbs, or 0.1 ml if <33 lbs   2. Diphenhydramine - give all intramuscular:     2 to <6 years (off-label use): 6.25 mg,    6 to <12 years: 12.5 to 25 mg;    ?12 years: 25-50 mg.  3.  Famotidine:  Adults 40 mg oral    Adolescents age 12 years and >88 lbs: 40 mg    Children and Adolescents ? 12years of age: Initial: 0.25 mg/kg/dose   every 12 hours (maximum daily dose: 40 mg/day)    Epipen dose may be repeated in 5-15 minutes if adequate resolution of symptoms does not occur    Patient should be observed for at least one hour after final epi dose and must be seen by provider. Patients cannot drive themselves if they have received diphenhydramine.

## 2021-03-24 DIAGNOSIS — J30.9 ALLERGIC SINUSITIS: Primary | ICD-10-CM

## 2021-03-24 DIAGNOSIS — J30.81 CAT ALLERGIES: ICD-10-CM

## 2021-03-24 DIAGNOSIS — J30.1 ALLERGY TO TREES: ICD-10-CM

## 2021-03-24 DIAGNOSIS — Z91.038 ALLERGY TO COCKROACHES: ICD-10-CM

## 2021-03-24 DIAGNOSIS — Z91.048 ALLERGY TO MOLD: ICD-10-CM

## 2021-03-24 PROCEDURE — 95165 ANTIGEN THERAPY SERVICES: CPT | Performed by: NURSE PRACTITIONER

## 2021-03-24 NOTE — PROGRESS NOTES
ALLERGY EXTRACT - Silver,Green Vials: BUILDING PHASE    TOTAL VIALS PREPARED . .. 6 (3 SETS)  TOTAL DOSES 60 INJECTIONS ( 6 IN EACH SET OR 10 SHOTS PER VIAL)    An allergy extract was prepared according to written prescription by provider. Provider onsite during allergy extract preparation. Patient vial(s) include the following:     RED VIAL - 1:1 CONCENTRATION - EXPIRES 1 YEAR  YELLOW VIAL-1:10 CONCENTRATION - EXPIRES 6 MONTHS  BLUE VIAL-1:100 CONCENTRATION - EXPIRES 6 MONTHS  GREEN VIAL-1:1,000 CONCENTRATION - EXPIRES 3 MONTHS  SILVER VIAL-1:10,000 CONCENTRATION - EXPIRES 3 MONTHS    Allergy extract was prepared by the following method: Once the  one-to-one concentration was prepared in the red vial, 0.5 ML's was then extracted in place into the yellow vial to achieve a 1:10 concentration. Then 0.5 ML's was extracted from the yellow vial and placed into the blue file with dilutant to achieve a 1:100 concentration. Then 0.5 ML's was extracted from the blue vial and placed into Green vial with dilute to achieve a 1:1,000 concentration. Finally 0.5 ML's was taken from the green vial and placed in the Silver vial with dilutant to achieve a 1:10,000 concentration. TOTAL DOSES 50     Patient vials were labeled with name, date-of-birth, vial number, concentration, and expiration. When injecting from a new  vial the first injections is 0.05 ml and are increased by 0.05 increments until 0.5ml from the vial is achieved or the patient reaches maximum tolerated dose. Injections are given once ot three times weekly and at least 24 hours apart, according to provider orders. If patient has complications or \"knots\" or maximum tolerance the dose building is reduced, stopped, or maintained according to patient reaction and provider orders. This is documented on the injection log. Patients on build-up plan should be seen every 3 months or sooner if necessary.   The injection record and serum is reviewed and documented at every injection appointment. When down to the last 1/3 of the bottle of the red 1:1 concentration the patient should be scheduled an appointment for new orders for allergy maintenance concentrations. If it is the patients preference to receive and injection at an outside medical office, is is allowed only after the patient receives the first dose from each vial at this practice. Patients requesting refills that receive injections at outside medical facilities must include the patient's demographic sheet, current insurance information and the injection records. Allow 2-3 weeks for delivery after the refill has been requested. PROTOCOL FOR LATE INJECTIONS  Days late determined from the due date of the injection    Shot Frequency 5-10 Days Late 11-16 Days Late 17-30 Days Late 31-60 Days Late 61+ Days Late   Twice Weekly Repeat last dose Reduce last dose . 2 Reduce last dose . 4 Dilute back 1 vial, start at .05 Patient must start over     Weekly Repeat last dose Reduce last dose . 2 Reduce last dose . 4 Dilute back 1 vial, start at .05 Patient must start over   Every 2 Weeks Repeat last dose Reduce last dose . 2. Reduce last dose . 4 Ask provider for instruction Ask provider for instruction   Every 3 Weeks Repeat last dose Reduce last dose . 2 Reduce last dose . 4 Ask provider for instruction Ask provider for instruction   Every 4 Weeks Repeat last dose Reduce last dose . 2 Reduce last dose . 4 Ask provider for instruction Ask provider for instruction     Patient/gaurdian made aware of potential anaphylaxis risks associated with receiving allergy injections and wishes to proceed.   SHOT REACTION TREATMENT INSTRUCTIONS    During the 30 minute wait after an allergy injection the following symptoms should be reported:    Itching other than at the injection site  Hives or swelling other than at the injection site  Redness other than at the injection site  Difficulty breathing  Chest tightness  Difficulty

## 2021-04-08 ENCOUNTER — NURSE ONLY (OUTPATIENT)
Dept: ALLERGY | Age: 31
End: 2021-04-08
Payer: COMMERCIAL

## 2021-04-08 VITALS
HEART RATE: 70 BPM | TEMPERATURE: 97.2 F | SYSTOLIC BLOOD PRESSURE: 108 MMHG | RESPIRATION RATE: 14 BRPM | DIASTOLIC BLOOD PRESSURE: 68 MMHG

## 2021-04-08 DIAGNOSIS — J30.1 ALLERGY TO TREES: ICD-10-CM

## 2021-04-08 DIAGNOSIS — Z91.038 ALLERGY TO COCKROACHES: ICD-10-CM

## 2021-04-08 DIAGNOSIS — Z51.6 ENCOUNTER FOR DESENSITIZATION TO ALLERGENS: Primary | ICD-10-CM

## 2021-04-08 DIAGNOSIS — Z91.048 ALLERGY TO MOLD: ICD-10-CM

## 2021-04-08 PROCEDURE — 95117 IMMUNOTHERAPY INJECTIONS: CPT | Performed by: NURSE PRACTITIONER

## 2021-04-08 NOTE — PROGRESS NOTES
After consent obtained/verified, allergy injection given in back of R/L arm(s). VIAL COLOR OF ALL VIALS TODAY IS SILVER VIALS    ALLERGY INJECTION FROM VIAL A GIVEN RT  UPPER ARM IN THE AMOUNT OF 0.25ML    ALLERGY INJECTION FROM VIAL B GIVEN LT UPPER ARM IN THE AMOUNT OF 0.25 ML    ALLERGY INJECTION FROM VIAL C GIVEN LT LOWER  ARM IN THE AMOUNT OF 0.25 ML      Documentation of vial injection specific to arm(s) noted on Allergy Immunotherapy Administration Form. Patient waited 30 minutes for observation. Patient tolerated well without adverse reaction. SHOT REACTION TREATMENT INSTRUCTIONS    During the 30 minute wait after an allergy injection the following symptoms should be reported:    Itching other than at the injection site  Hives or swelling other than at the injection site  Redness other than at the injection site  Difficulty breathing  Chest tightness  Difficulty swallowing  Throat tightness    If these symptoms occur, NOTIFY PROVIDER and the following treatment should be administered:    1. Epinephrine/Auvi Q 1:1000 IM - 0.3 ml if > 66 lbs or more, 0.15 ml if 33 - 63 lbs, or 0.1 ml if <33 lbs     2. Diphenhydramine - give all intramuscular:     2 to <6 years (off-label use): 6.25 mg,    6 to <12 years: 12.5 to 25 mg;    ?12 years: 25-50 mg.    3.  Famotidine:  Adults 40 mg oral    Adolescents age 12 years and >88 lbs: 40 mg    Children and Adolescents ? 12years of age: Initial: 0.25 mg/kg/dose  every 12 hours (maximum daily dose: 40 mg/day)    Epi/Auvi Q dose may me repeated in 5-15 minutes if adequate resolution of symptoms does not occur    Patient should be observed for at least one hour after final Epi/Auvi Q dose and must be seen by provider. Patients cannot drive themselves if they have received diphenhydramine.

## 2021-04-14 ENCOUNTER — HOSPITAL ENCOUNTER (OUTPATIENT)
Age: 31
Discharge: HOME | End: 2021-04-14
Payer: COMMERCIAL

## 2021-04-14 LAB
BASOPHILS # BLD: 0.7 %
BASOPHILS ABSOLUTE: 0 THOU/MM3 (ref 0–0.1)
EOSINOPHIL # BLD: 1.9 %
EOSINOPHILS ABSOLUTE: 0.1 THOU/MM3 (ref 0–0.4)
ERYTHROCYTE [DISTWIDTH] IN BLOOD BY AUTOMATED COUNT: 14.9 % (ref 11.5–14.5)
ERYTHROCYTE [DISTWIDTH] IN BLOOD BY AUTOMATED COUNT: 46.3 FL (ref 35–45)
HCT VFR BLD CALC: 40.7 % (ref 37–47)
HEMOGLOBIN: 12 GM/DL (ref 12–16)
IMMATURE GRANS (ABS): 0.01 THOU/MM3 (ref 0–0.07)
IMMATURE GRANULOCYTES: 0.2 %
INFLUENZA A: NOT DETECTED
INFLUENZA B: NOT DETECTED
LYMPHOCYTES # BLD: 62.1 %
LYMPHOCYTES ABSOLUTE: 3.6 THOU/MM3 (ref 1–4.8)
MCH RBC QN AUTO: 25.2 PG (ref 26–33)
MCHC RBC AUTO-ENTMCNC: 29.5 GM/DL (ref 32.2–35.5)
MCV RBC AUTO: 85.3 FL (ref 81–99)
MONOCYTES # BLD: 7.5 %
MONOCYTES ABSOLUTE: 0.4 THOU/MM3 (ref 0.4–1.3)
NUCLEATED RED BLOOD CELLS: 0 /100 WBC
PLATELET # BLD: 290 THOU/MM3 (ref 130–400)
PMV BLD AUTO: 11.6 FL (ref 9.4–12.4)
RBC # BLD: 4.77 MILL/MM3 (ref 4.2–5.4)
SARS-COV-2 RNA, RT PCR: NOT DETECTED
SEG NEUTROPHILS: 27.6 %
SEGMENTED NEUTROPHILS ABSOLUTE COUNT: 1.6 THOU/MM3 (ref 1.8–7.7)
WBC # BLD: 5.8 THOU/MM3 (ref 4.8–10.8)

## 2021-04-14 PROCEDURE — 85025 COMPLETE CBC W/AUTO DIFF WBC: CPT

## 2021-04-14 PROCEDURE — 36415 COLL VENOUS BLD VENIPUNCTURE: CPT

## 2021-04-14 PROCEDURE — 87636 SARSCOV2 & INF A&B AMP PRB: CPT

## 2021-04-20 ENCOUNTER — HOSPITAL ENCOUNTER (OUTPATIENT)
Dept: PULMONOLOGY | Age: 31
Discharge: HOME | End: 2021-04-20
Payer: COMMERCIAL

## 2021-04-20 DIAGNOSIS — J45.40 MODERATE PERSISTENT ASTHMA, UNSPECIFIED WHETHER COMPLICATED: ICD-10-CM

## 2021-04-20 PROCEDURE — 94726 PLETHYSMOGRAPHY LUNG VOLUMES: CPT

## 2021-04-20 PROCEDURE — 94060 EVALUATION OF WHEEZING: CPT

## 2021-04-20 PROCEDURE — 94729 DIFFUSING CAPACITY: CPT

## 2021-04-21 ENCOUNTER — NURSE ONLY (OUTPATIENT)
Dept: ALLERGY | Age: 31
End: 2021-04-21
Payer: COMMERCIAL

## 2021-04-21 VITALS
TEMPERATURE: 97.1 F | SYSTOLIC BLOOD PRESSURE: 108 MMHG | RESPIRATION RATE: 14 BRPM | DIASTOLIC BLOOD PRESSURE: 62 MMHG | HEART RATE: 66 BPM

## 2021-04-21 DIAGNOSIS — L50.1 CHRONIC IDIOPATHIC URTICARIA: Primary | ICD-10-CM

## 2021-04-21 DIAGNOSIS — Z91.038 ALLERGY TO COCKROACHES: ICD-10-CM

## 2021-04-21 DIAGNOSIS — Z91.048 ALLERGY TO MOLD: ICD-10-CM

## 2021-04-21 DIAGNOSIS — J30.1 ALLERGY TO TREES: ICD-10-CM

## 2021-04-21 DIAGNOSIS — J30.81 CAT ALLERGIES: ICD-10-CM

## 2021-04-21 DIAGNOSIS — Z51.6 ENCOUNTER FOR DESENSITIZATION TO ALLERGENS: ICD-10-CM

## 2021-04-21 DIAGNOSIS — J45.40 MODERATE PERSISTENT ASTHMA WITHOUT COMPLICATION: ICD-10-CM

## 2021-04-21 PROCEDURE — 95117 IMMUNOTHERAPY INJECTIONS: CPT | Performed by: NURSE PRACTITIONER

## 2021-04-21 PROCEDURE — 96401 CHEMO ANTI-NEOPL SQ/IM: CPT | Performed by: NURSE PRACTITIONER

## 2021-04-21 RX ORDER — HYDROCODONE BITARTRATE AND ACETAMINOPHEN 5; 325 MG/1; MG/1
TABLET ORAL
COMMUNITY
Start: 2021-02-10 | End: 2021-04-23

## 2021-04-21 RX ORDER — FEXOFENADINE HCL 180 MG/1
TABLET ORAL
COMMUNITY
Start: 2021-02-03 | End: 2021-09-22

## 2021-04-21 NOTE — PROGRESS NOTES
After consent obtained/verified, allergy injection given in back of R/L arm(s). VIAL COLOR OF ALL VIALS TODAY IS SILVER VIALS    ALLERGY INJECTION FROM VIAL A GIVEN LT   UPPER ARM IN THE AMOUNT OF 0.25 ML    ALLERGY INJECTION FROM VIAL B GIVEN RT UPPER ARM IN THE AMOUNT OF 0.25 ML    ALLERGY INJECTION FROM VIAL C GIVEN RT LOWER ARM IN THE AMOUNT OF 0.25 ML      Documentation of vial injection specific to arm(s) noted on Allergy Immunotherapy Administration Form. Patient signed waiver and declined to wait 30 minutes for observation. Patient tolerated well without adverse reaction. SHOT REACTION TREATMENT INSTRUCTIONS    During the 30 minute wait after an allergy injection the following symptoms should be reported:    Itching other than at the injection site  Hives or swelling other than at the injection site  Redness other than at the injection site  Difficulty breathing  Chest tightness  Difficulty swallowing  Throat tightness    If these symptoms occur, NOTIFY PROVIDER and the following treatment should be administered:    1. Epinephrine/Auvi Q 1:1000 IM - 0.3 ml if > 66 lbs or more, 0.15 ml if 33 - 63 lbs, or 0.1 ml if <33 lbs     2. Diphenhydramine - give all intramuscular:     2 to <6 years (off-label use): 6.25 mg,    6 to <12 years: 12.5 to 25 mg;    ?12 years: 25-50 mg.    3.  Famotidine:  Adults 40 mg oral    Adolescents age 12 years and >88 lbs: 40 mg    Children and Adolescents ? 12years of age: Initial: 0.25 mg/kg/dose  every 12 hours (maximum daily dose: 40 mg/day)    Epi/Auvi Q dose may me repeated in 5-15 minutes if adequate resolution of symptoms does not occur    Patient should be observed for at least one hour after final Epi/Auvi Q dose and must be seen by provider. Patients cannot drive themselves if they have received diphenhydramine.

## 2021-04-21 NOTE — PROGRESS NOTES
Patient here today to receive Xolair injection. Patient does not report any previous reaction from Xolair injections. Denies any nausea vomiting fever urticaria or angioedema. Denies any recent exacerbation of asthma or asthma-like symptoms. Patient was explained benefits and potential risks including anaphylaxis to patient. Following obtaining verbal and written consent (on file)  Xolair injection was prepared 30 minutes prior to injection according to manufacture guidelines. LYOPHILIZED POWDER (VIAL); reconstituted with 1.4 ml injectable sterile water per Vial.      Patient has been explained the risk and benefits including delayed anaphylaxis. Patient has EpiPen and understands how to appropriately use. Xolair 150 mg given subcutaneously in LT&RT LOWER ABDOMEN  for a total combined dose of  300mg using a 25-gauge needle and 3 ML syringe. Site injections may include the following sites:  RIGHT  / LEFT UPPER ARM,  RIGHT  / LEFT FRONT OR MIDDLE OF THIGH, OR STOMACH        Xolair        [address] 72727-209-50(5)   Lot [id number])  Expires 11/2021(2)    Prior to patient receiving Xolair area was cleansed with alcohol swabs. Following the administration no evidence of bleeding or bruising. Patient  observed for 2 hours following first three injections and then 30 minutes following each subsequent administration. No adverse reactions reported. Patient tolerated well without adverse reactions or side effects. Patient to continue to receive Xolair injections monthly. Will report any problems to this office.     Patient to return to clinic monthly for Xolair injections and will follow-up with provider a minimum of every 6 months for evaluation and labs including CMP, CBC with diff, and IGE    SHOT REACTION TREATMENT INSTRUCTIONS    During the 30 minute wait after an allergy injection the following symptoms should be reported:    Itching other than at the injection site  Hives or swelling other than at the injection site  Redness other than at the injection site  Difficulty breathing  Chest tightness  Difficulty swallowing  Throat tightness    If these symptoms occur, NOTIFY PROVIDER and the following treatment should be administered:    1. Epinephrine 1:1000 IM - 0.3 ml if > 66 lbs or more, 0.15 ml if 33 - 63 lbs, or 0.1 ml if <33 lbs   2. Diphenhydramine - give all intramuscular:     2 to <6 years (off-label use): 6.25 mg,    6 to <12 years: 12.5 to 25 mg;    ?12 years: 25-50 mg.  3.  Famotidine:  Adults 40 mg oral    Adolescents age 12 years and >88 lbs: 40 mg    Children and Adolescents ? 12years of age: Initial: 0.25 mg/kg/dose   every 12 hours (maximum daily dose: 40 mg/day)    Epipen dose may be repeated in 5-15 minutes if adequate resolution of symptoms does not occur    Patient should be observed for at least one hour after final epi dose and must be seen by provider. Patients cannot drive themselves if they have received diphenhydramine.

## 2021-04-23 ENCOUNTER — HOSPITAL ENCOUNTER (EMERGENCY)
Age: 31
Discharge: HOME | End: 2021-04-23
Payer: COMMERCIAL

## 2021-04-23 VITALS
RESPIRATION RATE: 18 BRPM | SYSTOLIC BLOOD PRESSURE: 137 MMHG | OXYGEN SATURATION: 99 % | HEART RATE: 99 BPM | DIASTOLIC BLOOD PRESSURE: 88 MMHG | TEMPERATURE: 97.2 F

## 2021-04-23 DIAGNOSIS — J30.1 ALLERGIC RHINITIS DUE TO POLLEN, UNSPECIFIED SEASONALITY: ICD-10-CM

## 2021-04-23 DIAGNOSIS — J06.9 VIRAL URI WITH COUGH: ICD-10-CM

## 2021-04-23 DIAGNOSIS — Z20.822 ENCOUNTER FOR LABORATORY TESTING FOR COVID-19 VIRUS: Primary | ICD-10-CM

## 2021-04-23 PROCEDURE — U0003 INFECTIOUS AGENT DETECTION BY NUCLEIC ACID (DNA OR RNA); SEVERE ACUTE RESPIRATORY SYNDROME CORONAVIRUS 2 (SARS-COV-2) (CORONAVIRUS DISEASE [COVID-19]), AMPLIFIED PROBE TECHNIQUE, MAKING USE OF HIGH THROUGHPUT TECHNOLOGIES AS DESCRIBED BY CMS-2020-01-R: HCPCS

## 2021-04-23 PROCEDURE — 99213 OFFICE O/P EST LOW 20 MIN: CPT

## 2021-04-23 PROCEDURE — U0005 INFEC AGEN DETEC AMPLI PROBE: HCPCS

## 2021-04-23 PROCEDURE — 99213 OFFICE O/P EST LOW 20 MIN: CPT | Performed by: NURSE PRACTITIONER

## 2021-04-23 RX ORDER — AZITHROMYCIN 250 MG/1
TABLET, FILM COATED ORAL
Qty: 6 TABLET | Refills: 0 | Status: SHIPPED | OUTPATIENT
Start: 2021-04-23 | End: 2021-09-22

## 2021-04-23 ASSESSMENT — ENCOUNTER SYMPTOMS
VOMITING: 0
SINUS PAIN: 0
SINUS PRESSURE: 0
NAUSEA: 0
BACK PAIN: 0
WHEEZING: 0
TROUBLE SWALLOWING: 0
SHORTNESS OF BREATH: 0
RHINORRHEA: 1
SORE THROAT: 0
SINUS CONGESTION: 1
COUGH: 0
DIARRHEA: 0

## 2021-04-23 ASSESSMENT — PAIN DESCRIPTION - PAIN TYPE: TYPE: ACUTE PAIN

## 2021-04-23 NOTE — LETTER
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 I recommend:return without restrictions evaluated negative test results. If the patient would test positive they should isolator quarantine per [address] guidelines. If there are questions or concerns, please have the patient contact our office.         Sincerely,    Electronically signed by ANDERSON Eric CNP on 4/23/2021 at [facility], ANDERSON Perdomo CNP

## 2021-04-23 NOTE — ED NOTES
Pt discharged. Pt verbalized understanding of discharge instructions and script. Pt walked out per self. Pt in stable condition.      Mio , LPN  26/59/80 [id number]

## 2021-04-23 NOTE — ED PROVIDER NOTES
Positive for fatigue. Negative for activity change, appetite change, chills and fever. HENT: Positive for congestion, postnasal drip and rhinorrhea. Negative for ear pain, sinus pressure, sinus pain, sore throat and trouble swallowing. Respiratory: Negative for cough, shortness of breath and wheezing. Cardiovascular: Negative for chest pain. Gastrointestinal: Negative for diarrhea, nausea and vomiting. Musculoskeletal: Positive for myalgias. Negative for back pain and neck stiffness. Skin: Negative for rash. Allergic/Immunologic: Positive for environmental allergies. Neurological: Negative for dizziness, light-headedness and headaches. Hematological: Negative for adenopathy. PAST MEDICAL HISTORY         Diagnosis Date    Anemia     on iron supplements    Asthma     Albuterol and Advair    Bladder prolapse, female, acquired     Constipation     Headache(784.0)     Interstitial cystitis     Mental disorder     bipolar    Sickle cell anemia (HCC)     Has trait       SURGICALHISTORY     Patient  has a past surgical history that includes Ankle surgery; Colonoscopy (2013); laparoscopy (7/1/13); [place] tooth extraction (2014); Cystocopy (9/28/15); Foot surgery (Bilateral, 06/22/2016); Dilation and curettage of uterus; Foot surgery (Left, 09/27/2017); pr gastrocnemius recession (Left, 9/27/2017); other surgical history (Left, 02/14/2018); pr removal deep implant (Left, 2/14/2018); Foot surgery (Right, 03/28/2018); and pr full excis 5th metatarsal head (Right, 3/28/2018).     CURRENT MEDICATIONS       Discharge Medication List as of 4/23/2021  4:43 PM      CONTINUE these medications which have NOT CHANGED    Details   budesonide-formoterol (SYMBICORT) 160-4.5 MCG/ACT AERO Inhale 2 puffs into the lungs 2 times daily, Disp-1 Inhaler, R-5Normal      albuterol sulfate HFA (PROVENTIL HFA) 108 (90 Base) MCG/ACT inhaler Inhale 2 puffs into the lungs every 6 hours as needed for Wheezing, Disp-1 Exam  Vitals signs and nursing note reviewed. Constitutional:       General: She is not in acute distress. Appearance: Normal appearance. She is well-developed and well-groomed. She is not ill-appearing, toxic-appearing or diaphoretic. HENT:      Head: Normocephalic. Right Ear: Hearing, tympanic membrane, ear canal and external ear normal. No drainage, swelling or tenderness. No mastoid tenderness. Left Ear: Hearing, tympanic membrane, ear canal and external ear normal. No drainage, swelling or tenderness. No mastoid tenderness. Nose: Congestion and rhinorrhea present. Right Sinus: No maxillary sinus tenderness or frontal sinus tenderness. Left Sinus: No maxillary sinus tenderness or frontal sinus tenderness. Mouth/Throat:      Lips: Pink. Mouth: Mucous membranes are moist.      Pharynx: Uvula midline. No posterior oropharyngeal erythema or uvula swelling. Tonsils: No tonsillar exudate or tonsillar abscesses. Eyes:      Conjunctiva/sclera: Conjunctivae normal.      Pupils: Pupils are equal, round, and reactive to light. Neck:      Musculoskeletal: Full passive range of motion without pain and normal range of motion. No neck rigidity. Cardiovascular:      Rate and Rhythm: Normal rate and regular rhythm. Heart sounds: Normal heart sounds. Pulmonary:      Effort: Pulmonary effort is normal. No accessory muscle usage. Breath sounds: Normal breath sounds. No decreased breath sounds, wheezing, rhonchi or rales. Abdominal:      General: Bowel sounds are normal.      Palpations: Abdomen is soft. Tenderness: There is no abdominal tenderness. There is no right CVA tenderness, left CVA tenderness or guarding. Negative signs include Dyson's sign. Lymphadenopathy:      Head:      Right side of head: No submental, submandibular, tonsillar, preauricular, posterior auricular or occipital adenopathy.       Left side of head: No submental, submandibular, tonsillar, preauricular, posterior auricular or occipital adenopathy. Cervical: No cervical adenopathy. Right cervical: No superficial, deep or posterior cervical adenopathy. Left cervical: No superficial, deep or posterior cervical adenopathy. Upper Body:      Right upper body: No supraclavicular adenopathy. Left upper body: No supraclavicular adenopathy. Skin:     General: Skin is warm and dry. Capillary Refill: Capillary refill takes less than 2 seconds. Findings: No rash. Neurological:      Mental Status: She is alert and oriented to person, place, and time. Psychiatric:         Mood and Affect: Mood normal.         Behavior: Behavior normal. Behavior is cooperative. DIAGNOSTIC RESULTS     Labs:No results found for this visit on 04/23/21. IMAGING:    No orders to display         EKG:      URGENT CARE COURSE:     Vitals:    04/23/21 1626 04/23/21 1630   BP:  137/88   Pulse: 101 99   Resp:  18   Temp: 98 °F (36.7 °C) 97.2 °F (36.2 °C)   TempSrc: Tympanic Tympanic   SpO2:  99%       Medications - No data to display         PROCEDURES:  None    FINAL IMPRESSION      1. Encounter for laboratory testing for COVID-19 virus    2. Allergic rhinitis due to pollen, unspecified seasonality    3. Viral URI with cough          DISPOSITION/ PLAN        I did discuss with Patient/patient's representative physical findings, vital signs, clinical data obtained and feel at this time the patient can be discharged to outpatient status with conservative management. I did discuss with patient at this time he did not meet or have risk factors for testing such as traveling out of the country over the past 14 days, known contacts with anybody with COVID-19 and due to the selective nature of testing at this time. The patient was told although the risk is low he needs to continue to monitor the patient was advised to drink plenty of fluids.   They were also advised to isolate at home for up to 2 weeks and promote social distancing. They may take Tylenol for fever or body aches. Take prescribed medication as directed if prescribed. The patient may also take OTC cough and cold medication as needed. Pt is advised to go to ER if symptoms worsen, new symptoms develop, high fever >100, chest pain or heaviness, breathing difficulty, lethargy to Dial 911 or call [facility] COVID-[place] hotline number [phone] or your local [address] [place] [address]. The patient or patient's representative is agreeable to the treatment plan they're advised to follow-up with her primary care provider in one week for reevaluation.       PATIENT REFERRED TO:  Luma Barba MD  [address] [facility] [address] [address]      DISCHARGE MEDICATIONS:  Discharge Medication List as of 4/23/2021  4:43 PM      START taking these medications    Details   azithromycin (ZITHROMAX Z-NATHAN) 250 MG tablet 2 tablets day 1 then1 tablet days 2 - 5., Disp-6 tablet, R-0Print             Discharge Medication List as of 4/23/2021  4:43 PM      STOP taking these medications       HYDROcodone-acetaminophen ([address]) 5-325 MG per tablet Comments:   Reason for Stopping:               Discharge Medication List as of 4/23/2021  4:43 PM          ANDERSON Culver CNP    (Please note that portions of this note were completed with a voice recognition program. Efforts were made to edit the dictations but occasionally words are mis-transcribed.)           ANDERSON Culver CNP  04/23/21 6807

## 2021-04-25 LAB
SARS-COV-2: NOT DETECTED
SOURCE: NORMAL

## 2021-08-26 DIAGNOSIS — J45.40 MODERATE PERSISTENT ASTHMA, UNSPECIFIED WHETHER COMPLICATED: ICD-10-CM

## 2021-08-26 RX ORDER — ALBUTEROL SULFATE 90 UG/1
2 AEROSOL, METERED RESPIRATORY (INHALATION) EVERY 6 HOURS PRN
Qty: 1 INHALER | Refills: 0 | Status: SHIPPED | OUTPATIENT
Start: 2021-08-26 | End: 2021-09-22 | Stop reason: SDUPTHER

## 2021-08-26 RX ORDER — PSEUDOEPHEDRINE HYDROCHLORIDE 30 MG/1
30 TABLET ORAL EVERY 6 HOURS PRN
Qty: 120 TABLET | Refills: 2 | Status: SHIPPED | OUTPATIENT
Start: 2021-08-26 | End: 2022-08-26

## 2021-08-26 NOTE — TELEPHONE ENCOUNTER
Rx sent to requested pharmacy. She needs to limit the use of these medications.  If she is continuing to use it several times a day every day, we should have her follow up

## 2021-08-26 NOTE — TELEPHONE ENCOUNTER
Patient would like a refill on albuterol inhale sent to [facility] pharmacy. pharmacy change in the system.

## 2021-09-22 ENCOUNTER — OFFICE VISIT (OUTPATIENT)
Dept: ALLERGY | Age: 31
End: 2021-09-22
Payer: COMMERCIAL

## 2021-09-22 VITALS
HEIGHT: 66 IN | SYSTOLIC BLOOD PRESSURE: 98 MMHG | OXYGEN SATURATION: 100 % | WEIGHT: 143.9 LBS | DIASTOLIC BLOOD PRESSURE: 70 MMHG | RESPIRATION RATE: 16 BRPM | TEMPERATURE: 97.3 F | HEART RATE: 76 BPM | BODY MASS INDEX: 23.13 KG/M2

## 2021-09-22 DIAGNOSIS — J30.1 NON-SEASONAL ALLERGIC RHINITIS DUE TO POLLEN: ICD-10-CM

## 2021-09-22 DIAGNOSIS — L50.1 CHRONIC IDIOPATHIC URTICARIA: ICD-10-CM

## 2021-09-22 DIAGNOSIS — J45.40 MODERATE PERSISTENT ASTHMA, UNSPECIFIED WHETHER COMPLICATED: ICD-10-CM

## 2021-09-22 DIAGNOSIS — Z91.018 FOOD ALLERGY: ICD-10-CM

## 2021-09-22 PROCEDURE — G8427 DOCREV CUR MEDS BY ELIG CLIN: HCPCS | Performed by: NURSE PRACTITIONER

## 2021-09-22 PROCEDURE — G8420 CALC BMI NORM PARAMETERS: HCPCS | Performed by: NURSE PRACTITIONER

## 2021-09-22 PROCEDURE — 1036F TOBACCO NON-USER: CPT | Performed by: NURSE PRACTITIONER

## 2021-09-22 PROCEDURE — 99214 OFFICE O/P EST MOD 30 MIN: CPT | Performed by: NURSE PRACTITIONER

## 2021-09-22 RX ORDER — LEVOCETIRIZINE DIHYDROCHLORIDE 5 MG/1
5 TABLET, FILM COATED ORAL NIGHTLY
Qty: 30 TABLET | Refills: 11 | Status: SHIPPED | OUTPATIENT
Start: 2021-09-22

## 2021-09-22 RX ORDER — EPINEPHRINE 0.3 MG/.3ML
0.3 INJECTION SUBCUTANEOUS ONCE
Qty: 0.3 ML | Refills: 0 | Status: SHIPPED | OUTPATIENT
Start: 2021-09-22 | End: 2021-09-22

## 2021-09-22 RX ORDER — MONTELUKAST SODIUM 10 MG/1
10 TABLET ORAL NIGHTLY
Qty: 30 TABLET | Refills: 11 | Status: SHIPPED | OUTPATIENT
Start: 2021-09-22

## 2021-09-22 RX ORDER — BUDESONIDE AND FORMOTEROL FUMARATE DIHYDRATE 160; 4.5 UG/1; UG/1
2 AEROSOL RESPIRATORY (INHALATION) 2 TIMES DAILY
Qty: 1 EACH | Refills: 5 | Status: SHIPPED | OUTPATIENT
Start: 2021-09-22 | End: 2022-08-17

## 2021-09-22 RX ORDER — ALBUTEROL SULFATE 90 UG/1
2 AEROSOL, METERED RESPIRATORY (INHALATION) EVERY 6 HOURS PRN
Qty: 1 EACH | Refills: 3 | Status: SHIPPED | OUTPATIENT
Start: 2021-09-22 | End: 2022-08-18 | Stop reason: SDUPTHER

## 2021-09-22 RX ORDER — M-VIT,TX,IRON,MINS/CALC/FOLIC 27MG-0.4MG
1 TABLET ORAL DAILY
COMMUNITY

## 2021-09-22 ASSESSMENT — ENCOUNTER SYMPTOMS
SINUS PRESSURE: 0
SHORTNESS OF BREATH: 1
ABDOMINAL PAIN: 0
RHINORRHEA: 0
FACIAL SWELLING: 0
COLOR CHANGE: 0
CHEST TIGHTNESS: 0
COUGH: 1
VOMITING: 0
WHEEZING: 0
DIARRHEA: 0
NAUSEA: 0
APNEA: 0
VOICE CHANGE: 0
TROUBLE SWALLOWING: 0
SORE THROAT: 0
STRIDOR: 0
CHOKING: 0

## 2021-09-22 NOTE — PROGRESS NOTES
@[facility]@    Allergy & Asthma   [address] [facility], [address]  Ph:   [phone]  Fax:[phone]    Provider:  Dr. Holli Jean-Baptiste:   Chief Complaint   Patient presents with    Follow-up     Patient here for 6 month follow up for results of PFT and labs. Last Xolair and allergy injections were on 04/21/21. HISTORY OF PRESENT ILLNESS: ESTABLISHED PATIENT HERE FOR EVALUATION   71-year-old female that continues to have exacerbation of asthma. Patient states that she is not taking the Xolair. She states she had difficulty getting to the office. She has received  previous Xolair injections in the past in the office that she did quite well with. She denies any complications including anaphylaxis. Patient would like to resume the Xolair but would like to do home injections. Today she denies any nausea, vomiting, fever. Patient has had asthma for several years and has been uncontrolled despite taking Symbicort and rescue inhaler along with her allergy medications which include Xyzal and Singulair. Patient states that she does her rescue inhalers appropriately holding her breath for 10 seconds after each inhalation. She states that the Symbicort does help but it just does not last long. Severity of asthma is severe. Patient states that when she did take the Xolair injection she feels like that her asthma was better. She also has extensive food allergies. It has been recommended that she gets allergy injections that she is also not keeping up with due to constraints and difficulty of getting here and having life issues including marriage, a day off, work etc.        Review of Systems:  Review of Systems   Constitutional: Negative for activity change, appetite change, chills, diaphoresis, fatigue, fever and unexpected weight change. HENT: Positive for postnasal drip.  Negative for congestion, dental problem, ear discharge, ear pain, facial swelling, hearing loss, mouth sores, nosebleeds, rhinorrhea, sinus pressure, sneezing, sore throat, tinnitus, trouble swallowing and voice change. Eyes: Negative for visual disturbance. Respiratory: Positive for cough and shortness of breath. Negative for apnea, choking, chest tightness, wheezing and stridor. Cardiovascular: Negative for chest pain, palpitations and leg swelling. Gastrointestinal: Negative for abdominal pain, diarrhea, nausea and vomiting. Endocrine: Negative for cold intolerance, heat intolerance, polydipsia and polyuria. Genitourinary: Negative for difficulty urinating, dysuria, enuresis, hematuria and urgency. Musculoskeletal: Negative for arthralgias, gait problem, neck pain and neck stiffness. Skin: Negative for color change and rash. Allergic/Immunologic: Positive for environmental allergies, food allergies and immunocompromised state. Neurological: Negative for dizziness, syncope, facial asymmetry, speech difficulty, light-headedness and headaches. Hematological: Negative for adenopathy. Does not bruise/bleed easily. Psychiatric/Behavioral: Negative for confusion and sleep disturbance. The patient is not nervous/anxious. All other systems reviewed and are negative.         Past MedicalHistory:    Past Medical History:   Diagnosis Date    Anemia     on iron supplements    Asthma     Albuterol and Advair    Bladder prolapse, female, acquired     Constipation     Headache(784.0)     Interstitial cystitis     Mental disorder     bipolar    [facility]     Has trait       Past Surgical History:  Past Surgical History:   Procedure Laterality Date    ANKLE SURGERY      bilateral ankle revision    COLONOSCOPY  2013    CYSTOSCOPY  9/28/15    WITH HYDRODISTENTION    DILATION AND CURETTAGE OF UTERUS      for Missed AB    FOOT SURGERY Bilateral 06/22/2016    FOOT SURGERY Left 09/27/2017    osteotomy , gastrocnemius resectopm    FOOT SURGERY Right nightly, Disp: 30 tablet, Rfl: 11    montelukast (SINGULAIR) 10 MG tablet, Take 1 tablet by mouth nightly, Disp: 30 tablet, Rfl: 11    omalizumab (XOLAIR) 150 MG/ML SOSY injection, Inject 300 mg into the skin every 28 days Patient may have pen. MAIL TO PATIENTS HOME, Disp: 1 each, Rfl: 5    pseudoephedrine (DECONGESTANT) 30 MG tablet, Take 1 tablet by mouth every 6 hours as needed for Congestion, Disp: 120 tablet, Rfl: 2    fluticasone (FLONASE) 50 MCG/ACT nasal spray, 1 spray by Each Nostril route 2 times daily, Disp: 2 Bottle, Rfl: 5    acetaminophen (TYLENOL) 325 MG tablet, Take 500 mg by mouth every 6 hours as needed for Pain , Disp: , Rfl:       Physical Exam:      Vitals:    Vitals:    09/22/21 1103   BP: 98/70   Pulse: 76   Resp: 16   Temp: 97.3 °F (36.3 °C)   SpO2: 100%       143 lb 14.4 oz (65.3 kg)       Temp: 97.3 °F (36.3 °C) I @FLOWSTAT(6)@ IPulse: 76 I @FLOWSTAT(8)@ I BP: 98/70 I @BHGSPD(72)@; @QQZBDV(72)@ I Resp: 16 I @FLOWSTAT(9)@ I SpO2: 100 % I @FLOWSTAT(10)@ I   I Height: 5' 6\" (167.6 cm) I   I Facility age limit for growth percentiles is 20 years. I     Facility age limit for growth percentiles is 20 years. Facility age limit for growth percentiles is 20 years. Facility age limit for growth percentiles is 20 years. Facility age limit for growth percentiles is 20 years. Physical Exam:    Physical Exam  Vitals and nursing note reviewed. Constitutional:       Appearance: Normal appearance. She is well-developed and normal weight. HENT:      Head: Normocephalic and atraumatic. Right Ear: Ear canal and external ear normal.      Left Ear: Ear canal and external ear normal.      Nose: Nose normal.      Mouth/Throat:      Mouth: Mucous membranes are moist.      Pharynx: Oropharynx is clear. No oropharyngeal exudate. Eyes:      General: No scleral icterus. Right eye: No discharge. Left eye: No discharge. Extraocular Movements: Extraocular movements intact. Conjunctiva/sclera: Conjunctivae normal.      Pupils: Pupils are equal, round, and reactive to light. Neck:      Thyroid: No thyromegaly. Cardiovascular:      Rate and Rhythm: Normal rate and regular rhythm. Pulses: Normal pulses. Heart sounds: Normal heart sounds. Pulmonary:      Effort: Pulmonary effort is normal. No respiratory distress. Breath sounds: Normal breath sounds. No wheezing or rales. Abdominal:      Palpations: Abdomen is soft. Tenderness: There is no abdominal tenderness. Musculoskeletal:         General: No tenderness. Normal range of motion. Cervical back: Normal range of motion and neck supple. Skin:     General: Skin is warm and dry. Findings: No rash. Neurological:      General: No focal deficit present. Mental Status: She is alert and oriented to person, place, and time. Mental status is at baseline. Deep Tendon Reflexes: Reflexes are normal and symmetric. Psychiatric:         Behavior: Behavior normal.         Thought Content: Thought content normal.         Judgment: Judgment normal.        ACT score 8      DATA:  Lab Review:    CBC:   Lab Results   Component Value Date    WBC 5.8 04/14/2021    RBC 4.77 04/14/2021    RBC 4.49 05/15/2019    HGB 12.0 04/14/2021    HCT 40.7 04/14/2021    MCV 85.3 04/14/2021    MCH 25.2 04/14/2021    MCHC 29.5 04/14/2021    RDW 13.1 05/15/2019     04/14/2021          IgE   Date/Time Value Ref Range Status   08/27/2020 01:10  (H) <101 IU/mL Final     Comment:     [facility] [id number] [place] [address] [phone]     Immunoglobulin E   Date/Time Value Ref Range Status   08/27/2020 01:10  (H) <=214 kU/L Final     Comment:     REFERENCE INTERVAL: Immunoglobulin E, Serum  Access complete set of age- and/or gender-specific reference  intervals for this test in the [facility] Test Directory  ([facility]pl[facility].com).         IgG   Date/Time Value Ref Range Status   08/27/2020 01:10 PM 1335 700 - 1600 mg/dL Final     Comment:     [facility] [address], [place] [facility] [phone]     IgA   Date/Time Value Ref Range Status   2020 01:10  70 - 400 mg/dL Final     Comment:     [facility] [address], [address] [facility] [phone]      IgM   Date/Time Value Ref Range Status   2020 01:10  40 - 230 mg/dL Final     Comment:     [facility] [address], [place] [facility] [phone]       No results found for: VAL   No results found for: RF       Results for orders placed during the hospital encounter of 20    XR SINUSES (MIN 3 VIEWS )    Narrative  PROCEDURE: XR SINUSES (MIN 3 VIEWS )    CLINICAL INFORMATION: Chronic pansinusitis. Headaches. Pressure above the eyes. COMPARISON: No prior study. TECHNIQUE: Orquidea Jessica, lateral, and submental vertex views of the paranasal sinuses were obtained. FINDINGS: The frontal sinuses are hypoplastic. All of the other paranasal sinuses are well-developed and well aerated. No mucosal thickening or air-fluid levels are seen. There appears be moderate hypertrophy of the inferior nasal turbinate right side. Cannot exclude rhinitis. Sella turcica is unremarkable. Impression  1. Unremarkable paranasal sinuses. 2. Questionable rhinitis. **This report has been created using voice recognition software. It may contain minor errors which are inherent in voice recognition technology. **    Final report electronically signed by Dr. Fartun Osorio on 2020 1:09 PM     No results found for this or any previous visit. All current and previous medial labs have been reviewed and discussed with patient         Procedures: Allergy Testin2020    Assessment/Orders:    Diagnosis Orders   1. Chronic idiopathic urticaria     2.  Moderate persistent asthma, unspecified whether complicated  albuterol sulfate HFA (PROVENTIL HFA) 108 (90 Base) MCG/ACT inhaler    budesonide-formoterol (SYMBICORT) 160-4.5 MCG/ACT AERO    levocetirizine (XYZAL ALLERGY 24HR) 5 MG tablet    montelukast (SINGULAIR) 10 MG tablet    Full PFT Study With Bronchodilator    CBC Auto Differential    IgE   3. Food allergy  EPINEPHrine (EPIPEN 2-NATHAN) 0.3 MG/0.3ML SOAJ injection   4. Non-seasonal allergic rhinitis due to pollen  levocetirizine (XYZAL ALLERGY 24HR) 5 MG tablet    montelukast (SINGULAIR) 10 MG tablet    Full PFT Study With Bronchodilator    CBC Auto Differential    IgE       All diagnostic imaging  have been personally reviewed     Plan:  Follow Up:3 months    Patient to restart Xolair. New prescription has been sent in for home injections to our [address]  Prescriptions including Xyzal, Singulair, and inhalers have been renewed  Patient states that she is no longer using fluticasone that it bothers her nose  Patient will get PFT and labs in 3 months prior to seeing me in 3 months    Patient has been instructed in how to give Xolair injections which is in the fatty part of her upper arms, legs, and abdomen. She will clean the site prior to the injection with alcohol and allowed to air dry. She will do 300 mg of Xolair once a month. Patient will report any complications to us and will have EpiPen on her. EpiPen is also been renewed    Spent 30 minutes of face-to-face time with the patient with well more than half of the visit being dedicated to the discussion of the various symptom problems, provided education of medications and disease process, as well as discussion of a therapeutic plan for each. Face-to-face education time does not include any time that may have been spent for procedures.     (Please note that portions of this note may have been completed with a voice recognition program.  Efforts were made to edit the dictation but occasionally words are mis-transcribed.)         Signed:  NADERSON Chandra CNP  9/22/2021  11:47 AM

## 2021-09-29 NOTE — PROGRESS NOTES
.  Updated prescription for Xolair 2 syringes total dose of 300 mg [facility]

## 2021-09-30 ENCOUNTER — TELEPHONE (OUTPATIENT)
Dept: ALLERGY | Age: 31
End: 2021-09-30

## 2021-10-01 NOTE — TELEPHONE ENCOUNTER
Received a fax from [address] [place] [address] for approval of Xolair for patient.   Tracking ID: [id number]  Good from 10/01/2021 through 03/31/2022

## 2021-10-07 ENCOUNTER — TELEPHONE (OUTPATIENT)
Dept: ENT CLINIC | Age: 31
End: 2021-10-07

## 2021-10-07 NOTE — TELEPHONE ENCOUNTER
Received fax from [facility] in response to PA for EpiPen sent by [facility]. The insurance has dismissed-withdrawn the PA for it stating pharmacy needs to process for a preferred NDC#. I called [place], spoke to Jero. Informed her of the [address] #'s that they need to use. Jero stated they would order one of those for the patient and get it mailed out to her.

## 2022-01-12 ENCOUNTER — HOSPITAL ENCOUNTER (EMERGENCY)
Age: 32
Discharge: HOME | End: 2022-01-12
Payer: COMMERCIAL

## 2022-01-12 VITALS
WEIGHT: 135 LBS | DIASTOLIC BLOOD PRESSURE: 74 MMHG | HEART RATE: 104 BPM | TEMPERATURE: 98.8 F | SYSTOLIC BLOOD PRESSURE: 112 MMHG | HEIGHT: 65 IN | BODY MASS INDEX: 22.49 KG/M2 | RESPIRATION RATE: 18 BRPM | OXYGEN SATURATION: 99 %

## 2022-01-12 DIAGNOSIS — U07.1 COVID-19: Primary | ICD-10-CM

## 2022-01-12 LAB
FLU A ANTIGEN: NEGATIVE
FLU B ANTIGEN: NEGATIVE
SARS-COV-2, NAAT: DETECTED

## 2022-01-12 PROCEDURE — 87804 INFLUENZA ASSAY W/OPTIC: CPT

## 2022-01-12 PROCEDURE — 99283 EMERGENCY DEPT VISIT LOW MDM: CPT

## 2022-01-12 PROCEDURE — 87635 SARS-COV-2 COVID-19 AMP PRB: CPT

## 2022-01-12 RX ORDER — ALBUTEROL SULFATE 2.5 MG/3ML
2.5 SOLUTION RESPIRATORY (INHALATION) EVERY 6 HOURS PRN
Qty: 120 EACH | Refills: 3 | Status: SHIPPED | OUTPATIENT
Start: 2022-01-12

## 2022-01-12 NOTE — Clinical Note
Farideh Nino was seen and treated in our emergency department on 1/12/2022. She may return to work on 01/21/2022. If you have any questions or concerns, please don't hesitate to call.       Darlene Betts, APRN - CNP

## 2022-01-13 NOTE — ED PROVIDER NOTES
[facility] [address] [place] [address]       Chief Complaint   Patient presents with    Cough    Nasal Congestion       Nurses Notes reviewed and I agree except as noted in the HPI. HISTORY OF PRESENT ILLNESS    Leena Barron franki 28 y.o. female who presents to the ED for evaluation of cough and nasal congestion. Symptoms started 5 days ago. No fever. HPI was provided by the patient    REVIEW OF SYSTEMS     Review of Systems   Constitutional: Positive for fatigue. Negative for chills and fever. HENT: Positive for congestion and rhinorrhea. Negative for ear discharge, ear pain and postnasal drip. Eyes: Negative for redness. Respiratory: Positive for cough. Negative for chest tightness. Cardiovascular: Negative for chest pain and leg swelling. Gastrointestinal: Negative for abdominal pain, nausea and vomiting. Genitourinary: Negative for difficulty urinating, dysuria, enuresis, flank pain and hematuria. Musculoskeletal: Negative for back pain and joint swelling. Skin: Negative for rash. Neurological: Positive for headaches. Negative for dizziness, light-headedness and numbness. Psychiatric/Behavioral: Negative for agitation, behavioral problems and confusion. All other systems negative except as noted. PAST MEDICAL HISTORY     Past Medical History:   Diagnosis Date    Anemia     on iron supplements    Asthma     Albuterol and Advair    Bladder prolapse, female, acquired     Constipation     Headache(784.0)     Interstitial cystitis     Mental disorder     bipolar    Sickle cell anemia (HCC)     Has trait       SURGICALHISTORY      has a past surgical history that includes Ankle surgery; Colonoscopy (2013); laparoscopy (7/1/13); [place] tooth extraction (2014); Cystocopy (9/28/15); Foot surgery (Bilateral, 06/22/2016); Dilation and curettage of uterus;  Foot surgery (Left, 09/27/2017); pr gastrocnemius recession (Left, 9/27/2017); other surgical history (Left, 2018); pr removal deep implant (Left, 2018); Foot surgery (Right, 2018); and pr full excis 5th metatarsal head (Right, 3/28/2018). CURRENT MEDICATIONS       Discharge Medication List as of 2022 10:32 PM      CONTINUE these medications which have NOT CHANGED    Details   omalizumab (XOLAIR) 150 MG/ML SOSY injection Inject 300 mg into the skin every 28 days Patient may have pen. MAIL TO PATIENTS HOME, Disp-2 each, R-5MAIL TO PATIENTS HOMENormal      Multiple Vitamins-Minerals (THERAPEUTIC MULTIVITAMIN-MINERALS) tablet Take 1 tablet by mouth dailyHistorical Med      albuterol sulfate HFA (PROVENTIL HFA) 108 (90 Base) MCG/ACT inhaler Inhale 2 puffs into the lungs every 6 hours as needed for Wheezing, Disp-1 each, R-3Normal      budesonide-formoterol (SYMBICORT) 160-4.5 MCG/ACT AERO Inhale 2 puffs into the lungs 2 times daily, Disp-1 each, R-5Normal      EPINEPHrine (EPIPEN 2-NATHAN) 0.3 MG/0.3ML SOAJ injection Inject 0.3 mLs into the muscle once for 1 dose Use as directed for allergic reaction, Disp-0.3 mL, R-0Normal      levocetirizine (XYZAL ALLERGY 24HR) 5 MG tablet Take 1 tablet by mouth nightly, Disp-30 tablet, R-11Normal      montelukast (SINGULAIR) 10 MG tablet Take 1 tablet by mouth nightly, Disp-30 tablet, R-11Normal      pseudoephedrine (DECONGESTANT) 30 MG tablet Take 1 tablet by mouth every 6 hours as needed for Congestion, Disp-120 tablet, R-2Normal      fluticasone (FLONASE) 50 MCG/ACT nasal spray 1 spray by Each Nostril route 2 times daily, Disp-2 Bottle, R-5Normal      acetaminophen (TYLENOL) 325 MG tablet Take 500 mg by mouth every 6 hours as needed for Pain Historical Med             ALLERGIES     has No Known Allergies. FAMILY HISTORY     She indicated that her mother is alive. She indicated that her father is alive. She indicated that the status of her maternal grandmother is unknown. She indicated that her maternal grandfather is .    family history includes Asthma in her father and mother; Depression in her mother; Heart Disease in her maternal grandmother; High Blood Pressure in her maternal grandmother. SOCIAL HISTORY       Social History     Socioeconomic History    Marital status:      Spouse name: Not on file    Number of children: Not on file    Years of education: Not on file    Highest education level: Not on file   Occupational History    Not on file   Tobacco Use    Smoking status: Never Smoker    Smokeless tobacco: Never Used   Vaping Use    Vaping Use: Former   Substance and Sexual Activity    Alcohol use: No    Drug use: No    Sexual activity: Yes     Partners: Male   Other Topics Concern    Not on file   Social History Narrative    Not on file     Social Determinants of Health     Financial Resource Strain:     Difficulty of Paying Living Expenses: Not on file   Food Insecurity:     Worried About Running Out of Food in the Last Year: Not on file    Brenda of Food in the Last Year: Not on file   Transportation Needs:     Lack of Transportation (Medical): Not on file    Lack of Transportation (Non-Medical):  Not on file   Physical Activity:     Days of Exercise per Week: Not on file    Minutes of Exercise per Session: Not on file   Stress:     Feeling of Stress : Not on file   Social Connections:     Frequency of Communication with Friends and Family: Not on file    Frequency of Social Gatherings with Friends and Family: Not on file    Attends [employer] Services: Not on file    Active Member of Clubs or Organizations: Not on file    Attends Club or Organization Meetings: Not on file    Marital Status: Not on file   Intimate Partner Violence:     Fear of Current or Ex-Partner: Not on file    Emotionally Abused: Not on file    Physically Abused: Not on file    Sexually Abused: Not on file   Housing Stability:     Unable to Pay for Housing in the Last Year: Not on file    Number of [place] in the Last Year: Not on file    Unstable Housing in the Last Year: Not on file       PHYSICAL EXAM     INITIAL VITALS:  height is 5' 5\" (1.651 m) and weight is 135 lb (61.2 kg). Her temperature is 98.8 °F (37.1 °C). Her blood pressure is 112/74 and her pulse is 104. Her respiration is 18 and oxygen saturation is 99%. Physical Exam  Constitutional:       General: She is not in acute distress. Appearance: She is well-developed. She is not diaphoretic. HENT:      Head: Normocephalic and atraumatic. Nose: Congestion and rhinorrhea present. Mouth/Throat:      Mouth: Mucous membranes are moist.      Pharynx: Oropharynx is clear. Eyes:      Conjunctiva/sclera: Conjunctivae normal.   Cardiovascular:      Pulses: Normal pulses. Pulmonary:      Effort: Pulmonary effort is normal.   Musculoskeletal:         General: No deformity. Normal range of motion. Cervical back: Normal range of motion. Skin:     General: Skin is warm and dry. Capillary Refill: Capillary refill takes less than 2 seconds. Neurological:      General: No focal deficit present. Mental Status: She is alert and oriented to person, place, and time. Psychiatric:         Mood and Affect: Mood normal.         Behavior: Behavior normal.         DIFFERENTIAL DIAGNOSIS:   flu, strep, PNA, bronchitis, viral illness      DIAGNOSTIC RESULTS     EKG: All EKG's are interpreted by the Emergency Department Physician who eithersigns or Co-signs this chart in the absence of a cardiologist.        RADIOLOGY: non-plainfilm images(s) such as CT, Ultrasound and MRI are read by the radiologist.  Plain radiographic images are visualized and preliminarily interpreted by the emergency physician unless otherwise stated below.   No orders to display         LABS:   Labs Reviewed   COVID-19, RAPID - Abnormal; Notable for the following components:       Result Value    SARS-CoV-2, NAAT DETECTED (*)     All other components within normal limits   RAPID INFLUENZA A/B ANTIGENS       EMERGENCY DEPARTMENT COURSE:   Vitals:    Vitals:    01/12/22 2104 01/12/22 2110 01/12/22 2247   BP: 114/78 114/78 112/74   Pulse: 100 99 104   Resp: 17 16 18   Temp: 98 °F (36.7 °C) 98.4 °F (36.9 °C) 98.8 °F (37.1 °C)   TempSrc: Oral     SpO2: 100% 100% 99%   Weight:  135 lb (61.2 kg)    Height:  5' 5\" (1.651 m)                              MDM    Patient was seen in the ER for COVID testing. The patient is educated on symptomatic support and exposure precautions. Patient's test is positive. They are advised on quarantine precautions and follow up care as well as return precautions. They are discharged home in stable condition. Medications - No data to display    Please note that the patient was evaluated during a pandemic. All efforts were made for HIPPA compliance as well as provision of appropriate care. Patient was seen independently by myself. The patient's final impression and disposition and plan was determined by myself. Strict return precautions and follow up instructions were discussed with the patient prior to discharge, with which the patient agrees. Physical assessment findings, diagnostic testing(s) if applicable, and vital signs reviewed with patient/patient representative. Questions answered. Medications asdirected, including OTC medications for supportive care. Education provided on medications. Differential diagnosis(s) discussed with patient/patient representative. Home care/self care instructions reviewed withpatient/patient representative. Patient is to follow-up with family care provider in 2-3 days if no improvement. Patient is to go to the emergency department if symptoms worsen. Patient/patient representative isaware of care plan, questions answered, verbalizes understanding and is in agreement.      CRITICAL CARE:   None    CONSULTS:  None    PROCEDURES:  None    FINAL IMPRESSION     1. COVID-19          DISPOSITION/PLAN DISPOSITION Decision To Discharge 01/12/2022 10:32:06 PM      PATIENT REFERREDTO:  Hailee Patel MD  [address] [facility]  [phone]    Schedule an appointment as soon as possible for a visit in 2 days  For follow up      DISCHARGE MEDICATIONS:  Discharge Medication List as of 1/12/2022 10:32 PM          (Please note that portions of this note were completed with a voice recognition program.  Efforts were made to edit the dictations but occasionally words are mis-transcribed.)         ANDERSON Golden CNP, APRN - CNP  01/14/22 [id number]

## 2022-01-14 ASSESSMENT — ENCOUNTER SYMPTOMS
ABDOMINAL PAIN: 0
CHEST TIGHTNESS: 0
EYE REDNESS: 0
COUGH: 1
VOMITING: 0
BACK PAIN: 0
NAUSEA: 0
RHINORRHEA: 1

## 2022-02-02 ENCOUNTER — HOSPITAL ENCOUNTER (OUTPATIENT)
Dept: PULMONOLOGY | Age: 32
Discharge: HOME | End: 2022-02-02
Payer: COMMERCIAL

## 2022-02-02 DIAGNOSIS — J30.1 NON-SEASONAL ALLERGIC RHINITIS DUE TO POLLEN: ICD-10-CM

## 2022-02-02 DIAGNOSIS — J45.40 MODERATE PERSISTENT ASTHMA, UNSPECIFIED WHETHER COMPLICATED: ICD-10-CM

## 2022-02-02 PROCEDURE — 94726 PLETHYSMOGRAPHY LUNG VOLUMES: CPT

## 2022-02-02 PROCEDURE — 94729 DIFFUSING CAPACITY: CPT

## 2022-02-02 PROCEDURE — 94060 EVALUATION OF WHEEZING: CPT

## 2022-02-10 ENCOUNTER — TELEPHONE (OUTPATIENT)
Dept: ALLERGY | Age: 32
End: 2022-02-10

## 2022-02-17 RX ORDER — FLUTICASONE PROPIONATE 50 MCG
SPRAY, SUSPENSION (ML) NASAL
Qty: 16 G | Refills: 0 | Status: SHIPPED | OUTPATIENT
Start: 2022-02-17 | End: 2022-03-14

## 2022-03-14 RX ORDER — FLUTICASONE PROPIONATE 50 MCG
SPRAY, SUSPENSION (ML) NASAL
Qty: 16 G | Refills: 0 | Status: SHIPPED | OUTPATIENT
Start: 2022-03-14

## 2022-03-30 ENCOUNTER — TELEPHONE (OUTPATIENT)
Dept: ALLERGY | Age: 32
End: 2022-03-30

## 2022-03-30 NOTE — TELEPHONE ENCOUNTER
Received fax to complete PA on levocetirizine dihydrochloride 5 mg tablets. Completed PA on covermymeds.

## 2022-03-31 ENCOUNTER — HOSPITAL ENCOUNTER (EMERGENCY)
Age: 32
Discharge: HOME | End: 2022-03-31
Payer: COMMERCIAL

## 2022-03-31 VITALS
RESPIRATION RATE: 14 BRPM | WEIGHT: 128 LBS | BODY MASS INDEX: 21.3 KG/M2 | TEMPERATURE: 98.2 F | HEART RATE: 70 BPM | DIASTOLIC BLOOD PRESSURE: 72 MMHG | SYSTOLIC BLOOD PRESSURE: 122 MMHG | OXYGEN SATURATION: 97 %

## 2022-03-31 DIAGNOSIS — R81 GLYCOSURIA: ICD-10-CM

## 2022-03-31 DIAGNOSIS — R35.0 URINARY FREQUENCY: Primary | ICD-10-CM

## 2022-03-31 DIAGNOSIS — N30.10 INTERSTITIAL CYSTITIS: ICD-10-CM

## 2022-03-31 LAB
BILIRUBIN URINE: NEGATIVE
BLOOD, URINE: NEGATIVE
CHARACTER, URINE: CLEAR
COLOR: YELLOW
GLUCOSE URINE: 100 MG/DL
KETONES, URINE: NEGATIVE
LEUKOCYTE ESTERASE, URINE: NEGATIVE
NITRITE, URINE: NEGATIVE
PH UA: 7.5 (ref 5–9)
PREGNANCY, URINE: NEGATIVE
PROTEIN UA: NEGATIVE MG/DL
SPECIFIC GRAVITY UA: 1.02 (ref 1–1.03)
UROBILINOGEN, URINE: 0.2 EU/DL (ref 0.2–1)

## 2022-03-31 PROCEDURE — 84703 CHORIONIC GONADOTROPIN ASSAY: CPT

## 2022-03-31 PROCEDURE — 99213 OFFICE O/P EST LOW 20 MIN: CPT

## 2022-03-31 PROCEDURE — 99213 OFFICE O/P EST LOW 20 MIN: CPT | Performed by: NURSE PRACTITIONER

## 2022-03-31 PROCEDURE — 81003 URINALYSIS AUTO W/O SCOPE: CPT

## 2022-03-31 RX ORDER — PHENAZOPYRIDINE HYDROCHLORIDE 200 MG/1
200 TABLET, FILM COATED ORAL 3 TIMES DAILY PRN
Qty: 9 TABLET | Refills: 0 | Status: SHIPPED | OUTPATIENT
Start: 2022-03-31 | End: 2022-04-03

## 2022-03-31 ASSESSMENT — ENCOUNTER SYMPTOMS
TROUBLE SWALLOWING: 0
ALLERGIC/IMMUNOLOGIC NEGATIVE: 1
EYE DISCHARGE: 0
SHORTNESS OF BREATH: 0
EYE REDNESS: 0
NAUSEA: 0
WHEEZING: 0
EYE PAIN: 0
ABDOMINAL PAIN: 0
SORE THROAT: 0
DIARRHEA: 0
COUGH: 0
CONSTIPATION: 0
BACK PAIN: 0
VOMITING: 0
RHINORRHEA: 0

## 2022-03-31 ASSESSMENT — PAIN DESCRIPTION - PAIN TYPE: TYPE: ACUTE PAIN

## 2022-03-31 ASSESSMENT — PAIN DESCRIPTION - DESCRIPTORS: DESCRIPTORS: BURNING

## 2022-03-31 ASSESSMENT — PAIN SCALES - GENERAL: PAINLEVEL_OUTOF10: 6

## 2022-03-31 NOTE — TELEPHONE ENCOUNTER
Received fax from [address] [place] [address] Rd for denial on levocetirizine dihydrochloride 5 mg tablet. Provider informed and states to tell pt to take over the counter. Will inform pt.

## 2022-03-31 NOTE — TELEPHONE ENCOUNTER
Called pt and informed her pa was denied for levocetirizine. Informed pt provider stated she can buy over the counter. Pt verbalizes understanding and has not other questions or concerns at this time.

## 2022-03-31 NOTE — ED PROVIDER NOTES
[place]  Urgent Care Encounter      CHIEF COMPLAINT       Chief Complaint   Patient presents with    Urinary Tract Infection     burning       Nurses Notes reviewed and I agree except as noted in the HPI. HISTORY OF PRESENT ILLNESS   Delbert Goins is a 28 y.o. female who presents with 4 days of urinary frequency, burning and dysuria with occasional low back pain. Patient denies hematuria, fever, chills. Patient does have a history of interstitial cystitis which complicates her evaluation of her urinary status. REVIEW OF SYSTEMS     Review of Systems   Constitutional: Negative for activity change, fatigue and fever. HENT: Negative for congestion, ear pain, rhinorrhea, sore throat and trouble swallowing. Eyes: Negative for pain, discharge and redness. Respiratory: Negative for cough, shortness of breath and wheezing. Cardiovascular: Negative. Gastrointestinal: Negative for abdominal pain, constipation, diarrhea, nausea and vomiting. Endocrine: Negative. Genitourinary: Positive for dysuria, frequency, pelvic pain and urgency. Musculoskeletal: Negative for arthralgias, back pain and myalgias. Skin: Negative for rash. Allergic/Immunologic: Negative. Neurological: Negative for dizziness, tremors, weakness and headaches. Hematological: Negative. Psychiatric/Behavioral: Negative for dysphoric mood and sleep disturbance. The patient is not nervous/anxious. PAST MEDICAL HISTORY         Diagnosis Date    Anemia     on iron supplements    Asthma     Albuterol and Advair    Bladder prolapse, female, acquired     Constipation     Headache(784.0)     Interstitial cystitis     Mental disorder     bipolar    Sickle cell anemia ([facility] [address]     Has trait       SURGICAL HISTORY     Patient  has a past surgical history that includes Ankle surgery; Colonoscopy (2013); laparoscopy (7/1/13); [place] tooth extraction (2014); Cystocopy (9/28/15);  Foot surgery (Bilateral, 06/22/2016); Dilation and curettage of uterus; Foot surgery (Left, 09/27/2017); pr gastrocnemius recession (Left, 9/27/2017); other surgical history (Left, 02/14/2018); pr removal deep implant (Left, 2/14/2018); Foot surgery (Right, 03/28/2018); and pr full excis 5th metatarsal head (Right, 3/28/2018). CURRENT MEDICATIONS       Discharge Medication List as of 3/31/2022  5:25 PM      CONTINUE these medications which have NOT CHANGED    Details   fluticasone (FLONASE) 50 MCG/ACT nasal spray SHAKE LIQUID AND USE 1 SPRAY IN EACH NOSTRIL TWICE DAILY, Disp-16 g, R-0Msg From [facility]: Dr. Gery Jeans RequestedNormal      albuterol (PROVENTIL) (2.5 MG/3ML) 0.083% nebulizer solution Take 3 mLs by nebulization every 6 hours as needed for Wheezing, Disp-120 each, R-3Print      omalizumab Jenna Maw) 150 MG/ML SOSY injection Inject 300 mg into the skin every 28 days Patient may have pen.  MAIL TO PATIENTS HOME, Disp-2 each, R-5MAIL TO PATIENTS HOMENormal      Multiple Vitamins-Minerals (THERAPEUTIC MULTIVITAMIN-MINERALS) tablet Take 1 tablet by mouth dailyHistorical Med      albuterol sulfate HFA (PROVENTIL HFA) 108 (90 Base) MCG/ACT inhaler Inhale 2 puffs into the lungs every 6 hours as needed for Wheezing, Disp-1 each, R-3Normal      budesonide-formoterol (SYMBICORT) 160-4.5 MCG/ACT AERO Inhale 2 puffs into the lungs 2 times daily, Disp-1 each, R-5Normal      EPINEPHrine (EPIPEN 2-NATHAN) 0.3 MG/0.3ML SOAJ injection Inject 0.3 mLs into the muscle once for 1 dose Use as directed for allergic reaction, Disp-0.3 mL, R-0Normal      levocetirizine (XYZAL ALLERGY 24HR) 5 MG tablet Take 1 tablet by mouth nightly, Disp-30 tablet, R-11Normal      montelukast (SINGULAIR) 10 MG tablet Take 1 tablet by mouth nightly, Disp-30 tablet, R-11Normal      pseudoephedrine (DECONGESTANT) 30 MG tablet Take 1 tablet by mouth every 6 hours as needed for Congestion, Disp-120 tablet, R-2Normal      acetaminophen (TYLENOL) 325 MG tablet Take 500 mg by mouth every 6 hours as needed for Pain Historical Med             ALLERGIES     Patient is has No Known Allergies. FAMILY HISTORY     Patient'sfamily history includes Asthma in her father and mother; Depression in her mother; Heart Disease in her maternal grandmother; High Blood Pressure in her maternal grandmother. SOCIAL HISTORY     Patient  reports that she has never smoked. She has never used smokeless tobacco. She reports that she does not drink alcohol and does not use drugs. PHYSICAL EXAM     ED TRIAGE VITALS  BP: 122/72, Temp: 98.2 °F (36.8 °C), Pulse: 70, Resp: 14, SpO2: 97 %  Physical Exam  Constitutional:       General: She is not in acute distress. Appearance: She is well-developed. She is not diaphoretic. HENT:      Right Ear: External ear normal.      Left Ear: External ear normal.      Nose: Nose normal.   Eyes:      General:         Right eye: No discharge. Left eye: No discharge. Conjunctiva/sclera: Conjunctivae normal.      Pupils: Pupils are equal, round, and reactive to light. Neck:      Vascular: No JVD. Cardiovascular:      Rate and Rhythm: Normal rate and regular rhythm. Pulmonary:      Effort: Pulmonary effort is normal. No respiratory distress. Musculoskeletal:         General: No tenderness or deformity. Normal range of motion. Cervical back: Normal range of motion. Skin:     General: Skin is warm and dry. Capillary Refill: Capillary refill takes less than 2 seconds. Coloration: Skin is not pale. Findings: No erythema or rash. Neurological:      Mental Status: She is alert and oriented to person, place, and time. Coordination: Coordination normal.   Psychiatric:         Behavior: Behavior normal.         Thought Content:  Thought content normal.         Judgment: Judgment normal.         DIAGNOSTIC RESULTS   Labs:   Results for orders placed or performed during the hospital encounter of 03/31/22   Urinalysis   Result Value Ref Range    Glucose, Ur 100 (A) NEGATIVE mg/dl    Bilirubin Urine Negative NEGATIVE    Ketones, Urine Negative NEGATIVE    Specific Gravity, UA 1.020 1.002 - 1.030    Blood, Urine Negative NEGATIVE    pH, UA 7.50 5.0 - 9.0    Protein, UA Negative NEGATIVE mg/dl    Urobilinogen, Urine 0.20 0.2 - 1.0 eu/dl    Nitrite, Urine Negative NEGATIVE    Leukocyte Esterase, Urine Negative NEGATIVE    Color, UA Yellow STRAW-YELLOW    Character, Urine Clear CLEAR-SL CLOUD   Pregnancy, Urine   Result Value Ref Range    Pregnancy, Urine NEGATIVE NEGATIVE       IMAGING:    URGENT CARE COURSE:     Vitals:    03/31/22 1701   BP: 122/72   Pulse: 70   Resp: 14   Temp: 98.2 °F (36.8 °C)   SpO2: 97%   Weight: 128 lb (58.1 kg)       Medications - No data to display  PROCEDURES:  None  FINAL IMPRESSION      1. Urinary frequency    2. Interstitial cystitis    3. Glycosuria        DISPOSITION/PLAN   DISPOSITION    Urinalysis not consistent with UTI. Discussed with patient this may be an exacerbation of her IC and she can follow-up with her PCP as needed. I did advise her that there was a urine finding of glucose but this is something to just keep an eye on at this point. Patient was prescribed Pyridium to see if this helps with her bladder cramping.     PATIENT REFERRED TO:  Jared Caballero MD  [address] [place] [address]  [phone]      As needed    DISCHARGE MEDICATIONS:  Discharge Medication List as of 3/31/2022  5:25 PM        Discharge Medication List as of 3/31/2022  5:25 PM          ANDERSON Bird - KIM Díaz, ANDERSON - CNP  03/31/22 [facility], ANDERSON - CNP  03/31/22 [address], ANDERSON - [facility]  03/31/22 [id number]

## 2022-04-25 ENCOUNTER — TELEPHONE (OUTPATIENT)
Dept: ALLERGY | Age: 32
End: 2022-04-25

## 2022-04-25 NOTE — TELEPHONE ENCOUNTER
Received fax from [address] [place] [address] Rd approving PA for 4/25/22 until 4/25/23. Approval number [id number]. Fax scanned under media.

## 2022-04-25 NOTE — TELEPHONE ENCOUNTER
Called patient notify her to come in every 6 months to be seen. Patient states she is doing well with Xolair and wants to continue Xolair. Patient has chronic idiopathic urticaria and asthma. Both every responded in a positive manner to the Xolair. Patient denies any problems with Xolair.   Reports that her urticaria has decreased

## 2022-06-07 ENCOUNTER — HOSPITAL ENCOUNTER (EMERGENCY)
Age: 32
Discharge: HOME | End: 2022-06-07
Payer: COMMERCIAL

## 2022-06-07 VITALS
HEART RATE: 93 BPM | OXYGEN SATURATION: 99 % | SYSTOLIC BLOOD PRESSURE: 119 MMHG | RESPIRATION RATE: 14 BRPM | BODY MASS INDEX: 21.8 KG/M2 | WEIGHT: 131 LBS | TEMPERATURE: 98.4 F | DIASTOLIC BLOOD PRESSURE: 78 MMHG

## 2022-06-07 DIAGNOSIS — N30.00 ACUTE CYSTITIS WITHOUT HEMATURIA: Primary | ICD-10-CM

## 2022-06-07 LAB
BACTERIA: ABNORMAL /HPF
BILIRUBIN URINE: ABNORMAL
BLOOD, URINE: ABNORMAL
CASTS 2: ABNORMAL /LPF
CASTS UA: ABNORMAL /LPF
CHARACTER, URINE: ABNORMAL
COLOR: ABNORMAL
CRYSTALS, UA: ABNORMAL
EPITHELIAL CELLS, UA: ABNORMAL /HPF
GLUCOSE URINE: ABNORMAL MG/DL
ICTOTEST: NEGATIVE
KETONES, URINE: ABNORMAL
LEUKOCYTE ESTERASE, URINE: ABNORMAL
MISCELLANEOUS 2: ABNORMAL
NITRITE, URINE: ABNORMAL
PH UA: ABNORMAL (ref 5–9)
PROTEIN UA: ABNORMAL
RBC URINE: ABNORMAL /HPF
RENAL EPITHELIAL, UA: ABNORMAL
SPECIFIC GRAVITY, URINE: ABNORMAL (ref 1–1.03)
UROBILINOGEN, URINE: ABNORMAL EU/DL (ref 0–1)
WBC UA: ABNORMAL /HPF
YEAST: ABNORMAL

## 2022-06-07 PROCEDURE — 87205 SMEAR GRAM STAIN: CPT

## 2022-06-07 PROCEDURE — 87070 CULTURE OTHR SPECIMN AEROBIC: CPT

## 2022-06-07 PROCEDURE — 81001 URINALYSIS AUTO W/SCOPE: CPT

## 2022-06-07 PROCEDURE — 99213 OFFICE O/P EST LOW 20 MIN: CPT | Performed by: NURSE PRACTITIONER

## 2022-06-07 PROCEDURE — 87086 URINE CULTURE/COLONY COUNT: CPT

## 2022-06-07 PROCEDURE — 99213 OFFICE O/P EST LOW 20 MIN: CPT

## 2022-06-07 RX ORDER — NITROFURANTOIN 25; 75 MG/1; MG/1
100 CAPSULE ORAL 2 TIMES DAILY
Qty: 10 CAPSULE | Refills: 0 | Status: SHIPPED | OUTPATIENT
Start: 2022-06-07 | End: 2022-06-12

## 2022-06-07 ASSESSMENT — PAIN - FUNCTIONAL ASSESSMENT: PAIN_FUNCTIONAL_ASSESSMENT: NONE - DENIES PAIN

## 2022-06-07 NOTE — ED TRIAGE NOTES
Patient c/o burning with urination x 2 days, sexual intercourse painful, patients states feels like she is torn vaginally. Discharge white. used Boric acid suspositiory.

## 2022-06-07 NOTE — ED PROVIDER NOTES
[facility]  Urgent Care Encounter      CHIEF COMPLAINT       Chief Complaint   Patient presents with    Urinary Tract Infection    Vaginal Discharge     white       Nurses Notes reviewed and I agree except as noted in the HPI. HISTORY OFPRESENT ILLNESS   Tasneem Benitez is a 28 y.o. The history is provided by the patient. No  was used. Dysuria   This is a new problem. The current episode started yesterday. The problem occurs every urination. The problem has not changed since onset. The quality of the pain is described as burning. The pain is at a severity of 2/10. The pain is mild. There has been no fever. She is sexually active (recent traumatic sexual incounter, partner used no lub or assisted iwth lubrication prior to insertion. , this was a consented act.). There is no history of pyelonephritis. Associated symptoms include hesitancy and possible pregnancy. Pertinent negatives include no chills, no sweats, no nausea, no vomiting, no discharge, no frequency, no hematuria, no urgency and no flank pain. She has tried increased fluids for the symptoms. Her past medical history does not include kidney stones, single kidney, urological procedure, recurrent UTIs, urinary stasis or catheterization. REVIEW OF SYSTEMS     Review of Systems   Constitutional: Negative for chills. Respiratory: Negative for apnea, cough, choking, chest tightness, shortness of breath, wheezing and stridor. Cardiovascular: Negative for chest pain, palpitations and leg swelling. Gastrointestinal: Negative for nausea and vomiting. Genitourinary: Positive for dysuria, genital sores, hesitancy, vaginal discharge and vaginal pain. Negative for decreased urine volume, difficulty urinating, dyspareunia, enuresis, flank pain, frequency, hematuria, menstrual problem, pelvic pain, urgency and vaginal bleeding.         Fer Yeager reports a skin tear inside vaginal cavity       PAST MEDICAL HISTORY         Diagnosis Date    Anemia     on iron supplements    Asthma     Albuterol and Advair    Bladder prolapse, female, acquired     Constipation     Headache(784.0)     Interstitial cystitis     Mental disorder     bipolar    Sickle cell anemia (HCC)     Has trait       SURGICAL HISTORY     Patient  has a past surgical history that includes Ankle surgery; Colonoscopy (2013); laparoscopy (7/1/13); [place] tooth extraction (2014); Cystocopy (9/28/15); Foot surgery (Bilateral, 06/22/2016); Dilation and curettage of uterus; Foot surgery (Left, 09/27/2017); pr gastrocnemius recession (Left, 9/27/2017); other surgical history (Left, 02/14/2018); pr removal deep implant (Left, 2/14/2018); Foot surgery (Right, 03/28/2018); and pr full excis 5th metatarsal head (Right, 3/28/2018). CURRENT MEDICATIONS       Discharge Medication List as of 6/7/2022  6:18 PM      CONTINUE these medications which have NOT CHANGED    Details   fluticasone (FLONASE) 50 MCG/ACT nasal spray SHAKE LIQUID AND USE 1 SPRAY IN EACH NOSTRIL TWICE DAILY, Disp-16 g, R-0Msg [facility]: Dr. Manish Hazel RequestedNormal      albuterol (PROVENTIL) (2.5 MG/3ML) 0.083% nebulizer solution Take 3 mLs by nebulization every 6 hours as needed for Wheezing, Disp-120 each, R-3Print      omalizumab Gloriann Deep) 150 MG/ML SOSY injection Inject 300 mg into the skin every 28 days Patient may have pen.  MAIL TO PATIENTS HOME, Disp-2 each, R-5MAIL TO PATIENTS HOMENormal      Multiple Vitamins-Minerals (THERAPEUTIC MULTIVITAMIN-MINERALS) tablet Take 1 tablet by mouth dailyHistorical Med      albuterol sulfate HFA (PROVENTIL HFA) 108 (90 Base) MCG/ACT inhaler Inhale 2 puffs into the lungs every 6 hours as needed for Wheezing, Disp-1 each, R-3Normal      budesonide-formoterol (SYMBICORT) 160-4.5 MCG/ACT AERO Inhale 2 puffs into the lungs 2 times daily, Disp-1 each, R-5Normal      EPINEPHrine (EPIPEN 2-NATHAN) 0.3 MG/0.3ML SOAJ injection Inject 0.3 mLs into the muscle once for 1 dose Use as directed for allergic reaction, Disp-0.3 mL, R-0Normal      levocetirizine (XYZAL ALLERGY 24HR) 5 MG tablet Take 1 tablet by mouth nightly, Disp-30 tablet, R-11Normal      montelukast (SINGULAIR) 10 MG tablet Take 1 tablet by mouth nightly, Disp-30 tablet, R-11Normal      pseudoephedrine (DECONGESTANT) 30 MG tablet Take 1 tablet by mouth every 6 hours as needed for Congestion, Disp-120 tablet, R-2Normal      acetaminophen (TYLENOL) 325 MG tablet Take 500 mg by mouth every 6 hours as needed for Pain Historical Med             ALLERGIES     Patient is has No Known Allergies. FAMILY HISTORY     Patient's family history includes Asthma in her father and mother; Depression in her mother; Heart Disease in her maternal grandmother; High Blood Pressure in her maternal grandmother. SOCIAL HISTORY     Patient  reports that she has never smoked. She has never used smokeless tobacco. She reports that she does not drink alcohol and does not use drugs. PHYSICAL EXAM     ED TRIAGE VITALS  BP: 119/78, Temp: 98.4 °F (36.9 °C), Heart Rate: 93, Resp: 14, SpO2: 99 %  Physical Exam  Vitals and nursing note reviewed. Constitutional:       General: She is not in acute distress. Appearance: Normal appearance. She is not ill-appearing, toxic-appearing or diaphoretic. HENT:      Head: Normocephalic and atraumatic. Right Ear: External ear normal.      Left Ear: External ear normal.   Eyes:      Extraocular Movements: Extraocular movements intact. Conjunctiva/sclera: Conjunctivae normal.   Pulmonary:      Effort: Pulmonary effort is normal.   Musculoskeletal:         General: Normal range of motion. Cervical back: Normal range of motion. Skin:     General: Skin is warm. Neurological:      General: No focal deficit present. Mental Status: She is alert and oriented to person, place, and time.    Psychiatric:         Mood and Affect: Mood normal.         Behavior: Behavior normal. Thought Content: Thought content normal.         Judgment: Judgment normal.         DIAGNOSTIC RESULTS   Labs:  Results for orders placed or performed during the hospital encounter of 06/07/22   Culture, Reflexed, Urine    Specimen: Urine   Result Value Ref Range    Urine Culture Reflex No growth-preliminary     Urine with Reflexed Micro   Result Value Ref Range    Glucose, Ur see below NEGATIVE mg/dl    Bilirubin Urine see below NEGATIVE    Ketones, Urine see below NEGATIVE    Specific Gravity, Urine see below 1.002 - 1.030    Blood, Urine see below NEGATIVE    pH, UA see below 5.0 - 9.0    Protein, UA see below NEGATIVE    Urobilinogen, Urine see below 0.0 - 1.0 eu/dl    Nitrite, Urine see below NEGATIVE    Leukocyte Esterase, Urine see below NEGATIVE    Color, UA ORANGE (A) STRAW-YELLOW    Character, Urine CLOUDY (A) CLEAR-SL CLOUD    RBC, UA 5-10 0-2/hpf /hpf    WBC, UA 15-25 0-4/hpf /hpf    Epithelial Cells, UA 10-15 3-5/hpf /hpf    Bacteria, UA NONE SEEN FEW/NONE SEEN /hpf    Casts UA 4-8 HYALINE NONE SEEN /lpf    Crystals, UA NONE SEEN NONE SEEN    Renal Epithelial, UA NONE SEEN NONE SEEN    Yeast, UA NONE SEEN NONE SEEN    CASTS 2 NONE SEEN NONE SEEN /lpf    MISCELLANEOUS 2 NONE SEEN    Bile Acids, Total   Result Value Ref Range    Ictotest NEGATIVE NEGATIVE       IMAGING:  No orders to display     URGENT CARE COURSE:     Vitals:    06/07/22 1751   BP: 119/78   Pulse: 93   Resp: 14   Temp: 98.4 °F (36.9 °C)   TempSrc: Temporal   SpO2: 99%   Weight: 131 lb (59.4 kg)       Medications - No data to display  PROCEDURES:  None  FINAL IMPRESSION      1. Acute cystitis without hematuria        DISPOSITION/PLAN   Decision To Discharge       Patient or Patient designated representative was advised to drink plenty of water or fluids and take medication as prescribed. The patient or Patient designated representative is advised to monitor for any changes in pain, development of high fever, chills, persistent vomiting, development of increasing back or flank pain or increase in hematuria the patient is advised to go to the emergency department for reevaluation and further follow-up if they wouldn't notice any of the above symptoms. The patient or Patient designated representative was also advised to follow up with family doctor or primary care provider after the antibiotic is completed for repeat urinalysis. The patient did verbalize understanding of discharge instructions and is agreeable to the treatment plan. The patient left ambulatory without any changes or concerns in stable condition.     PATIENT REFERRED TO:  Saranya Avilez MD  [address] [place] [address]  [phone]    Call   As needed    DISCHARGE MEDICATIONS:  Discharge Medication List as of 6/7/2022  6:18 PM      START taking these medications    Details   nitrofurantoin, macrocrystal-monohydrate, (MACROBID) 100 MG capsule Take 1 capsule by mouth 2 times daily for 5 days, Disp-10 capsule, R-0Normal           Discharge Medication List as of 6/7/2022  6:18 PM          ANDERSON Garza  21., APRN - CNP  06/08/22 8062

## 2022-06-08 PROCEDURE — 99283 EMERGENCY DEPT VISIT LOW MDM: CPT

## 2022-06-08 ASSESSMENT — ENCOUNTER SYMPTOMS
NAUSEA: 0
SHORTNESS OF BREATH: 0
WHEEZING: 0
CHEST TIGHTNESS: 0
STRIDOR: 0
APNEA: 0
COUGH: 0
CHOKING: 0
VOMITING: 0

## 2022-06-09 ENCOUNTER — HOSPITAL ENCOUNTER (EMERGENCY)
Age: 32
Discharge: HOME | End: 2022-06-09
Attending: EMERGENCY MEDICINE
Payer: COMMERCIAL

## 2022-06-09 ENCOUNTER — HOSPITAL ENCOUNTER (OUTPATIENT)
Age: 32
Setting detail: SPECIMEN
Discharge: HOME | End: 2022-06-09

## 2022-06-09 VITALS
RESPIRATION RATE: 16 BRPM | OXYGEN SATURATION: 100 % | HEART RATE: 96 BPM | TEMPERATURE: 98.6 F | SYSTOLIC BLOOD PRESSURE: 132 MMHG | DIASTOLIC BLOOD PRESSURE: 80 MMHG

## 2022-06-09 DIAGNOSIS — R10.2 VAGINAL PAIN: Primary | ICD-10-CM

## 2022-06-09 DIAGNOSIS — Z20.2 POSSIBLE EXPOSURE TO STD: ICD-10-CM

## 2022-06-09 LAB
ALBUMIN SERPL-MCNC: 4.3 G/DL (ref 3.5–5.1)
ALP BLD-CCNC: 61 U/L (ref 38–126)
ALT SERPL-CCNC: 13 U/L (ref 11–66)
ANION GAP SERPL CALCULATED.3IONS-SCNC: 11 MEQ/L (ref 8–16)
AST SERPL-CCNC: 19 U/L (ref 5–40)
BACTERIA: ABNORMAL /HPF
BASOPHILS # BLD: 0.4 %
BASOPHILS ABSOLUTE: 0 THOU/MM3 (ref 0–0.1)
BILIRUB SERPL-MCNC: 0.3 MG/DL (ref 0.3–1.2)
BILIRUBIN URINE: NEGATIVE
BLOOD, URINE: NEGATIVE
BUN BLDV-MCNC: 13 MG/DL (ref 7–22)
CALCIUM SERPL-MCNC: 9.2 MG/DL (ref 8.5–10.5)
CANDIDA SPECIES, DNA PROBE: NEGATIVE
CASTS 2: ABNORMAL /LPF
CASTS UA: ABNORMAL /LPF
CHARACTER, URINE: ABNORMAL
CHLAMYDIA TRACHOMATIS BY RT-PCR: NOT DETECTED
CHLORIDE BLD-SCNC: 104 MEQ/L (ref 98–111)
CO2: 23 MEQ/L (ref 23–33)
COLOR: YELLOW
CREAT SERPL-MCNC: 0.7 MG/DL (ref 0.4–1.2)
CRYSTALS, UA: ABNORMAL
CT/NG SOURCE: NORMAL
EOSINOPHIL # BLD: 2.3 %
EOSINOPHILS ABSOLUTE: 0.1 THOU/MM3 (ref 0–0.4)
EPITHELIAL CELLS, UA: ABNORMAL /HPF
ERYTHROCYTE [DISTWIDTH] IN BLOOD BY AUTOMATED COUNT: 13.3 % (ref 11.5–14.5)
ERYTHROCYTE [DISTWIDTH] IN BLOOD BY AUTOMATED COUNT: 42.1 FL (ref 35–45)
GARDNERELLA VAGINALIS, DNA PROBE: POSITIVE
GFR SERPL CREATININE-BSD FRML MDRD: > 90 ML/MIN/1.73M2
GLUCOSE BLD-MCNC: 70 MG/DL (ref 70–108)
GLUCOSE URINE: NEGATIVE MG/DL
HCT VFR BLD CALC: 42.4 % (ref 37–47)
HEMOGLOBIN: 13 GM/DL (ref 12–16)
IMMATURE GRANS (ABS): 0.01 THOU/MM3 (ref 0–0.07)
IMMATURE GRANULOCYTES: 0.2 %
KETONES, URINE: NEGATIVE
KOH PREP: NORMAL
LEUKOCYTE ESTERASE, URINE: ABNORMAL
LYMPHOCYTES # BLD: 44.6 %
LYMPHOCYTES ABSOLUTE: 2.3 THOU/MM3 (ref 1–4.8)
MAGNESIUM: 1.9 MG/DL (ref 1.6–2.4)
MCH RBC QN AUTO: 26.6 PG (ref 26–33)
MCHC RBC AUTO-ENTMCNC: 30.7 GM/DL (ref 32.2–35.5)
MCV RBC AUTO: 86.9 FL (ref 81–99)
MISCELLANEOUS 2: ABNORMAL
MONOCYTES # BLD: 9.6 %
MONOCYTES ABSOLUTE: 0.5 THOU/MM3 (ref 0.4–1.3)
NEISSERIA GONORRHOEAE BY RT-PCR: NOT DETECTED
NITRITE, URINE: NEGATIVE
NUCLEATED RED BLOOD CELLS: 0 /100 WBC
ORGANISM: ABNORMAL
OSMOLALITY CALCULATION: 274.2 MOSMOL/KG (ref 275–300)
PH UA: 8 (ref 5–9)
PLATELET # BLD: 258 THOU/MM3 (ref 130–400)
PMV BLD AUTO: 10.8 FL (ref 9.4–12.4)
POTASSIUM SERPL-SCNC: 4.4 MEQ/L (ref 3.5–5.2)
PREGNANCY, SERUM: NEGATIVE
PROTEIN UA: NEGATIVE
RBC # BLD: 4.88 MILL/MM3 (ref 4.2–5.4)
RBC URINE: ABNORMAL /HPF
RENAL EPITHELIAL, UA: ABNORMAL
SEG NEUTROPHILS: 42.9 %
SEGMENTED NEUTROPHILS ABSOLUTE COUNT: 2.2 THOU/MM3 (ref 1.8–7.7)
SODIUM BLD-SCNC: 138 MEQ/L (ref 135–145)
SOURCE: ABNORMAL
SPECIFIC GRAVITY, URINE: 1.02 (ref 1–1.03)
T. PALLIDUM, IGG: NONREACTIVE
TOTAL PROTEIN: 7.6 G/DL (ref 6.1–8)
TRICHOMONAS PREP: NORMAL
TRICHOMONAS VAGINALIS DNA: NEGATIVE
URINE CULTURE REFLEX: ABNORMAL
UROBILINOGEN, URINE: 1 EU/DL (ref 0–1)
WBC # BLD: 5.2 THOU/MM3 (ref 4.8–10.8)
WBC UA: ABNORMAL /HPF
YEAST: ABNORMAL

## 2022-06-09 PROCEDURE — 83735 ASSAY OF MAGNESIUM: CPT

## 2022-06-09 PROCEDURE — 87591 N.GONORRHOEAE DNA AMP PROB: CPT

## 2022-06-09 PROCEDURE — 87210 SMEAR WET MOUNT SALINE/INK: CPT

## 2022-06-09 PROCEDURE — 84703 CHORIONIC GONADOTROPIN ASSAY: CPT

## 2022-06-09 PROCEDURE — 80053 COMPREHEN METABOLIC PANEL: CPT

## 2022-06-09 PROCEDURE — 85025 COMPLETE CBC W/AUTO DIFF WBC: CPT

## 2022-06-09 PROCEDURE — 87077 CULTURE AEROBIC IDENTIFY: CPT

## 2022-06-09 PROCEDURE — 87070 CULTURE OTHR SPECIMN AEROBIC: CPT

## 2022-06-09 PROCEDURE — 87220 TISSUE EXAM FOR FUNGI: CPT

## 2022-06-09 PROCEDURE — 87086 URINE CULTURE/COLONY COUNT: CPT

## 2022-06-09 PROCEDURE — 81001 URINALYSIS AUTO W/SCOPE: CPT

## 2022-06-09 PROCEDURE — 87491 CHLMYD TRACH DNA AMP PROBE: CPT

## 2022-06-09 PROCEDURE — 36415 COLL VENOUS BLD VENIPUNCTURE: CPT

## 2022-06-09 PROCEDURE — 87205 SMEAR GRAM STAIN: CPT

## 2022-06-09 ASSESSMENT — ENCOUNTER SYMPTOMS
SINUS PRESSURE: 0
BLOOD IN STOOL: 0
PHOTOPHOBIA: 0
CHOKING: 0
WHEEZING: 0
ABDOMINAL PAIN: 0
CHEST TIGHTNESS: 0
EYE REDNESS: 0
EYE PAIN: 0
CONSTIPATION: 0
ABDOMINAL DISTENTION: 0
SHORTNESS OF BREATH: 0
TROUBLE SWALLOWING: 0
SORE THROAT: 0
VOMITING: 0
BACK PAIN: 0
EYE DISCHARGE: 0
RHINORRHEA: 0
COUGH: 0
VOICE CHANGE: 0
DIARRHEA: 0
NAUSEA: 0
EYE ITCHING: 0

## 2022-06-09 NOTE — ED NOTES
Pt resting in bed with call light in reach, states no further needs at this time. Pt breaths easy and unlabored, no distress noted at this time.        [facility]  06/09/22 0286

## 2022-06-09 NOTE — ED TRIAGE NOTES
Pt arrives to ED from home with c/o vaginal pain. Pt states she has had intercourse recently and feels she may have some tears. They have become very painful since yesterday, pt states she also has burning and trouble urinating. Denies any vaginal discharge at this time.

## 2022-06-09 NOTE — ED NOTES
Pt resting in bed with call light in reach, pelvic setup complete for examination. No distress noted, pt breaths easy and unlabored. Pt reminded to provide urine sample at earliest convenience.        [facility]  06/09/22 018

## 2022-06-09 NOTE — ED PROVIDER NOTES
[facility]  eMERGENCY dEPARTMENT eNCOUnter          CHIEF COMPLAINT       Chief Complaint   Patient presents with    Vaginal Pain       Nurses Notes reviewed and I agree except as noted in the HPI. HISTORY OF PRESENT ILLNESS    Debra Meadows is a 28 y.o. female who presents with vaginal pain. She was apparently at urgent care and checked for urinary tract infection and did not look like she had 1 but they put her on Macrobid she states she is still taking it. Patient states that she has had her first new partner of the year. She states that sometimes sex is a little uncomfortable. She states that sometimes it hurts quite a deal.  She feels as if it may be she may be getting trauma to her vagina. She denies any bleeding. She has had no vaginal discharge or vaginal bleeding. She states she also used a lubrication recently which was of the warming Friday and is unsure whether this is the irritation or not. Patient is also complaining about a lymph node in the right groin. She states it is mildly tender. She states she has not been sick recently although she does have a current diagnosis of interstitial cystitis. Patient is otherwise resting comfortably on the cot no apparent distress no other physical complaints at this time. REVIEW OF SYSTEMS     Review of Systems   Constitutional: Negative for activity change, appetite change, diaphoresis, fatigue and unexpected weight change. HENT: Negative for congestion, ear discharge, ear pain, hearing loss, rhinorrhea, sinus pressure, sore throat, trouble swallowing and voice change. Eyes: Negative for photophobia, pain, discharge, redness and itching. Respiratory: Negative for cough, choking, chest tightness, shortness of breath and wheezing. Cardiovascular: Negative for chest pain, palpitations and leg swelling.    Gastrointestinal: Negative for abdominal distention, abdominal pain, blood in stool, constipation, diarrhea, nausea and vomiting. Endocrine: Negative for polydipsia, polyphagia and polyuria. Genitourinary: Positive for vaginal pain. Negative for decreased urine volume, difficulty urinating, dysuria, enuresis, frequency, hematuria, menstrual problem, urgency, vaginal bleeding and vaginal discharge. Musculoskeletal: Negative for arthralgias, back pain, gait problem, myalgias, neck pain and neck stiffness. Skin: Negative for pallor and rash. Allergic/Immunologic: Negative for immunocompromised state. Neurological: Negative for dizziness, tremors, seizures, syncope, facial asymmetry, weakness, light-headedness, numbness and headaches. Hematological: Negative for adenopathy. Does not bruise/bleed easily. Psychiatric/Behavioral: Negative for agitation, hallucinations and suicidal ideas. The patient is not nervous/anxious. PAST MEDICAL HISTORY    has a past medical history of Anemia, Asthma, Bladder prolapse, female, acquired, Constipation, Headache(784.0), Interstitial cystitis, Mental disorder, and Sickle cell anemia ([facility] [address] SURGICAL HISTORY      has a past surgical history that includes Ankle surgery; Colonoscopy (2013); laparoscopy (7/1/13); [place] tooth extraction (2014); Cystocopy (9/28/15); Foot surgery (Bilateral, 06/22/2016); Dilation and curettage of uterus; Foot surgery (Left, 09/27/2017); pr gastrocnemius recession (Left, 9/27/2017); other surgical history (Left, 02/14/2018); pr removal deep implant (Left, 2/14/2018); Foot surgery (Right, 03/28/2018); and pr full excis 5th metatarsal head (Right, 3/28/2018).     CURRENT MEDICATIONS       Discharge Medication List as of 6/9/2022  3:41 AM      CONTINUE these medications which have NOT CHANGED    Details   nitrofurantoin, macrocrystal-monohydrate, (MACROBID) 100 MG capsule Take 1 capsule by mouth 2 times daily for 5 days, Disp-10 capsule, R-0Normal      fluticasone (FLONASE) 50 MCG/ACT nasal spray SHAKE LIQUID AND USE 1 SPRAY IN EACH NOSTRIL TWICE DAILY, Disp-16 g, R-0Msg From [facility]: Dr. Elvis James RequestedNormal      albuterol (PROVENTIL) (2.5 MG/3ML) 0.083% nebulizer solution Take 3 mLs by nebulization every 6 hours as needed for Wheezing, Disp-120 each, R-3Print      omalizumab Trent Libel) 150 MG/ML SOSY injection Inject 300 mg into the skin every 28 days Patient may have pen. MAIL TO PATIENTS HOME, Disp-2 each, R-5MAIL TO PATIENTS HOMENormal      Multiple Vitamins-Minerals (THERAPEUTIC MULTIVITAMIN-MINERALS) tablet Take 1 tablet by mouth dailyHistorical Med      albuterol sulfate HFA (PROVENTIL HFA) 108 (90 Base) MCG/ACT inhaler Inhale 2 puffs into the lungs every 6 hours as needed for Wheezing, Disp-1 each, R-3Normal      budesonide-formoterol (SYMBICORT) 160-4.5 MCG/ACT AERO Inhale 2 puffs into the lungs 2 times daily, Disp-1 each, R-5Normal      EPINEPHrine (EPIPEN 2-NATHAN) 0.3 MG/0.3ML SOAJ injection Inject 0.3 mLs into the muscle once for 1 dose Use as directed for allergic reaction, Disp-0.3 mL, R-0Normal      levocetirizine (XYZAL ALLERGY 24HR) 5 MG tablet Take 1 tablet by mouth nightly, Disp-30 tablet, R-11Normal      montelukast (SINGULAIR) 10 MG tablet Take 1 tablet by mouth nightly, Disp-30 tablet, R-11Normal      pseudoephedrine (DECONGESTANT) 30 MG tablet Take 1 tablet by mouth every 6 hours as needed for Congestion, Disp-120 tablet, R-2Normal      acetaminophen (TYLENOL) 325 MG tablet Take 500 mg by mouth every 6 hours as needed for Pain Historical Med             ALLERGIES     has No Known Allergies. FAMILY HISTORY     She indicated that her mother is alive. She indicated that her father is alive. She indicated that the status of her maternal grandmother is unknown. She indicated that her maternal grandfather is . family history includes Asthma in her father and mother; Depression in her mother; Heart Disease in her maternal grandmother; High Blood Pressure in her maternal grandmother.     SOCIAL HISTORY      reports that she has never smoked. She has never used smokeless tobacco. She reports that she does not drink alcohol and does not use drugs. PHYSICAL EXAM     INITIAL VITALS:  oral temperature is 98.6 °F (37 °C). Her blood pressure is 132/80 and her pulse is 96. Her respiration is 16 and oxygen saturation is 100%. Physical Exam  Vitals and nursing note reviewed. Constitutional:       General: She is not in acute distress. Appearance: She is well-developed. She is not diaphoretic. HENT:      Head: Normocephalic and atraumatic. Right Ear: External ear normal.      Left Ear: External ear normal.      Nose: Nose normal.      Mouth/Throat:      Pharynx: No oropharyngeal exudate. Eyes:      General: No scleral icterus. Right eye: No discharge. Left eye: No discharge. Conjunctiva/sclera: Conjunctivae normal.      Pupils: Pupils are equal, round, and reactive to light. Neck:      Thyroid: No thyromegaly. Vascular: No JVD. Trachea: No tracheal deviation. Cardiovascular:      Rate and Rhythm: Normal rate and regular rhythm. Heart sounds: Normal heart sounds, S1 normal and S2 normal. No murmur heard. No friction rub. No gallop. Pulmonary:      Effort: Pulmonary effort is normal.      Breath sounds: Normal breath sounds. No stridor. No wheezing, rhonchi or rales. Chest:      Chest wall: No tenderness. Abdominal:      General: Bowel sounds are normal. There is no distension. Palpations: Abdomen is soft. There is no mass. Tenderness: There is no abdominal tenderness. There is no guarding or rebound. Hernia: No hernia is present. Genitourinary:     Labia:         Right: Lesion present. No rash, tenderness or injury. Left: No rash, tenderness, lesion or injury. Vagina: Normal. No signs of injury and foreign body. No vaginal discharge, erythema, tenderness, bleeding, lesions or prolapsed vaginal walls.       Cervix: No cervical motion tenderness, discharge, friability, lesion, erythema, cervical bleeding or eversion. Uterus: Normal.       Adnexa: Right adnexa normal and left adnexa normal.          Comments: Small lesion noted labia on the right. A few small condyloma or possibly a older herpetic lesion. Not definitively vesicular in nature. No multiple lesions noted  Musculoskeletal:         General: No tenderness. Normal range of motion. Cervical back: Normal range of motion and neck supple. Lymphadenopathy:      Cervical: No cervical adenopathy. Skin:     General: Skin is warm and dry. Findings: No bruising, ecchymosis, lesion or rash. Neurological:      Mental Status: She is alert and oriented to person, place, and time. Cranial Nerves: No cranial nerve deficit. Coordination: Coordination normal.      Deep Tendon Reflexes: Reflexes are normal and symmetric. Psychiatric:         Speech: Speech normal.         Behavior: Behavior normal.         Thought Content: Thought content normal.         Judgment: Judgment normal.           DIFFERENTIAL DIAGNOSIS:   Vaginal irritation from sex, vaginal irritation from a chemical lubricant, STD, interstitial cystitis. DIAGNOSTIC RESULTS     EKG: All EKG's are interpreted by the Emergency Department Physician who either signs or Co-signs this chart in the absence of a cardiologist.  None    RADIOLOGY: non-plain film images(s) such as CT, Ultrasound and MRI are read by the radiologist.  None.     LABS:   Labs Reviewed   CBC WITH AUTO DIFFERENTIAL - Abnormal; Notable for the following components:       Result Value    MCHC 30.7 (*)     All other components within normal limits   OSMOLALITY - Abnormal; Notable for the following components:    Osmolality Calc 274.2 (*)     All other components within normal limits   URINE WITH REFLEXED MICRO - Abnormal; Notable for the following components:    Leukocyte Esterase, Urine TRACE (*)     Character, Urine CLOUDY (*)     All other components within normal limits   KOH (SKIN,HAIR,NAILS)    Narrative:     Source: vaginal non-OB patient       Site:           Current Antibiotics: not stated   WET PREP, GENITAL    Narrative:     Source: vaginal non-OB patient       Site:           Current Antibiotics: not stated   CULTURE, GENITAL   C. TRACHOMATIS / N. GONORRHOEAE, DNA   CULTURE, HSV   CULTURE, REFLEXED, URINE   MAGNESIUM   HCG, SERUM, QUALITATIVE   COMPREHENSIVE METABOLIC PANEL   ANION GAP   GLOMERULAR FILTRATION RATE, ESTIMATED       EMERGENCY DEPARTMENT COURSE:   Vitals:    Vitals:    06/09/22 0005 06/09/22 0230   BP: 136/84 132/80   Pulse: 99 96   Resp: 18 16   Temp: 98.6 °F (37 °C)    TempSrc: Oral    SpO2: 100% 100%     The patient was assessed at bedside labs and imaging were ordered pelvic exam was done. Like exam was relatively benign except for 1 small lesion was noted on the right labia. Herpes simplex virus culture was also ordered. Here today we find that the STD labs will not be run this evening. I went back and talk to the patient and discussed case with her. I told her if she could look on my chart in the morning. If anything was positive she needs to come back to initiate treatment. The rest of the patient's labs were within normal limits. She is instructed to continue to take the antibiotics for urinary tract infection. Patient understood and agreed to the plan. Patient is subsequently discharged home in stable condition. Patient has what appears to be vaginal pain and suspicion of an STD. Labs will not be back tonight. Patient elects to go home. She is instructed to follow-up with [facility]. If anything is positive she is instructed to come back to the emergency room immediately for any new or worsening complaints. Patient is instructed to go to the [address] [place] [address] for testing for HSV and HIV.  Patient is also instructed to follow-up with her primary care physician and call for an appointment within the next 1 to 2 days.      CRITICAL CARE:   None    CONSULTS:  None    PROCEDURES:  None    FINAL IMPRESSION      1. Vaginal pain    2.  Possible exposure to STD          DISPOSITION/PLAN   Discharge    PATIENT REFERRED TO:  MD Kendrick Carter [address] [facility]  [phone]    Call in 1 day      Melchor Reeves. [address].  [address]  [address] [id number]    Call in 1 day  For testing for HSV and HIV      DISCHARGE MEDICATIONS:  Discharge Medication List as of 6/9/2022  3:41 AM          (Please note that portions of this note were completed with a voice recognition program.  Efforts were made to edit the dictations but occasionally words are mis-transcribed.)    DO Della Nunn, DO  06/09/22 Raffi OSORIO [address]. [address] [place] [address], DO  06/09/22 [facility]

## 2022-06-10 LAB
CULTURE: NORMAL
ORGANISM: ABNORMAL
SPECIMEN DESCRIPTION: NORMAL
URINE CULTURE REFLEX: ABNORMAL

## 2022-06-11 LAB
GENITAL CULTURE, ROUTINE: NORMAL
GRAM STAIN RESULT: NORMAL

## 2022-06-13 ENCOUNTER — TELEPHONE (OUTPATIENT)
Dept: PHARMACY | Age: 32
End: 2022-06-13

## 2022-06-13 RX ORDER — METRONIDAZOLE 500 MG/1
500 TABLET ORAL 2 TIMES DAILY
Qty: 14 TABLET | Refills: 0 | Status: SHIPPED | OUTPATIENT
Start: 2022-06-13 | End: 2022-06-20

## 2022-06-13 NOTE — TELEPHONE ENCOUNTER
Pharmacy Note  ED Culture Follow-up    Maribell Chino is a 28 y.o. female. Allergies: Patient has no known allergies. Labs:  Lab Results   Component Value Date    BUN 13 06/09/2022    CREATININE 0.7 06/09/2022    WBC 5.2 06/09/2022     Estimated Creatinine Clearance: 106 mL/min (based on SCr of 0.7 mg/dL). Current antimicrobials:   · Nitrofurantoin BID x 5 days    ASSESSMENT:  Micro results:   Genital culture: positive for streptococcus equisimilis and gardnerella vaginalis     PLAN:  Need for intervention: Yes  Discussed with: Kallie Keys PA-C  Chosen treatment:    · Start taking metronidazole 500 mg BID x 7 days    Patient response:   · Called and spoke with patient. She states she is still having symptoms. Informed patient she iher vaginal culture was positive for Gardnerella vaginalis. Informed patient I would call in prescription for metronidazole 500 mg BID x 7 days. Called/sent in prescription to: [facility] in [address] [place] [address]    Please call with any questions.  Abner Holt, [address] [facility], PharmD 6:38 PM 6/13/2022

## 2022-06-14 LAB
HERPES SIMPLEX VIRUS 1 IGG: 0.19
HERPES SIMPLEX VIRUS 2 IGG: 0.06
HERPES TYPE 1/2 IGM COMBINED: 0.96

## 2022-06-21 ENCOUNTER — APPOINTMENT (OUTPATIENT)
Dept: CT IMAGING | Age: 32
End: 2022-06-21
Payer: COMMERCIAL

## 2022-06-21 ENCOUNTER — HOSPITAL ENCOUNTER (EMERGENCY)
Age: 32
Discharge: HOME | End: 2022-06-21
Payer: COMMERCIAL

## 2022-06-21 VITALS
BODY MASS INDEX: 21.49 KG/M2 | HEIGHT: 65 IN | HEART RATE: 63 BPM | SYSTOLIC BLOOD PRESSURE: 121 MMHG | WEIGHT: 129 LBS | OXYGEN SATURATION: 100 % | RESPIRATION RATE: 15 BRPM | DIASTOLIC BLOOD PRESSURE: 79 MMHG | TEMPERATURE: 98.2 F

## 2022-06-21 DIAGNOSIS — R33.9 URINARY RETENTION: Primary | ICD-10-CM

## 2022-06-21 LAB
ALBUMIN SERPL-MCNC: 4.2 G/DL (ref 3.5–5.1)
ALP BLD-CCNC: 47 U/L (ref 38–126)
ALT SERPL-CCNC: 10 U/L (ref 11–66)
AMORPHOUS: ABNORMAL
ANION GAP SERPL CALCULATED.3IONS-SCNC: 11 MEQ/L (ref 8–16)
AST SERPL-CCNC: 14 U/L (ref 5–40)
BACTERIA: ABNORMAL /HPF
BASOPHILS # BLD: 0.6 %
BASOPHILS ABSOLUTE: 0 THOU/MM3 (ref 0–0.1)
BILIRUB SERPL-MCNC: 0.2 MG/DL (ref 0.3–1.2)
BILIRUBIN DIRECT: < 0.2 MG/DL (ref 0–0.3)
BILIRUBIN URINE: NEGATIVE
BLOOD, URINE: NEGATIVE
BUN BLDV-MCNC: 14 MG/DL (ref 7–22)
CALCIUM SERPL-MCNC: 9.2 MG/DL (ref 8.5–10.5)
CASTS UA: ABNORMAL /LPF
CHARACTER, URINE: CLEAR
CHLORIDE BLD-SCNC: 107 MEQ/L (ref 98–111)
CO2: 23 MEQ/L (ref 23–33)
COLOR: ABNORMAL
CREAT SERPL-MCNC: 0.7 MG/DL (ref 0.4–1.2)
CRYSTALS, UA: ABNORMAL
EOSINOPHIL # BLD: 3.3 %
EOSINOPHILS ABSOLUTE: 0.2 THOU/MM3 (ref 0–0.4)
EPITHELIAL CELLS, UA: ABNORMAL /HPF
ERYTHROCYTE [DISTWIDTH] IN BLOOD BY AUTOMATED COUNT: 12.8 % (ref 11.5–14.5)
ERYTHROCYTE [DISTWIDTH] IN BLOOD BY AUTOMATED COUNT: 40.5 FL (ref 35–45)
GFR SERPL CREATININE-BSD FRML MDRD: > 90 ML/MIN/1.73M2
GLUCOSE BLD-MCNC: 70 MG/DL (ref 70–108)
GLUCOSE URINE: NEGATIVE MG/DL
HCT VFR BLD CALC: 38 % (ref 37–47)
HEMOGLOBIN: 11.1 GM/DL (ref 12–16)
IMMATURE GRANS (ABS): 0.01 THOU/MM3 (ref 0–0.07)
IMMATURE GRANULOCYTES: 0.2 %
KETONES, URINE: ABNORMAL
LEUKOCYTE ESTERASE, URINE: NEGATIVE
LIPASE: 25.6 U/L (ref 5.6–51.3)
LYMPHOCYTES # BLD: 58 %
LYMPHOCYTES ABSOLUTE: 3.1 THOU/MM3 (ref 1–4.8)
MAGNESIUM: 1.8 MG/DL (ref 1.6–2.4)
MCH RBC QN AUTO: 25.7 PG (ref 26–33)
MCHC RBC AUTO-ENTMCNC: 29.2 GM/DL (ref 32.2–35.5)
MCV RBC AUTO: 88 FL (ref 81–99)
MONOCYTES # BLD: 8.1 %
MONOCYTES ABSOLUTE: 0.4 THOU/MM3 (ref 0.4–1.3)
MUCUS: ABNORMAL
NITRITE, URINE: NEGATIVE
NUCLEATED RED BLOOD CELLS: 0 /100 WBC
OSMOLALITY CALCULATION: 280.1 MOSMOL/KG (ref 275–300)
PH UA: 6 (ref 5–9)
PLATELET # BLD: 288 THOU/MM3 (ref 130–400)
PMV BLD AUTO: 10.5 FL (ref 9.4–12.4)
POTASSIUM SERPL-SCNC: 4.1 MEQ/L (ref 3.5–5.2)
PREGNANCY, SERUM: NEGATIVE
PROTEIN UA: ABNORMAL
RBC # BLD: 4.32 MILL/MM3 (ref 4.2–5.4)
RBC URINE: ABNORMAL /HPF
RENAL EPITHELIAL, UA: ABNORMAL
SEG NEUTROPHILS: 29.8 %
SEGMENTED NEUTROPHILS ABSOLUTE COUNT: 1.6 THOU/MM3 (ref 1.8–7.7)
SODIUM BLD-SCNC: 141 MEQ/L (ref 135–145)
SPECIFIC GRAVITY, URINE: >= 1.03 (ref 1–1.03)
TOTAL PROTEIN: 6.9 G/DL (ref 6.1–8)
UROBILINOGEN, URINE: 0.2 EU/DL (ref 0–1)
WBC # BLD: 5.4 THOU/MM3 (ref 4.8–10.8)
WBC UA: ABNORMAL /HPF
YEAST: ABNORMAL

## 2022-06-21 PROCEDURE — 83735 ASSAY OF MAGNESIUM: CPT

## 2022-06-21 PROCEDURE — 51702 INSERT TEMP BLADDER CATH: CPT

## 2022-06-21 PROCEDURE — 85025 COMPLETE CBC W/AUTO DIFF WBC: CPT

## 2022-06-21 PROCEDURE — 84703 CHORIONIC GONADOTROPIN ASSAY: CPT

## 2022-06-21 PROCEDURE — 80053 COMPREHEN METABOLIC PANEL: CPT

## 2022-06-21 PROCEDURE — 51798 US URINE CAPACITY MEASURE: CPT

## 2022-06-21 PROCEDURE — 76376 3D RENDER W/INTRP POSTPROCES: CPT

## 2022-06-21 PROCEDURE — 74176 CT ABD & PELVIS W/O CONTRAST: CPT

## 2022-06-21 PROCEDURE — 82248 BILIRUBIN DIRECT: CPT

## 2022-06-21 PROCEDURE — 99284 EMERGENCY DEPT VISIT MOD MDM: CPT

## 2022-06-21 PROCEDURE — 81001 URINALYSIS AUTO W/SCOPE: CPT

## 2022-06-21 PROCEDURE — 36415 COLL VENOUS BLD VENIPUNCTURE: CPT

## 2022-06-21 PROCEDURE — 83690 ASSAY OF LIPASE: CPT

## 2022-06-21 ASSESSMENT — ENCOUNTER SYMPTOMS
ABDOMINAL PAIN: 0
COLOR CHANGE: 0
EYE DISCHARGE: 0
EYE ITCHING: 0
RHINORRHEA: 0
SHORTNESS OF BREATH: 0
VOMITING: 0
COUGH: 0
SORE THROAT: 0
DIARRHEA: 0
WHEEZING: 0
EYE PAIN: 0
NAUSEA: 0
BACK PAIN: 0

## 2022-06-21 ASSESSMENT — PAIN - FUNCTIONAL ASSESSMENT: PAIN_FUNCTIONAL_ASSESSMENT: 0-10

## 2022-06-21 ASSESSMENT — PAIN SCALES - GENERAL: PAINLEVEL_OUTOF10: 10

## 2022-06-21 ASSESSMENT — PAIN DESCRIPTION - FREQUENCY: FREQUENCY: CONTINUOUS

## 2022-06-21 ASSESSMENT — PAIN DESCRIPTION - PAIN TYPE: TYPE: ACUTE PAIN

## 2022-06-21 ASSESSMENT — PAIN DESCRIPTION - LOCATION: LOCATION: ABDOMEN;GROIN

## 2022-06-21 NOTE — ED PROVIDER NOTES
[facility] 65 22 COMPLAINT       Chief Complaint   Patient presents with    Urinary Retention    Abdominal Pain       Nurses Notes reviewed and I agree except as notedin the HPI. HISTORY OF PRESENT ILLNESS    Angi Meyer is a 28 y.o. female who presents complains of lower abdominal pain and urinary tension. She states that she started Adderall 2 days ago and started having trouble with her urinary flow then. she states that she got to the point where she could not urinate this morning and came in to get checked. She has had some lower abdominal pain as well as some pain in her bilateral flank. She denies any injury. She does not have the urge to defecate now. She denies any vomiting or diarrhea. Location/Symptom: Trouble urinating  Timing/Onset: 2 days  Context/Setting: home  Quality: none  Duration: constant  Modifying Factors: None  Severity: none    REVIEW OF SYSTEMS     Review of Systems   Constitutional: Negative for activity change, appetite change, chills and fever. HENT: Negative for congestion, ear pain, rhinorrhea and sore throat. Eyes: Negative for pain, discharge and itching. Respiratory: Negative for cough, shortness of breath and wheezing. Cardiovascular: Negative for chest pain. Gastrointestinal: Negative for abdominal pain, diarrhea, nausea and vomiting. Genitourinary: Positive for difficulty urinating and urgency. Negative for dysuria. Musculoskeletal: Negative for arthralgias, back pain and myalgias. Skin: Negative for color change and rash. Neurological: Negative for dizziness, seizures, light-headedness and headaches. Psychiatric/Behavioral: Negative for agitation, confusion, self-injury and suicidal ideas. All other systems reviewed and are negative.        PAST MEDICAL HISTORY    has a past medical history of Anemia, Asthma, Bladder prolapse, female, acquired, Constipation, Headache(784.0), Interstitial cystitis, Mental disorder, and Sickle cell anemia ([facility] [address] SURGICAL HISTORY      has a past surgical history that includes Ankle surgery; Colonoscopy (2013); laparoscopy (7/1/13); [place] tooth extraction (2014); Cystocopy (9/28/15); Foot surgery (Bilateral, 06/22/2016); Dilation and curettage of uterus; Foot surgery (Left, 09/27/2017); pr gastrocnemius recession (Left, 9/27/2017); other surgical history (Left, 02/14/2018); pr removal deep implant (Left, 2/14/2018); Foot surgery (Right, 03/28/2018); and pr full excis 5th metatarsal head (Right, 3/28/2018). CURRENT MEDICATIONS       Discharge Medication List as of 6/21/2022  2:07 PM      CONTINUE these medications which have NOT CHANGED    Details   fluticasone (FLONASE) 50 MCG/ACT nasal spray SHAKE LIQUID AND USE 1 SPRAY IN EACH NOSTRIL TWICE DAILY, Disp-16 g, R-0Msg From [facility]: Dr. Ronaldo Gomes RequestedNormal      albuterol (PROVENTIL) (2.5 MG/3ML) 0.083% nebulizer solution Take 3 mLs by nebulization every 6 hours as needed for Wheezing, Disp-120 each, R-3Print      omalizumab Herminia Combs) 150 MG/ML SOSY injection Inject 300 mg into the skin every 28 days Patient may have pen.  MAIL TO PATIENTS HOME, Disp-2 each, R-5MAIL TO PATIENTS HOMENormal      Multiple Vitamins-Minerals (THERAPEUTIC MULTIVITAMIN-MINERALS) tablet Take 1 tablet by mouth dailyHistorical Med      albuterol sulfate HFA (PROVENTIL HFA) 108 (90 Base) MCG/ACT inhaler Inhale 2 puffs into the lungs every 6 hours as needed for Wheezing, Disp-1 each, R-3Normal      budesonide-formoterol (SYMBICORT) 160-4.5 MCG/ACT AERO Inhale 2 puffs into the lungs 2 times daily, Disp-1 each, R-5Normal      EPINEPHrine (EPIPEN 2-NATHAN) 0.3 MG/0.3ML SOAJ injection Inject 0.3 mLs into the muscle once for 1 dose Use as directed for allergic reaction, Disp-0.3 mL, R-0Normal      levocetirizine (XYZAL ALLERGY 24HR) 5 MG tablet Take 1 tablet by mouth nightly, Disp-30 tablet, R-11Normal      montelukast (SINGULAIR) 10 MG DIFFERENTIAL DIAGNOSIS:   Urinary tension. Rule out obstruction. Possible UTI possible kidney stone she can ambulate I do not feel this is a myasthenia gravis picture. DIAGNOSTIC RESULTS     EKG: All EKG's are interpreted by the Emergency Department Physician who either signs or Co-signs this chart in the absence of a cardiologist.      RADIOLOGY: non-plain film images(s) such as CT, Ultrasound and MRI are read by the radiologist.  CT ABDOMEN PELVIS WO CONTRAST Additional Contrast? None   Final Result      1. Gordon catheter within the bladder. The tip of the catheter appears be just past the bladder wall. Please correlate clinically. 2. No evidence of renal stones or hydronephrosis. 3. Extensive stool in the colon. 4. Small inguinal lymph nodes. 5. Small periumbilical hernia containing fat. **This report has been created using voice recognition software. It may contain minor errors which are inherent in voice recognition technology. **      Final report electronically signed by DR Luis Good on 6/21/2022 9:17 AM      CT LUMBAR RECONSTRUCTION WO POST PROCESS   Final Result       1. Transitional anatomy. 2. No spinal canal stenosis, no foraminal stenosis and there are no acute findings in the lumbar spine. **This report has been created using voice recognition software. It may contain minor errors which are inherent in voice recognition technology. **      Final report electronically signed by Dr. Todd Whitehead on 6/21/2022 9:04 AM            LABS:   Labs Reviewed   CBC WITH AUTO DIFFERENTIAL - Abnormal; Notable for the following components:       Result Value    Hemoglobin 11.1 (*)     MCH 25.7 (*)     MCHC 29.2 (*)     Segs Absolute 1.6 (*)     All other components within normal limits   HEPATIC FUNCTION PANEL - Abnormal; Notable for the following components:     Total Bilirubin 0.2 (*)     ALT 10 (*)     All other components within normal limits   URINE WITH REFLEXED MICRO - Abnormal; Notable for the following components:    Ketones, Urine TRACE (*)     Protein, UA TRACE (*)     Color, UA DARK YELLOW (*)     All other components within normal limits   BASIC METABOLIC PANEL   LIPASE   MAGNESIUM   HCG, SERUM, QUALITATIVE   ANION GAP   GLOMERULAR FILTRATION RATE, ESTIMATED   OSMOLALITY       EMERGENCY DEPARTMENT COURSE:   :    Vitals:    06/21/22 0557 06/21/22 1015 06/21/22 1328   BP: 120/82 110/80 121/79   Pulse: 75 70 63   Resp: 16 14 15   Temp:   98.2 °F (36.8 °C)   TempSrc: Oral     SpO2: 100% 99% 100%   Weight: 129 lb (58.5 kg)     Height: 5' 5\" (1.651 m)       Patient was seen history physical exam was performed. Initially we did put a Gordon catheter in and she had  125 mL drainage. She did feel better. We did attempt to take the Gordon out and she could not urinate after that. She was watched here for another couple hours and had to 282 and her bladder is unable to urinate we did insert the Gordon again. Clinically she has no evidence of any neuromuscular condition. She has no back midline tenderness. She is has no weakness. I do not feel this is a myasthenia gravis issue. She has nothing obstructing on her CT scan. We will have her hold her Adderall and follow-up with urology with a leg bag. Did staffed the case with Dr. [place] Bernal:  None    CONSULTS:  None    PROCEDURES:  None    FINAL IMPRESSION      1.  Urinary retention          DISPOSITION/PLAN   Discharge    [facility]  [address] [facility]  [phone]  In 2 days        DISCHARGE MEDICATIONS:  Discharge Medication List as of 6/21/2022  2:07 PM          (Please note that portions of this note were completed with a voice recognitionprogram.  Efforts were made to edit the dictations but occasionally words are mis-transcribed.)    CRISTIANA [place]  06/21/22 2599 no

## 2022-06-21 NOTE — LETTER
[facility]  [address] [place] [address]  Phone: [phone]               June 23, 2022    Patient: Juana Robertson   YOB: 1990   Date of Visit: 6/21/2022       To Whom It May Concern:    Blanca Miner was seen and treated in our emergency department on 6/21/2022. She may return to work on 6/25/2022 per Randell Theodore PA-c.       Sincerely,       Meeta Awad RN         Signature:__________________________________

## 2022-06-21 NOTE — ED NOTES
Patient presents to the ED with complaints of being unable to urinate and having abdominal pain that radiates into her groin. She states that she has not urinated since 0630 on 6-20-22.      Delilah Cruz LPN  05/49/30 2675

## 2022-06-21 NOTE — ED NOTES
Patient still unable to urinate at this time. Patient states she does not feel need to urinate. Patient is resting in bed with easy and unlabored respirations. Call light in reach. Side rails up x2. Patient denies further complaints or concerns. Will monitor.         Hannah Díaz RN  06/21/22 2000

## 2022-06-21 NOTE — ED NOTES
Patient given leg bag to take home with roberts catheter. Patient instructed on use via teach back method. Patient demonstrates understanding.       Amy Benjamin RN  06/21/22 2032

## 2022-06-21 NOTE — ED NOTES
Catheter placed at this time. 250ml of dina colored urine returned.       Hilaria Hightower RN  06/21/22 6883

## 2022-06-21 NOTE — LETTER
[facility] [address] [address] EMERGENCY DEPT  [address] [place] [address]  Phone: [phone]               June 21, 2022    Patient: Marlin Miner   YOB: 1990   Date of Visit: 6/21/2022       To Whom It May Concern:    Roxann Burnette was seen and treated in our emergency department on 6/21/2022. She may return to work on 6/24/22.       Sincerely,       Michael Garcia RN        Signature:__________________________________

## 2022-06-21 NOTE — Clinical Note
Nadira Witt was seen and treated in our emergency department on 6/21/2022. She may return to work on 06/26/2022. If you have any questions or concerns, please don't hesitate to call.       CRISTIANA Ann

## 2022-06-21 NOTE — ED NOTES
Bladder scan reveals 282 mL of urine in bladder. Patient states need to void however she has been unable despite multiple attempts. Patient is resting in bed with easy and unlabored respirations. Call light in reach. Side rails up x2. Patient denies further complaints or concerns. Will monitor.         Kaylen Kauffman RN  06/21/22 4542

## 2022-06-21 NOTE — ED NOTES
Patient noted to have 50-75 mL urine via bladder scan. Patient given water to drink. Raymond ZENDEJAS notified.       Randell Muro RN  06/21/22 7485

## 2022-08-16 DIAGNOSIS — J45.40 MODERATE PERSISTENT ASTHMA, UNSPECIFIED WHETHER COMPLICATED: ICD-10-CM

## 2022-08-17 RX ORDER — BUDESONIDE AND FORMOTEROL FUMARATE DIHYDRATE 160; 4.5 UG/1; UG/1
AEROSOL RESPIRATORY (INHALATION)
Qty: 10.2 G | Refills: 2 | Status: SHIPPED | OUTPATIENT
Start: 2022-08-17 | End: 2022-11-03

## 2022-08-18 DIAGNOSIS — J45.40 MODERATE PERSISTENT ASTHMA, UNSPECIFIED WHETHER COMPLICATED: ICD-10-CM

## 2022-08-18 RX ORDER — ALBUTEROL SULFATE 90 UG/1
2 AEROSOL, METERED RESPIRATORY (INHALATION) EVERY 6 HOURS PRN
Qty: 1 EACH | Refills: 2 | Status: SHIPPED | OUTPATIENT
Start: 2022-08-18 | End: 2022-11-03 | Stop reason: SDUPTHER

## 2022-08-18 NOTE — TELEPHONE ENCOUNTER
Called patient to schedule appointment and inform her of Symbicort refill. Appointment scheduled. Patient then requested refill for Albuterol sulfate HFA also.

## 2022-10-18 ENCOUNTER — HOSPITAL ENCOUNTER (EMERGENCY)
Age: 32
Discharge: HOME | End: 2022-10-18
Payer: COMMERCIAL

## 2022-10-18 ENCOUNTER — APPOINTMENT (OUTPATIENT)
Dept: INTERVENTIONAL RADIOLOGY/VASCULAR | Age: 32
End: 2022-10-18
Payer: COMMERCIAL

## 2022-10-18 VITALS
BODY MASS INDEX: 20.33 KG/M2 | HEIGHT: 65 IN | WEIGHT: 122 LBS | OXYGEN SATURATION: 100 % | SYSTOLIC BLOOD PRESSURE: 113 MMHG | HEART RATE: 94 BPM | RESPIRATION RATE: 18 BRPM | TEMPERATURE: 98 F | DIASTOLIC BLOOD PRESSURE: 76 MMHG

## 2022-10-18 DIAGNOSIS — M79.605 LEFT LEG PAIN: Primary | ICD-10-CM

## 2022-10-18 PROCEDURE — 99284 EMERGENCY DEPT VISIT MOD MDM: CPT

## 2022-10-18 PROCEDURE — 93971 EXTREMITY STUDY: CPT

## 2022-10-18 RX ORDER — IBUPROFEN 600 MG/1
600 TABLET ORAL 4 TIMES DAILY PRN
Qty: 120 TABLET | Refills: 0 | Status: SHIPPED | OUTPATIENT
Start: 2022-10-18

## 2022-10-18 ASSESSMENT — PAIN - FUNCTIONAL ASSESSMENT: PAIN_FUNCTIONAL_ASSESSMENT: 0-10

## 2022-10-18 ASSESSMENT — ENCOUNTER SYMPTOMS
STRIDOR: 0
CHEST TIGHTNESS: 0
SORE THROAT: 0
VOMITING: 0
ABDOMINAL PAIN: 0
NAUSEA: 0
BACK PAIN: 0
SINUS PAIN: 0
DIARRHEA: 0
WHEEZING: 0
CONSTIPATION: 0
SHORTNESS OF BREATH: 0
COUGH: 0
PHOTOPHOBIA: 0

## 2022-10-18 ASSESSMENT — PAIN DESCRIPTION - ORIENTATION: ORIENTATION: LEFT

## 2022-10-18 ASSESSMENT — PAIN SCALES - GENERAL: PAINLEVEL_OUTOF10: 5

## 2022-10-18 ASSESSMENT — PAIN DESCRIPTION - LOCATION: LOCATION: LEG

## 2022-10-18 NOTE — Clinical Note
Parker Odor was seen and treated in our emergency department on 10/18/2022. She may return to work on 10/19/2022. If you have any questions or concerns, please don't hesitate to call.       Coral Friedman, ANDERSON - CNP

## 2022-10-18 NOTE — ED PROVIDER NOTES
[facility] [address] [address] EMERGENCY DEPT  EMERGENCY MEDICINE     Pt Name: Kiel John  MRN: [id number]  [place] 1990  Date of evaluation: 10/18/2022  PCP:    Felecia France MD  Provider: ANDERSON Leach - CNP    CHIEF COMPLAINT       Chief Complaint   Patient presents with    Leg Pain     LEFT           HISTORY OF PRESENT ILLNESS    Kiel John is a 33 y.o. female patient that presents to ER with left leg swelling that started 2 days ago. Denies any injury to the left knee. States that she noticed some redness over her left knee two days ago, but no rash since then. She notes that she has a history of asthma, but has had increased SOB for the past 4 days. She states that she is still using her Symbicort inhaler and Singulair as prescribed. Denies any fever, cough, congestion, wheezing, recent immobilizations, trauma, or surgeries. Denies any hormone contraceptive use. Denies fever, vomiting, abdominal pain, diarrhea, or constipation. Denies vision changes, lightheadedness, numbness, or tingling. Triage notes and Nursing notes were reviewed by myself. Any discrepancies are addressed above.     PAST MEDICAL HISTORY     Past Medical History:   Diagnosis Date    Anemia     on iron supplements    Asthma     Albuterol and Advair    Bladder prolapse, female, acquired     Constipation     Headache(784.0)     Interstitial cystitis     Mental disorder     bipolar    Sickle cell anemia ([facility] [address]     Has trait       SURGICAL HISTORY       Past Surgical History:   Procedure Laterality Date    ANKLE SURGERY      bilateral ankle revision    COLONOSCOPY  2013    CYSTOSCOPY  9/28/15    WITH HYDRODISTENTION    DILATION AND CURETTAGE OF UTERUS      for Missed AB    FOOT SURGERY Bilateral 06/22/2016    FOOT SURGERY Left 09/27/2017    osteotomy , gastrocnemius resectopm    FOOT SURGERY Right 03/28/2018    LAPAROSCOPY  7/1/13    OTHER SURGICAL HISTORY Left 02/14/2018    Removal of Screw from Calcaneus Left heel     MD FULL EXCIS 5TH METATARSAL HEAD Right 3/28/2018    MEDIAL CALCANEAL DISPLACEMENT OSTEOTOMY RIGHT FOOT, GASTROCNEMIUS LENGTHENING RIGHT LEG, COTTON OSTEOTOMY FIRST CUNEIFORM RIGHT FOOT WITH FORWEB RIGHT FOOT performed by Tre Benton DPM at [facility] 142 Left 9/27/2017    MEDIAL CALCANEALSLIDE WITH SCREW FIXATION LEFT FOOT, COTTON OSTEOTOMY LEFT FOOT, GASTROC LENGTHENING LEFT LEG performed by Tre Benton DPM at [facility] IMPLANT Left 2/14/2018    REMOVAL OF SCREW DROM CALCANEUS LEFT HEEL performed by Tre Benton DPM at [facility] EXTRACTION  2014       CURRENT MEDICATIONS       Discharge Medication List as of 10/18/2022 12:02 PM        CONTINUE these medications which have NOT CHANGED    Details   albuterol sulfate HFA (PROVENTIL HFA) 108 (90 Base) MCG/ACT inhaler Inhale 2 puffs into the lungs every 6 hours as needed for Wheezing, Disp-1 each, R-2Normal      SYMBICORT 160-4.5 MCG/ACT AERO INHALE TWO (2) PUFFS BY MOUTH TWICE DAILY, Disp-10.2 g, R-2, DAWNormal      fluticasone (FLONASE) 50 MCG/ACT nasal spray SHAKE LIQUID AND USE 1 SPRAY IN EACH NOSTRIL TWICE DAILY, Disp-16 g, R-0Msg From [facility]: Dr. Karolyn Duffy RequestedNormal      albuterol (PROVENTIL) (2.5 MG/3ML) 0.083% nebulizer solution Take 3 mLs by nebulization every 6 hours as needed for Wheezing, Disp-120 each, R-3Print      omalizumab Ludwig Mcburney) 150 MG/ML SOSY injection Inject 300 mg into the skin every 28 days Patient may have pen.  MAIL TO PATIENTS HOME, Disp-2 each, R-5MAIL TO PATIENTS HOMENormal      Multiple Vitamins-Minerals (THERAPEUTIC MULTIVITAMIN-MINERALS) tablet Take 1 tablet by mouth dailyHistorical Med      EPINEPHrine (EPIPEN 2-NATHAN) 0.3 MG/0.3ML SOAJ injection Inject 0.3 mLs into the muscle once for 1 dose Use as directed for allergic reaction, Disp-0.3 mL, R-0Normal      levocetirizine (XYZAL ALLERGY 24HR) 5 MG tablet Take 1 tablet by mouth nightly, Disp-30 tablet, R-11Normal      montelukast (SINGULAIR) 10 MG tablet Take 1 tablet by mouth nightly, Disp-30 tablet, R-11Normal      acetaminophen (TYLENOL) 325 MG tablet Take 500 mg by mouth every 6 hours as needed for Pain Historical Med             ALLERGIES       No Known Allergies    FAMILY HISTORY       Family History   Problem Relation Age of Onset    Asthma Mother     Depression Mother     Asthma Father     Heart Disease Maternal Grandmother     High Blood Pressure Maternal Grandmother         SOCIAL HISTORY       Social History     Socioeconomic History    Marital status: Legally    Tobacco Use    Smoking status: Never    Smokeless tobacco: Never   Vaping Use    Vaping Use: Former   Substance and Sexual Activity    Alcohol use: No    Drug use: No    Sexual activity: Yes     Partners: Male       REVIEW OF SYSTEMS     Review of Systems   Constitutional:  Negative for chills, fatigue and fever. HENT:  Negative for congestion, sinus pain and sore throat. Eyes:  Negative for photophobia and visual disturbance. Respiratory:  Negative for cough, chest tightness, shortness of breath, wheezing and stridor. Cardiovascular:  Positive for leg swelling (left leg swelling). Negative for chest pain. Gastrointestinal:  Negative for abdominal pain, constipation, diarrhea, nausea and vomiting. Genitourinary:  Negative for dysuria, frequency, hematuria and urgency. Musculoskeletal:  Positive for joint swelling (left leg swelling). Negative for back pain, gait problem and myalgias. Skin:  Positive for rash (admits rash on left knee a few days ago that has since resolved). Negative for pallor and wound. Neurological:  Negative for dizziness, seizures, syncope, weakness, light-headedness, numbness and headaches. Except as noted above the remainder of the review of systems was reviewed and is negative.   SCREENINGS        Rey Coma Scale  Eye Opening: Spontaneous  Best Verbal Response: Oriented  Best Motor Response: Obeys commands  [place] Coma Scale Score: 15               PHYSICAL EXAM    (up to 7 for level 4, 8 or more for level 5)     ED Triage Vitals [02/19/22 1512]   BP Temp Temp Source Pulse Resp SpO2 Height Weight   (!) 134/95 98.2 °F (36.8 °C) Oral 124 16 100 % -- --       Physical Exam  Constitutional:       General: She is not in acute distress. Appearance: Normal appearance. She is normal weight. She is not ill-appearing, toxic-appearing or diaphoretic. HENT:      Head: Normocephalic and atraumatic. Cardiovascular:      Rate and Rhythm: Normal rate and regular rhythm. Pulses: Normal pulses. Heart sounds: Normal heart sounds. No murmur heard. No friction rub. No gallop. Pulmonary:      Effort: Pulmonary effort is normal. No respiratory distress. Breath sounds: Normal breath sounds. No stridor. No wheezing, rhonchi or rales. Abdominal:      General: Abdomen is flat. Bowel sounds are normal. There is no distension. Palpations: Abdomen is soft. There is no mass. Tenderness: There is no abdominal tenderness. There is no guarding or rebound. Hernia: No hernia is present. Musculoskeletal:         General: No tenderness or signs of injury. Normal range of motion. Cervical back: Normal range of motion. Right lower leg: No edema. Left lower leg: Edema present. Comments: No overlying erythema, induration, bruising, or lesions; some non-pitting edema noted on left lower leg; full painless ROM of both knees; (-) Richard's sign; posterior tibial pulses 3+ bilaterally   Skin:     General: Skin is warm. Capillary Refill: Capillary refill takes less than 2 seconds. Coloration: Skin is not jaundiced. Findings: No bruising, erythema, lesion or rash. Neurological:      General: No focal deficit present. Mental Status: She is alert and oriented to person, place, and time. Sensory: No sensory deficit.       Motor: No weakness. Psychiatric:         Mood and Affect: Mood normal.         Behavior: Behavior normal.         DIAGNOSTIC RESULTS     EKG:(none if blank)  All EKGs are interpreted by the Emergency Department Physician who either signs or Co-signs this chart in the absence of a cardiologist.        RADIOLOGY: (none if blank)   I directly visualized the following images and reviewed the radiologist interpretations. Interpretation per the Radiologist below, if available at the time of this note:  VL DUP LOWER EXTREMITY VENOUS LEFT   Final Result   No evidence of a DVT. **This report has been created using voice recognition software. It may contain minor errors which are inherent in voice recognition technology. **      Final report electronically signed by Dr. John Smith on 10/18/2022 11:40 AM          LABS:  Labs Reviewed - No data to display    All other labs were within normal range or not returned as of this dictation. Please note, any cultures that may have been sent were not resulted at the time of this patient visit. EMERGENCY DEPARTMENT COURSE and Medical Decision Making:     Vitals:    Vitals:    10/18/22 1015   BP: 113/76   Pulse: 94   Resp: 18   Temp: 98 °F (36.7 °C)   TempSrc: Oral   SpO2: 100%   Weight: 122 lb (55.3 kg)   Height: 5' 5\" (1.651 m)       PROCEDURES: (None if blank)  Procedures       MDM  Number of Diagnoses or Management Options  Left leg pain: new, needed workup     Amount and/or Complexity of Data Reviewed  Tests in the radiology section of CPT®: ordered and reviewed          Patient presents to ER with left leg swelling that started 2 days ago. Patient has increased shortness of breath and has been using her home medications more frequently, but is present in the ER concerned about her left leg swelling. Patient is not tachypneic or hypoxic. Heart rate is less than 100. Will have patient follow-up with primary care provider or pulmonologist about her medications.   Patient actually complains of pain in the anterior portion of her left knee, but does states she has some tenderness in the calf muscle itself. Venous ultrasound was negative for blood clot. Wells criteria was used to screen for PE. Due to patient's lack of PE is unlikely in this patient. Patient be discharged home with ibuprofen and encouraged to ice and rest.  Patient to follow-up with orthopedic [place] if not better in the next few days. Patient instructed to return to ER for worsening symptoms, numbness or tingling in the extremities. Follow-up with your primary care provider or if not better can go to the orthopedic institute. May apply ice as needed for up to 15 minutes at a time for comfort. Take Tylenol or ibuprofen as needed for pain. ED Medications administered this visit:  Medications - No data to display      FINAL IMPRESSION      1.  Left leg pain          DISPOSITION/PLAN   DISPOSITION Decision To Discharge 10/18/2022 12:01:50 PM      PATIENT REFERRED TO:  MD Carol Ann Garcia [address] [place] [address]  [phone]          DISCHARGE MEDICATIONS:  Discharge Medication List as of 10/18/2022 12:02 PM        START taking these medications    Details   ibuprofen (ADVIL;MOTRIN) 600 MG tablet Take 1 tablet by mouth 4 times daily as needed for Pain, Disp-120 tablet, R-0Normal                    Carmine Sep, APRN - CNP (electronically signed)          Carmine Sep, APRN - CNP  10/18/22 [address]  Tushar Ponce APRN - CNP  10/18/22 8708

## 2022-10-18 NOTE — DISCHARGE INSTRUCTIONS
Return to ER for worsening symptoms, numbness or tingling in the extremities. Follow-up with your primary care provider or if not better can go to the orthopedic institute. May apply ice as needed for up to 15 minutes at a time for comfort. Take Tylenol or ibuprofen as needed for pain.

## 2022-11-03 ENCOUNTER — OFFICE VISIT (OUTPATIENT)
Dept: ALLERGY | Age: 32
End: 2022-11-03
Payer: COMMERCIAL

## 2022-11-03 ENCOUNTER — NURSE ONLY (OUTPATIENT)
Dept: ALLERGY | Age: 32
End: 2022-11-03
Payer: COMMERCIAL

## 2022-11-03 VITALS
RESPIRATION RATE: 16 BRPM | HEIGHT: 65 IN | BODY MASS INDEX: 20.73 KG/M2 | DIASTOLIC BLOOD PRESSURE: 62 MMHG | SYSTOLIC BLOOD PRESSURE: 106 MMHG | WEIGHT: 124.4 LBS | HEART RATE: 81 BPM | TEMPERATURE: 98.4 F | OXYGEN SATURATION: 98 %

## 2022-11-03 VITALS
OXYGEN SATURATION: 98 % | HEART RATE: 81 BPM | RESPIRATION RATE: 16 BRPM | DIASTOLIC BLOOD PRESSURE: 62 MMHG | SYSTOLIC BLOOD PRESSURE: 106 MMHG | TEMPERATURE: 98.4 F

## 2022-11-03 DIAGNOSIS — L50.1 CHRONIC IDIOPATHIC URTICARIA: ICD-10-CM

## 2022-11-03 DIAGNOSIS — J30.81 CAT ALLERGIES: ICD-10-CM

## 2022-11-03 DIAGNOSIS — Z91.14 NONCOMPLIANCE WITH MEDICATION REGIMEN: ICD-10-CM

## 2022-11-03 DIAGNOSIS — L50.1 CHRONIC IDIOPATHIC URTICARIA: Primary | ICD-10-CM

## 2022-11-03 DIAGNOSIS — Z91.018 FOOD ALLERGY: ICD-10-CM

## 2022-11-03 DIAGNOSIS — J30.1 ALLERGY TO TREES: ICD-10-CM

## 2022-11-03 DIAGNOSIS — J30.1 NON-SEASONAL ALLERGIC RHINITIS DUE TO POLLEN: ICD-10-CM

## 2022-11-03 DIAGNOSIS — Z91.038 ALLERGY TO COCKROACHES: ICD-10-CM

## 2022-11-03 DIAGNOSIS — Z91.048 ALLERGY TO MOLD: ICD-10-CM

## 2022-11-03 DIAGNOSIS — J45.40 MODERATE PERSISTENT ASTHMA, UNSPECIFIED WHETHER COMPLICATED: ICD-10-CM

## 2022-11-03 DIAGNOSIS — R76.8 ELEVATED IGE LEVEL: Primary | ICD-10-CM

## 2022-11-03 PROCEDURE — A4617 MOUTH PIECE: HCPCS | Performed by: NURSE PRACTITIONER

## 2022-11-03 PROCEDURE — 96372 THER/PROPH/DIAG INJ SC/IM: CPT | Performed by: NURSE PRACTITIONER

## 2022-11-03 PROCEDURE — G8427 DOCREV CUR MEDS BY ELIG CLIN: HCPCS | Performed by: NURSE PRACTITIONER

## 2022-11-03 PROCEDURE — G8484 FLU IMMUNIZE NO ADMIN: HCPCS | Performed by: NURSE PRACTITIONER

## 2022-11-03 PROCEDURE — 1036F TOBACCO NON-USER: CPT | Performed by: NURSE PRACTITIONER

## 2022-11-03 PROCEDURE — G8420 CALC BMI NORM PARAMETERS: HCPCS | Performed by: NURSE PRACTITIONER

## 2022-11-03 PROCEDURE — 94664 DEMO&/EVAL PT USE INHALER: CPT | Performed by: NURSE PRACTITIONER

## 2022-11-03 PROCEDURE — 99215 OFFICE O/P EST HI 40 MIN: CPT | Performed by: NURSE PRACTITIONER

## 2022-11-03 RX ORDER — METHYLPHENIDATE HYDROCHLORIDE 20 MG/1
CAPSULE, EXTENDED RELEASE ORAL
COMMUNITY
Start: 2022-10-07

## 2022-11-03 RX ORDER — TIOTROPIUM BROMIDE INHALATION SPRAY 1.56 UG/1
2 SPRAY, METERED RESPIRATORY (INHALATION) DAILY
Qty: 1 EACH | Refills: 3 | Status: SHIPPED | OUTPATIENT
Start: 2022-11-03

## 2022-11-03 RX ORDER — ALBUTEROL SULFATE 90 UG/1
2 AEROSOL, METERED RESPIRATORY (INHALATION) EVERY 6 HOURS PRN
Qty: 1 EACH | Refills: 2 | Status: SHIPPED | OUTPATIENT
Start: 2022-11-03

## 2022-11-03 ASSESSMENT — ENCOUNTER SYMPTOMS
ABDOMINAL PAIN: 0
COUGH: 1
NAUSEA: 0
CHEST TIGHTNESS: 0
DIARRHEA: 0
CHOKING: 0
RHINORRHEA: 1
STRIDOR: 0
APNEA: 0
SHORTNESS OF BREATH: 1
SINUS PRESSURE: 0
SORE THROAT: 0
VOMITING: 0
VOICE CHANGE: 0
FACIAL SWELLING: 0
WHEEZING: 1
TROUBLE SWALLOWING: 0
ROS SKIN COMMENTS: URTICARIA
COLOR CHANGE: 0

## 2022-11-03 NOTE — PROGRESS NOTES
@[facility]    Allergy & Asthma   [address] [facility], [address]  Ph:   [phone]  Fax:[phone]    Provider:  Dr. Acosta Doctor:   Chief Complaint   Patient presents with    Follow-up     Xolair and urticaria           HISTORY OF PRESENT ILLNESS: ESTABLISHED PATIENT HERE FOR EVALUATION   HPI      Review of Systems:  Review of Systems   Constitutional:  Negative for activity change, appetite change, chills, diaphoresis, fatigue, fever and unexpected weight change. HENT:  Positive for congestion and rhinorrhea. Negative for dental problem, ear discharge, ear pain, facial swelling, hearing loss, mouth sores, nosebleeds, postnasal drip, sinus pressure, sneezing, sore throat, tinnitus, trouble swallowing and voice change. Eyes:  Negative for visual disturbance. Respiratory:  Positive for cough, shortness of breath and wheezing. Negative for apnea, choking, chest tightness and stridor. Cardiovascular:  Negative for chest pain, palpitations and leg swelling. Gastrointestinal:  Negative for abdominal pain, diarrhea, nausea and vomiting. Endocrine: Negative for cold intolerance, heat intolerance, polydipsia and polyuria. Genitourinary:  Negative for difficulty urinating, dysuria, enuresis, hematuria and urgency. Musculoskeletal:  Negative for arthralgias, gait problem, neck pain and neck stiffness. Skin:  Positive for rash. Negative for color change. urticaria   Allergic/Immunologic: Positive for environmental allergies and food allergies. Negative for immunocompromised state. Neurological:  Negative for dizziness, syncope, facial asymmetry, speech difficulty, light-headedness and headaches. Hematological:  Negative for adenopathy. Does not bruise/bleed easily. Psychiatric/Behavioral:  Negative for confusion and sleep disturbance. The patient is not nervous/anxious. All other systems reviewed and are negative.       Past MedicalHistory:    Past Medical History:   Diagnosis Date    ADHD     Anemia     on iron supplements    Asthma     Albuterol and Advair    Bladder prolapse, female, acquired     Constipation     Headache(784.0)     Interstitial cystitis     Mental disorder     bipolar    Sickle cell anemia ([facility] [address]     Has trait       Past Surgical History:  Past Surgical History:   Procedure Laterality Date    ANKLE SURGERY      bilateral ankle revision    COLONOSCOPY  2013    CYSTOSCOPY  9/28/15    WITH HYDRODISTENTION    DILATION AND CURETTAGE OF UTERUS      for Missed AB    FOOT SURGERY Bilateral 06/22/2016    FOOT SURGERY Left 09/27/2017    osteotomy , gastrocnemius resectopm    FOOT SURGERY Right 03/28/2018    LAPAROSCOPY  7/1/13    OTHER SURGICAL HISTORY Left 02/14/2018    Removal of Screw from Calcaneus Left heel     ME FULL EXCIS 5TH METATARSAL HEAD Right 3/28/2018    MEDIAL CALCANEAL DISPLACEMENT OSTEOTOMY RIGHT FOOT, GASTROCNEMIUS LENGTHENING RIGHT LEG, COTTON OSTEOTOMY FIRST CUNEIFORM RIGHT FOOT WITH FORWEB RIGHT FOOT performed by Flor Perkins DPM at [place] [address] Left 9/27/2017    MEDIAL CALCANEALSLIDE WITH SCREW FIXATION LEFT FOOT, COTTON OSTEOTOMY LEFT FOOT, GASTROC LENGTHENING LEFT LEG performed by Flor Perikns DPM at [facility] IMPLANT Left 2/14/2018    REMOVAL OF SCREW DROM CALCANEUS LEFT HEEL performed by Flor Perkins DPM at [address] [facility] EXTRACTION  2014       Family History:   Family History   Problem Relation Age of Onset    Asthma Mother     Depression Mother     Asthma Father     Heart Disease Maternal Grandmother     High Blood Pressure Maternal Grandmother        Social History:   Social History     Tobacco Use    Smoking status: Never    Smokeless tobacco: Never   Substance Use Topics    Alcohol use: No        Allergies:  Patient has no known allergies.     CurrentMedications:     Current Outpatient Medications:     methylphenidate (RITALIN LA) 20 MG extended release capsule, TAKE 1 CAPSULE BY MOUTH EVERY DAY IN THE MORNING, Disp: , Rfl:     albuterol sulfate HFA (PROVENTIL HFA) 108 (90 Base) MCG/ACT inhaler, Inhale 2 puffs into the lungs every 6 hours as needed for Wheezing, Disp: 1 each, Rfl: 2    fluticasone-salmeterol (ADVAIR HFA) 115-21 MCG/ACT inhaler, 2 puffs inhaled twice daily. Rinse mouth well after use., Disp: 1 each, Rfl: 3    tiotropium (SPIRIVA RESPIMAT) 1.25 MCG/ACT AERS inhaler, Inhale 2 puffs into the lungs daily, Disp: 1 each, Rfl: 3    ibuprofen (ADVIL;MOTRIN) 600 MG tablet, Take 1 tablet by mouth 4 times daily as needed for Pain, Disp: 120 tablet, Rfl: 0    fluticasone (FLONASE) 50 MCG/ACT nasal spray, SHAKE LIQUID AND USE 1 SPRAY IN EACH NOSTRIL TWICE DAILY, Disp: 16 g, Rfl: 0    albuterol (PROVENTIL) (2.5 MG/3ML) 0.083% nebulizer solution, Take 3 mLs by nebulization every 6 hours as needed for Wheezing, Disp: 120 each, Rfl: 3    omalizumab (XOLAIR) 150 MG/ML SOSY injection, Inject 300 mg into the skin every 28 days Patient may have pen.  MAIL TO PATIENTS HOME, Disp: 2 each, Rfl: 5    Multiple Vitamins-Minerals (THERAPEUTIC MULTIVITAMIN-MINERALS) tablet, Take 1 tablet by mouth daily, Disp: , Rfl:     levocetirizine (XYZAL ALLERGY 24HR) 5 MG tablet, Take 1 tablet by mouth nightly, Disp: 30 tablet, Rfl: 11    montelukast (SINGULAIR) 10 MG tablet, Take 1 tablet by mouth nightly, Disp: 30 tablet, Rfl: 11    acetaminophen (TYLENOL) 325 MG tablet, Take 500 mg by mouth every 6 hours as needed for Pain , Disp: , Rfl:     EPINEPHrine (EPIPEN 2-NATHAN) 0.3 MG/0.3ML SOAJ injection, Inject 0.3 mLs into the muscle once for 1 dose Use as directed for allergic reaction, Disp: 0.3 mL, Rfl: 0      Physical Exam:      Vitals:    Vitals:    11/03/22 1058   BP: 106/62   Pulse: 81   Resp: 16   Temp: 98.4 °F (36.9 °C)   SpO2: 98%       124 lb 6.4 oz (56.4 kg)       Temp: 98.4 °F (36.9 °C) I [facility](6[facility]@ [facility] 81 I @FLOWSTAT(8)@ I BP: 106/62 I @SBPMAX(24)@; @HNKAIR(61)@ I Resp: 16 I @FLOWSTAT(9)@ I SpO2: 98 % I @FLOWSTAT(10)@ I   I Height: 5' 5\" (165.1 cm) I   I Facility age limit for growth percentiles is 20 years. I     Facility age limit for growth percentiles is 20 years. Facility age limit for growth percentiles is 20 years. Facility age limit for growth percentiles is 20 years. Facility age limit for growth percentiles is 20 years. Physical Exam:    Physical Exam  Vitals and nursing note reviewed. Constitutional:       Appearance: Normal appearance. She is normal weight. HENT:      Head: Normocephalic and atraumatic. Right Ear: Ear canal and external ear normal.      Left Ear: Ear canal and external ear normal.      Nose: Nose normal.      Mouth/Throat:      Mouth: Mucous membranes are moist.      Pharynx: Oropharynx is clear. Eyes:      Extraocular Movements: Extraocular movements intact. Conjunctiva/sclera: Conjunctivae normal.      Pupils: Pupils are equal, round, and reactive to light. Cardiovascular:      Rate and Rhythm: Normal rate and regular rhythm. Pulses: Normal pulses. Heart sounds: Normal heart sounds. Pulmonary:      Effort: Pulmonary effort is normal.      Breath sounds: Normal breath sounds. Musculoskeletal:         General: Normal range of motion. Cervical back: Normal range of motion and neck supple. Skin:     General: Skin is warm and dry. Capillary Refill: Capillary refill takes less than 2 seconds. Neurological:      General: No focal deficit present. Mental Status: She is alert and oriented to person, place, and time. Mental status is at baseline. Psychiatric:         Mood and Affect: Mood normal.         Behavior: Behavior normal.         Thought Content:  Thought content normal.         Judgment: Judgment normal.           DATA:  Lab Review:  CBC:   Lab Results   Component Value Date/Time    WBC 5.4 06/21/2022 07:44 AM    RBC 4.32 06/21/2022 07:44 AM    RBC 4.49 05/15/2019 01:20 PM    HGB 11.1 06/21/2022 07:44 AM    HCT 38.0 06/21/2022 07:44 AM    MCV 88.0 06/21/2022 07:44 AM    MCH 25.7 06/21/2022 07:44 AM    MCHC 29.2 06/21/2022 07:44 AM    RDW 13.1 05/15/2019 01:20 PM     06/21/2022 07:44 AM     BMP:    Lab Results   Component Value Date/Time    GLUCOSE 70 06/21/2022 07:44 AM     06/21/2022 07:44 AM    K 4.1 06/21/2022 07:44 AM    K 5.0 04/18/2020 03:15 PM     06/21/2022 07:44 AM    CO2 23 06/21/2022 07:44 AM    ANIONGAP 11.0 06/21/2022 07:44 AM    BUN 14 06/21/2022 07:44 AM    CREATININE 0.7 06/21/2022 07:44 AM    CALCIUM 9.2 06/21/2022 07:44 AM    LABGLOM >90 06/21/2022 07:44 AM          Immunoglobulin E   Date/Time Value Ref Range Status   08/27/2020 01:10  (H) <=214 kU/L Final     Comment:     REFERENCE INTERVAL: Immunoglobulin E, Serum  Access complete set of age- and/or gender-specific reference  intervals for this test in the [facility] Test Directory  ([email or web address]).        IgE   Date/Time Value Ref Range Status   08/27/2020 01:10  (H) <101 IU/mL Final     Comment:     [address] [phone]      IgG   Date/Time Value Ref Range Status   08/27/2020 01:10 PM 1335 700 - 1600 mg/dL Final     Comment:     [address] [phone]     IgA   Date/Time Value Ref Range Status   08/27/2020 01:10  70 - 400 mg/dL Final     Comment:     [address] [phone]      IgM   Date/Time Value Ref Range Status   08/27/2020 01:10  40 - 230 mg/dL Final     Comment:     [facility] [id number] [place] [address], [address] [place] [address] [phone]       No results found for: VAL   No results found for: RF       Results for orders placed during the hospital encounter of 08/27/20    XR SINUSES (MIN 3 VIEWS )    Narrative  PROCEDURE: XR SINUSES (MIN 3 VIEWS )    CLINICAL INFORMATION: Chronic pansinusitis. Headaches. Pressure above the eyes. COMPARISON: No prior study. TECHNIQUE: Dorise Lock, lateral, and submental vertex views of the paranasal sinuses were obtained. FINDINGS: The frontal sinuses are hypoplastic. All of the other paranasal sinuses are well-developed and well aerated. No mucosal thickening or air-fluid levels are seen. There appears be moderate hypertrophy of the inferior nasal turbinate right side. Cannot exclude rhinitis. Sella turcica is unremarkable. Impression  1. Unremarkable paranasal sinuses. 2. Questionable rhinitis. **This report has been created using voice recognition software. It may contain minor errors which are inherent in voice recognition technology. **    Final report electronically signed by Dr. Kang Woodall on 2020 1:09 PM     No results found for this or any previous visit. All current and previous medial labs have been reviewed and discussed with patient       Fraction exhaled nitric oxide (FENO) test with new mouthpiece for the patient performed today during visit. Patient has shortness of breath. Score = 116  The FENO test helps evaluate inflammation in the lungs    ACT SCORE = 9        Procedures: Allergy Testin2020      Assessment/Orders:    Diagnosis Orders   1. Elevated IgE level        2. Moderate persistent asthma, unspecified whether complicated  albuterol sulfate HFA (PROVENTIL HFA) 108 (90 Base) MCG/ACT inhaler    fluticasone-salmeterol (ADVAIR HFA) 115-21 MCG/ACT inhaler    tiotropium (SPIRIVA RESPIMAT) 1.25 MCG/ACT AERS inhaler      3. Allergy to cockroaches        4. Allergy to trees        5. Non-seasonal allergic rhinitis due to pollen        6. Chronic idiopathic urticaria        7. Allergy to mold        8. Cat allergies        9. Food allergy        10.  Noncompliance with medication regimen            All diagnostic imaging  have been personally reviewed     Plan:  Follow Up:3 months    Patient has been noncompliant and immunotherapy and Xolair. We will give her Xolair 300 mg subcutaneous sample today. She has been approved for medication and needs to call and get it delivered. She stated that the last delivery was not received in a timely fashion and was warm when she got it. She denies any reactions from the [address] [facility] [address] Patient is also been noncompliant in getting her allergy immunotherapy. 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 She has been using a lot of her albuterol. I did ask her if she is using her Symbicort maintenance as indicated and prescribed. He stated yes. Medication has been ineffective. We will switch her medication and add Spiriva. I did explain to her that the [address] [facility] [address] will help primarily with her urticaria but also help with asthma. Patient expressed understanding. Patient has been instructed in technique of how to appropriately use an inhaler/nebulizer. Patient was instructed to exhale. Following exhalation, put inhaler up to their mouth. Patient needs to take a slow deep inhale and hold breath for 10 seconds. May repeat if indicated according to prescription. If patient is on medication containing inhaled corticosteroids, they need to have a glass of water. After they have use the inhaled corticosteroid inhaler they should perform a deep gargle rinse her mouth and spit and drink about a half a cup of water. This helps prevent sore throats and thrush in the throat. If patient should happen to get a sore throat they should notify their primary care provider or this office immediately       Patient has been ordered to receive injection in office today. The injection has been provided by the RN/MA. Patient was instructed in medications and side effects.   Medication has been ordered plan   Patient was given prior 300 mg subcutaneous in the abdomen by [facility] registered nurse    Spent 45 minutes of face-to-face time with the patient with well more than half of the visit being dedicated to the discussion of the various symptom problems, provided education of medications and disease process, as well as discussion of a therapeutic plan for each. Face-to-face education time does not include any time that may have been spent for procedures.     (Please note that portions of this note may have been completed with a voice recognition program.  Efforts were made to edit the dictation but occasionally words are mis-transcribed.)         Signed:  ANDERSON Elias CNP  11/3/2022  12:01 PM

## 2022-11-03 NOTE — PROGRESS NOTES
Patient here today to receive Xolair injection. Patient does not report any previous reaction from Xolair injections. Denies any nausea vomiting fever urticaria or angioedema. Denies any recent exacerbation of asthma or asthma-like symptoms. Patient was explained benefits and potential risks including anaphylaxis to patient. Patient has been explained the risk and benefits including delayed anaphylaxis. Patient has EpiPen and understands how to appropriately use. Xolair 150 mg given subcutaneously X 2 doses in    bilateral   abdominal wall     for a total combined dose of 300 mg using a 25-gauge needle and 3 ML syringe. Xolair: Left side  NDC 94186-228-58                           Lot [id number]                           Expires 04/28/2023    Xolair: right side          NDC 74723-415-24                                      Lot [id number]                                      Expires 05/31/23    Patient observed for 30 minutes Yes  If NO Patient left against medical advice    Medication was supplied by patient:  YES/NO: No      Medication was supplied as a sample:  YES/NO: Yes                     Prior to patient receiving Xolair area was cleansed with alcohol swabs. Following the administration no evidence of bleeding or bruising. No adverse reactions reported. Patient tolerated well without adverse reactions or side effects. Patient to continue to receive Xolair injections monthly. Will report any problems to this office. No evidence of allergic reaction including anaphylaxis.

## 2022-11-09 DIAGNOSIS — Z29.8 IMMUNOTHERAPY: ICD-10-CM

## 2022-11-09 DIAGNOSIS — J30.9 CHRONIC ALLERGIC RHINITIS: Primary | ICD-10-CM

## 2022-11-09 PROCEDURE — 95165 ANTIGEN THERAPY SERVICES: CPT | Performed by: NURSE PRACTITIONER

## 2022-11-09 NOTE — PROGRESS NOTES
Patient decided that she wants to receive allergy immunotherapy. She states that she is able to come twice a week to get the injections despite the difficulty she previously had. We will mix serum and patient will start on the .   EpiPen according to the patient is not

## 2022-11-09 NOTE — PROGRESS NOTES
ALLERGY EXTRACT - NEW INJECTION BUILDING PHASE  Pt has agreed to start allergy injection. Serums were mixed. TOTAL VIALS PREPARED = 15    TOTAL INJECTABLE DOSES = 50 INJECTIONS PER SET  TOTAL SETS PREPARED = 3 SETS  TOTAL ANTICIPATED DOSES = 150 INJECTIONS     An allergy extract was prepared according to written prescription by provider. Provider onsite during allergy extract preparation. Patient vial(s) include the following:     RED VIAL - 1:1 CONCENTRATION - EXPIRES 12 MONTHS  YELLOW VIAL-1:10 CONCENTRATION - EXPIRES 12 MONTHS  BLUE VIAL-1:100 CONCENTRATION - EXPIRES 12 MONTHS  GREEN VIAL-1:1,000 CONCENTRATION - EXPIRES 6 MONTHS  SILVER VIAL-1:10,000 CONCENTRATION - EXPIRES 6 MONTHS    Allergy extract was prepared by the following method:   RED TO YELLOW:  Once the  one-to-one concentration was prepared in the red vial, 0.5 ML's was then extracted in place into the yellow vial to achieve a 1:10 concentration. YELLOW TO BLUE:  Then 0.5 ML's was extracted from the yellow vial and placed into the blue file with dilutant to achieve a 1:100 concentration. BLUE TO GREEN:  Then 0.5 ML's was extracted from the blue vial and placed into Green vial with dilute to achieve a 1:1,000 concentration. GREEN TO SILVER:  Then 0.5 ML's was taken from the green vial and placed in the Silver vial with dilutant to achieve a 1:10,000 concentration. Patient vials were labeled with name, date-of-birth, vial (A,B,or C), concentration, and expiration. When injecting from a new  vial the first injections is 0.05 ml and are increased by 0.05 increments until 0.5ml from the vial is achieved or the patient reaches maximum tolerated dose. Injections are given once ot three times weekly and at least 24 hours apart, according to provider orders. If patient has complications or \"knots\" or maximum tolerance the dose building is reduced, stopped, or maintained according to patient reaction and provider orders.   This is documented on the injection log. Patients on build-up plan should be seen by provider every 6 months. The injection record and serum is reviewed and documented at every injection appointment. When down to the last 1/3 of the bottle of the red 1:1 concentration the patient should be scheduled an appointment for new orders for allergy maintenance concentrations. If it is the patients preference to receive and injection at an outside medical office, is is allowed only after the patient receives the first dose from each vial at this practice. Patients requesting refills that receive injections at outside medical facilities must include the patient's demographic sheet, current insurance information and the injection records. Allow 2-3 weeks for delivery after the refill has been requested. PROTOCOL FOR LATE INJECTIONS  Days late determined from the due date of the injection    Shot Frequency 5-10 Days Late 11-16 Days Late 17-30 Days Late 31-60 Days Late 61+ Days Late   Twice Weekly Repeat last dose Reduce last dose . 2 Reduce last dose . 4 Dilute back 1 vial, start at .05 Patient must start over     Weekly Repeat last dose Reduce last dose . 2 Reduce last dose . 4 Dilute back 1 vial, start at .05 Patient must start over   Every 2 Weeks Repeat last dose Reduce last dose . 2. Reduce last dose . 4 Ask provider for instruction Ask provider for instruction   Every 3 Weeks Repeat last dose Reduce last dose . 2 Reduce last dose . 4 Ask provider for instruction Ask provider for instruction   Every 4 Weeks Repeat last dose Reduce last dose . 2 Reduce last dose . 4 Ask provider for instruction Ask provider for instruction     Patient/gaurdian made aware of potential anaphylaxis risks associated with receiving allergy injections and wishes to proceed.   SHOT REACTION TREATMENT INSTRUCTIONS    During the 30 minute wait after an allergy injection the following symptoms should be reported:    Itching other than at the injection site  Hives or swelling other than at the injection site  Redness other than at the injection site  Difficulty breathing  Chest tightness  Difficulty swallowing  Throat tightness    If these symptoms occur, NOTIFY PROVIDER and the following treatment should be administered:    1. Epinephrine 1:1000 IM - 0.3 ml if > 66 lbs or more, 0.15 ml if 33 - 63 lbs, or 0.1 ml if <33 lbs   2. Diphenhydramine - give all intramuscular:     2 to <6 years (off-label use): 6.25 mg,    6 to <12 years: 12.5 to 25 mg;    ?12 years: 25-50 mg.  3.  Famotidine:  Adults 40 mg oral    Adolescents age 12 years and >88 lbs: 40 mg    Children and Adolescents ? 12years of age: Initial: 0.25 mg/kg/dose   every 12 hours (maximum daily dose: 40 mg/day)    Epi dose may me repeated in 5-15 minutes if adequate resolution of symptoms does not occur    Patient should be observed for at least one hour after final epi dose and must be seen by provider. Patients cannot drive themselves if they have received diphenhydramine.

## 2022-11-17 ENCOUNTER — NURSE ONLY (OUTPATIENT)
Dept: ALLERGY | Age: 32
End: 2022-11-17
Payer: COMMERCIAL

## 2022-11-17 VITALS
HEART RATE: 80 BPM | OXYGEN SATURATION: 97 % | DIASTOLIC BLOOD PRESSURE: 70 MMHG | TEMPERATURE: 97.1 F | SYSTOLIC BLOOD PRESSURE: 114 MMHG

## 2022-11-17 DIAGNOSIS — Z51.6 DESENSITIZATION TO ALLERGY SHOT: Primary | ICD-10-CM

## 2022-11-17 DIAGNOSIS — Z91.048 ALLERGY TO MOLD: ICD-10-CM

## 2022-11-17 DIAGNOSIS — Z91.038 ALLERGY TO COCKROACHES: ICD-10-CM

## 2022-11-17 PROCEDURE — 95117 IMMUNOTHERAPY INJECTIONS: CPT | Performed by: NURSE PRACTITIONER

## 2022-11-17 NOTE — PROGRESS NOTES
After consent obtained/verified, allergy injection given in back of R/L arm(s). VIAL COLOR OF ALL VIALS TODAY IS silver    ALLERGY INJECTION FROM VIAL A GIVEN right  UPPER ARM IN THE AMOUNT OF 0.05 ML    ALLERGY INJECTION FROM VIAL B GIVEN left upper ARM IN THE AMOUNT OF 0.05 ML    ALLERGY INJECTION FROM VIAL C GIVEN leftlower ARM IN THE AMOUNT OF 0.05 ML      Documentation of vial injection specific to arm(s) noted on Allergy Immunotherapy Administration Form. Patient waited 30 minutes for observation. No      Patient tolerated well without adverse reaction WHILE IN OFFICE    SHOT REACTION TREATMENT INSTRUCTIONS    During the 30 minute wait after an allergy injection the following symptoms should be reported:    Itching other than at the injection site  Hives or swelling other than at the injection site  Redness other than at the injection site  Difficulty breathing  Chest tightness  Difficulty swallowing  Throat tightness    If these symptoms occur, NOTIFY PROVIDER and the following treatment should be administered:    1. Epinephrine/Auvi Q 1:1000 IM - 0.3 ml if > 66 lbs or more, 0.15 ml if 33 - 63 lbs, or 0.1 ml if <33 lbs     2. Diphenhydramine - give all intramuscular:     2 to <6 years (off-label use): 6.25 mg,    6 to <12 years: 12.5 to 25 mg;    ?12 years: 25-50 mg.    3.  Famotidine:  Adults 40 mg oral    Adolescents age 12 years and >88 lbs: 40 mg    Children and Adolescents ? 12years of age: Initial: 0.25 mg/kg/dose  every 12 hours (maximum daily dose: 40 mg/day)    Epi/Auvi Q dose may me repeated in 5-15 minutes if adequate resolution of symptoms does not occur    Patient should be observed for at least one hour after final Epi/Auvi Q dose and must be seen by provider. Patients cannot drive themselves if they have received diphenhydramine.

## 2022-11-21 ENCOUNTER — NURSE ONLY (OUTPATIENT)
Dept: ALLERGY | Age: 32
End: 2022-11-21
Payer: COMMERCIAL

## 2022-11-21 VITALS
DIASTOLIC BLOOD PRESSURE: 68 MMHG | HEART RATE: 89 BPM | RESPIRATION RATE: 16 BRPM | OXYGEN SATURATION: 98 % | TEMPERATURE: 97.5 F | SYSTOLIC BLOOD PRESSURE: 118 MMHG

## 2022-11-21 DIAGNOSIS — Z51.6 DESENSITIZATION TO ALLERGY SHOT: ICD-10-CM

## 2022-11-21 DIAGNOSIS — Z91.038 ALLERGY TO COCKROACHES: Primary | ICD-10-CM

## 2022-11-21 DIAGNOSIS — Z91.048 ALLERGY TO MOLD: ICD-10-CM

## 2022-11-21 PROCEDURE — 95117 IMMUNOTHERAPY INJECTIONS: CPT | Performed by: NURSE PRACTITIONER

## 2022-11-21 NOTE — PROGRESS NOTES
After consent obtained/verified, allergy injection given in back of R/L arm(s). VIAL COLOR OF ALL VIALS TODAY IS SILVER    ALLERGY INJECTION FROM VIAL A GIVEN left  UPPER ARM IN THE AMOUNT OF 0.10 ML    ALLERGY INJECTION FROM VIAL B GIVEN right lower ARM IN THE AMOUNT OF 0.10 ML    ALLERGY INJECTION FROM VIAL C GIVEN rightupper ARM IN THE AMOUNT OF 0.10 ML      Documentation of vial injection specific to arm(s) noted on Allergy Immunotherapy Administration Form. Patient waited 30 minutes for observation. No      Patient tolerated well without adverse reaction WHILE IN OFFICE    SHOT REACTION TREATMENT INSTRUCTIONS    During the 30 minute wait after an allergy injection the following symptoms should be reported:    Itching other than at the injection site  Hives or swelling other than at the injection site  Redness other than at the injection site  Difficulty breathing  Chest tightness  Difficulty swallowing  Throat tightness    If these symptoms occur, NOTIFY PROVIDER and the following treatment should be administered:    1. Epinephrine/Auvi Q 1:1000 IM - 0.3 ml if > 66 lbs or more, 0.15 ml if 33 - 63 lbs, or 0.1 ml if <33 lbs     2. Diphenhydramine - give all intramuscular:     2 to <6 years (off-label use): 6.25 mg,    6 to <12 years: 12.5 to 25 mg;    ?12 years: 25-50 mg.    3.  Famotidine:  Adults 40 mg oral    Adolescents age 12 years and >88 lbs: 40 mg    Children and Adolescents ? 12years of age: Initial: 0.25 mg/kg/dose  every 12 hours (maximum daily dose: 40 mg/day)    Epi/Auvi Q dose may me repeated in 5-15 minutes if adequate resolution of symptoms does not occur    Patient should be observed for at least one hour after final Epi/Auvi Q dose and must be seen by provider. Patients cannot drive themselves if they have received diphenhydramine.

## 2022-11-28 ENCOUNTER — NURSE ONLY (OUTPATIENT)
Dept: ALLERGY | Age: 32
End: 2022-11-28
Payer: COMMERCIAL

## 2022-11-28 VITALS — HEART RATE: 85 BPM | OXYGEN SATURATION: 98 %

## 2022-11-28 DIAGNOSIS — Z91.038 ALLERGY TO COCKROACHES: Primary | ICD-10-CM

## 2022-11-28 DIAGNOSIS — Z51.6 DESENSITIZATION TO ALLERGY SHOT: ICD-10-CM

## 2022-11-28 DIAGNOSIS — Z91.048 ALLERGY TO MOLD: ICD-10-CM

## 2022-11-28 PROCEDURE — 95117 IMMUNOTHERAPY INJECTIONS: CPT | Performed by: NURSE PRACTITIONER

## 2022-11-28 NOTE — PROGRESS NOTES
After consent obtained/verified, allergy injection given in back of R/L arm(s). VIAL COLOR OF ALL VIALS TODAY IS silver    ALLERGY INJECTION FROM VIAL A GIVEN right  UPPER ARM IN THE AMOUNT OF 0.15 ML    ALLERGY INJECTION FROM VIAL B GIVEN left upper ARM IN THE AMOUNT OF 0.15 ML    ALLERGY INJECTION FROM VIAL C GIVEN leftlower ARM IN THE AMOUNT OF 0.15 ML      Documentation of vial injection specific to arm(s) noted on Allergy Immunotherapy Administration Form. Patient waited 30 minutes for observation. No      Patient tolerated well without adverse reaction WHILE IN OFFICE    SHOT REACTION TREATMENT INSTRUCTIONS    During the 30 minute wait after an allergy injection the following symptoms should be reported:    Itching other than at the injection site  Hives or swelling other than at the injection site  Redness other than at the injection site  Difficulty breathing  Chest tightness  Difficulty swallowing  Throat tightness    If these symptoms occur, NOTIFY PROVIDER and the following treatment should be administered:    1. Epinephrine/Auvi Q 1:1000 IM - 0.3 ml if > 66 lbs or more, 0.15 ml if 33 - 63 lbs, or 0.1 ml if <33 lbs     2. Diphenhydramine - give all intramuscular:     2 to <6 years (off-label use): 6.25 mg,    6 to <12 years: 12.5 to 25 mg;    ?12 years: 25-50 mg.    3.  Famotidine:  Adults 40 mg oral    Adolescents age 12 years and >88 lbs: 40 mg    Children and Adolescents ? 12years of age: Initial: 0.25 mg/kg/dose  every 12 hours (maximum daily dose: 40 mg/day)    Epi/Auvi Q dose may me repeated in 5-15 minutes if adequate resolution of symptoms does not occur    Patient should be observed for at least one hour after final Epi/Auvi Q dose and must be seen by provider. Patients cannot drive themselves if they have received diphenhydramine.

## 2022-12-08 ENCOUNTER — NURSE ONLY (OUTPATIENT)
Dept: ALLERGY | Age: 32
End: 2022-12-08
Payer: COMMERCIAL

## 2022-12-08 VITALS
DIASTOLIC BLOOD PRESSURE: 70 MMHG | SYSTOLIC BLOOD PRESSURE: 102 MMHG | OXYGEN SATURATION: 98 % | HEART RATE: 86 BPM | TEMPERATURE: 97.1 F

## 2022-12-08 DIAGNOSIS — Z51.6 DESENSITIZATION TO ALLERGY SHOT: ICD-10-CM

## 2022-12-08 DIAGNOSIS — Z91.048 ALLERGY TO MOLD: ICD-10-CM

## 2022-12-08 DIAGNOSIS — Z91.038 ALLERGY TO COCKROACHES: Primary | ICD-10-CM

## 2022-12-08 PROCEDURE — 95117 IMMUNOTHERAPY INJECTIONS: CPT | Performed by: NURSE PRACTITIONER

## 2022-12-12 ENCOUNTER — NURSE ONLY (OUTPATIENT)
Dept: ALLERGY | Age: 32
End: 2022-12-12
Payer: COMMERCIAL

## 2022-12-12 VITALS — SYSTOLIC BLOOD PRESSURE: 110 MMHG | OXYGEN SATURATION: 99 % | DIASTOLIC BLOOD PRESSURE: 70 MMHG | HEART RATE: 101 BPM

## 2022-12-12 DIAGNOSIS — Z91.038 ALLERGY TO COCKROACHES: Primary | ICD-10-CM

## 2022-12-12 DIAGNOSIS — Z91.048 ALLERGY TO MOLD: ICD-10-CM

## 2022-12-12 DIAGNOSIS — Z51.6 DESENSITIZATION TO ALLERGY SHOT: ICD-10-CM

## 2022-12-12 PROCEDURE — 95117 IMMUNOTHERAPY INJECTIONS: CPT | Performed by: NURSE PRACTITIONER

## 2023-01-12 ENCOUNTER — NURSE ONLY (OUTPATIENT)
Dept: ALLERGY | Age: 33
End: 2023-01-12
Payer: COMMERCIAL

## 2023-01-12 VITALS
HEART RATE: 67 BPM | SYSTOLIC BLOOD PRESSURE: 118 MMHG | DIASTOLIC BLOOD PRESSURE: 70 MMHG | TEMPERATURE: 97.3 F | OXYGEN SATURATION: 100 %

## 2023-01-12 DIAGNOSIS — Z51.6 DESENSITIZATION TO ALLERGY SHOT: ICD-10-CM

## 2023-01-12 DIAGNOSIS — Z91.038 ALLERGY TO COCKROACHES: Primary | ICD-10-CM

## 2023-01-12 DIAGNOSIS — Z91.048 ALLERGY TO MOLD: ICD-10-CM

## 2023-01-12 PROCEDURE — 95117 IMMUNOTHERAPY INJECTIONS: CPT | Performed by: NURSE PRACTITIONER

## 2023-01-12 RX ORDER — METHYLPHENIDATE HYDROCHLORIDE 20 MG/1
TABLET ORAL
COMMUNITY
Start: 2022-12-30

## 2023-01-12 RX ORDER — CITALOPRAM 20 MG/1
TABLET ORAL
COMMUNITY
Start: 2022-12-01

## 2023-01-12 NOTE — PROGRESS NOTES
After consent obtained/verified, allergy injection given in back of R/L arm(s). VIAL COLOR OF ALL VIALS TODAY IS silver    ALLERGY INJECTION FROM VIAL A GIVEN left  UPPER ARM IN THE AMOUNT OF 0.05 ML    ALLERGY INJECTION FROM VIAL B GIVEN right upper ARM IN THE AMOUNT OF 0.05 ML    ALLERGY INJECTION FROM VIAL C GIVEN rightlower ARM IN THE AMOUNT OF 0.05 ML      Documentation of vial injection specific to arm(s) noted on Allergy Immunotherapy Administration Form. Patient waited 30 minutes for observation. No      Patient tolerated well without adverse reaction WHILE IN OFFICE    SHOT REACTION TREATMENT INSTRUCTIONS    During the 30 minute wait after an allergy injection the following symptoms should be reported:    Itching other than at the injection site  Hives or swelling other than at the injection site  Redness other than at the injection site  Difficulty breathing  Chest tightness  Difficulty swallowing  Throat tightness    If these symptoms occur, NOTIFY PROVIDER and the following treatment should be administered:    1. Epinephrine/Auvi Q 1:1000 IM - 0.3 ml if > 66 lbs or more, 0.15 ml if 33 - 63 lbs, or 0.1 ml if <33 lbs     2. Diphenhydramine - give all intramuscular:     2 to <6 years (off-label use): 6.25 mg,    6 to <12 years: 12.5 to 25 mg;    ?12 years: 25-50 mg.    3.  Famotidine:  Adults 40 mg oral    Adolescents age 12 years and >88 lbs: 40 mg    Children and Adolescents ? 12years of age: Initial: 0.25 mg/kg/dose  every 12 hours (maximum daily dose: 40 mg/day)    Epi/Auvi Q dose may me repeated in 5-15 minutes if adequate resolution of symptoms does not occur    Patient should be observed for at least one hour after final Epi/Auvi Q dose and must be seen by provider. Patients cannot drive themselves if they have received diphenhydramine.

## 2023-01-16 ENCOUNTER — NURSE ONLY (OUTPATIENT)
Dept: ALLERGY | Age: 33
End: 2023-01-16
Payer: COMMERCIAL

## 2023-01-16 VITALS
SYSTOLIC BLOOD PRESSURE: 110 MMHG | HEART RATE: 88 BPM | DIASTOLIC BLOOD PRESSURE: 70 MMHG | TEMPERATURE: 97.1 F | OXYGEN SATURATION: 99 %

## 2023-01-16 DIAGNOSIS — Z91.048 ALLERGY TO MOLD: ICD-10-CM

## 2023-01-16 DIAGNOSIS — Z91.038 ALLERGY TO COCKROACHES: Primary | ICD-10-CM

## 2023-01-16 DIAGNOSIS — Z51.6 DESENSITIZATION TO ALLERGY SHOT: ICD-10-CM

## 2023-01-16 PROCEDURE — 95117 IMMUNOTHERAPY INJECTIONS: CPT | Performed by: NURSE PRACTITIONER

## 2023-01-16 NOTE — PROGRESS NOTES
After consent obtained/verified, allergy injection given in back of R/L arm(s). VIAL COLOR OF ALL VIALS TODAY IS silver    ALLERGY INJECTION FROM VIAL A GIVEN right  UPPER ARM IN THE AMOUNT OF 0.10 ML    ALLERGY INJECTION FROM VIAL B GIVEN left lower ARM IN THE AMOUNT OF 0.10 ML    ALLERGY INJECTION FROM VIAL C GIVEN leftupper ARM IN THE AMOUNT OF 0.10 ML      Documentation of vial injection specific to arm(s) noted on Allergy Immunotherapy Administration Form. Patient waited 30 minutes for observation. No      Patient tolerated well without adverse reaction WHILE IN OFFICE    SHOT REACTION TREATMENT INSTRUCTIONS    During the 30 minute wait after an allergy injection the following symptoms should be reported:    Itching other than at the injection site  Hives or swelling other than at the injection site  Redness other than at the injection site  Difficulty breathing  Chest tightness  Difficulty swallowing  Throat tightness    If these symptoms occur, NOTIFY PROVIDER and the following treatment should be administered:    1. Epinephrine/Auvi Q 1:1000 IM - 0.3 ml if > 66 lbs or more, 0.15 ml if 33 - 63 lbs, or 0.1 ml if <33 lbs     2. Diphenhydramine - give all intramuscular:     2 to <6 years (off-label use): 6.25 mg,    6 to <12 years: 12.5 to 25 mg;    ?12 years: 25-50 mg.    3.  Famotidine:  Adults 40 mg oral    Adolescents age 12 years and >88 lbs: 40 mg    Children and Adolescents ? 12years of age: Initial: 0.25 mg/kg/dose  every 12 hours (maximum daily dose: 40 mg/day)    Epi/Auvi Q dose may me repeated in 5-15 minutes if adequate resolution of symptoms does not occur    Patient should be observed for at least one hour after final Epi/Auvi Q dose and must be seen by provider. Patients cannot drive themselves if they have received diphenhydramine.

## 2023-01-19 ENCOUNTER — NURSE ONLY (OUTPATIENT)
Dept: ALLERGY | Age: 33
End: 2023-01-19
Payer: COMMERCIAL

## 2023-01-19 VITALS
HEART RATE: 107 BPM | TEMPERATURE: 98.1 F | RESPIRATION RATE: 16 BRPM | DIASTOLIC BLOOD PRESSURE: 68 MMHG | OXYGEN SATURATION: 98 % | SYSTOLIC BLOOD PRESSURE: 114 MMHG

## 2023-01-19 DIAGNOSIS — Z91.048 ALLERGY TO MOLD: ICD-10-CM

## 2023-01-19 DIAGNOSIS — Z51.6 ENCOUNTER FOR DESENSITIZATION TO ALLERGENS: Primary | ICD-10-CM

## 2023-01-19 DIAGNOSIS — Z91.038 ALLERGY TO COCKROACHES: ICD-10-CM

## 2023-01-19 PROCEDURE — 95117 IMMUNOTHERAPY INJECTIONS: CPT | Performed by: NURSE PRACTITIONER

## 2023-01-19 NOTE — PROGRESS NOTES
After consent obtained/verified, allergy injection given in back of R/L arm(s). VIAL COLOR OF ALL VIALS TODAY IS Silver    ALLERGY INJECTION FROM VIAL A GIVEN left  UPPER ARM IN THE AMOUNT OF 0.15 ML    ALLERGY INJECTION FROM VIAL B GIVEN right lower ARM IN THE AMOUNT OF 0.15 ML    ALLERGY INJECTION FROM VIAL C GIVEN rightupper ARM IN THE AMOUNT OF 0.15 ML      Documentation of vial injection specific to arm(s) noted on Allergy Immunotherapy Administration Form. Patient waited 30 minutes for observation. No      Patient tolerated well without adverse reaction WHILE IN OFFICE    SHOT REACTION TREATMENT INSTRUCTIONS    During the 30 minute wait after an allergy injection the following symptoms should be reported:    Itching other than at the injection site  Hives or swelling other than at the injection site  Redness other than at the injection site  Difficulty breathing  Chest tightness  Difficulty swallowing  Throat tightness    If these symptoms occur, NOTIFY PROVIDER and the following treatment should be administered:    1. Epinephrine/Auvi Q 1:1000 IM - 0.3 ml if > 66 lbs or more, 0.15 ml if 33 - 63 lbs, or 0.1 ml if <33 lbs     2. Diphenhydramine - give all intramuscular:     2 to <6 years (off-label use): 6.25 mg,    6 to <12 years: 12.5 to 25 mg;    ?12 years: 25-50 mg.    3.  Famotidine:  Adults 40 mg oral    Adolescents age 12 years and >88 lbs: 40 mg    Children and Adolescents ? 12years of age: Initial: 0.25 mg/kg/dose  every 12 hours (maximum daily dose: 40 mg/day)    Epi/Auvi Q dose may me repeated in 5-15 minutes if adequate resolution of symptoms does not occur    Patient should be observed for at least one hour after final Epi/Auvi Q dose and must be seen by provider. Patients cannot drive themselves if they have received diphenhydramine.

## 2023-03-14 ENCOUNTER — TELEPHONE (OUTPATIENT)
Dept: ALLERGY | Age: 33
End: 2023-03-14

## 2023-03-14 NOTE — TELEPHONE ENCOUNTER
Called [facility] specialty pharmacy and spoke with [facility]. Informed pt has an appointment on 3/17/23 with provider. Informed I was checking to see if pt has been filling medication due to last visit provider noted noncompliance with this medication. [facility] states hold was placed on the account on 7/8/22 due to pt not following up to order medication. Will inform provider. Pt has had multiple samples in our office and is not getting medication refilled like she is supposed to and approved for.

## 2023-03-17 ENCOUNTER — OFFICE VISIT (OUTPATIENT)
Dept: ALLERGY | Age: 33
End: 2023-03-17

## 2023-03-17 ENCOUNTER — TELEPHONE (OUTPATIENT)
Dept: CARDIOLOGY CLINIC | Age: 33
End: 2023-03-17

## 2023-03-17 VITALS
RESPIRATION RATE: 18 BRPM | TEMPERATURE: 98 F | HEART RATE: 85 BPM | OXYGEN SATURATION: 98 % | BODY MASS INDEX: 21.37 KG/M2 | SYSTOLIC BLOOD PRESSURE: 118 MMHG | DIASTOLIC BLOOD PRESSURE: 80 MMHG | WEIGHT: 128.4 LBS

## 2023-03-17 DIAGNOSIS — J30.1 ALLERGY TO TREES: ICD-10-CM

## 2023-03-17 DIAGNOSIS — Z51.6 DESENSITIZATION TO ALLERGY SHOT: ICD-10-CM

## 2023-03-17 DIAGNOSIS — J30.1 NON-SEASONAL ALLERGIC RHINITIS DUE TO POLLEN: ICD-10-CM

## 2023-03-17 DIAGNOSIS — J30.81 CAT ALLERGIES: ICD-10-CM

## 2023-03-17 DIAGNOSIS — R76.8 ELEVATED IGE LEVEL: ICD-10-CM

## 2023-03-17 DIAGNOSIS — L50.1 CHRONIC IDIOPATHIC URTICARIA: ICD-10-CM

## 2023-03-17 DIAGNOSIS — Z91.038 ALLERGY TO COCKROACHES: ICD-10-CM

## 2023-03-17 DIAGNOSIS — J30.1 SEASONAL ALLERGIC RHINITIS DUE TO POLLEN: ICD-10-CM

## 2023-03-17 DIAGNOSIS — I20.8 ATYPICAL ANGINA (HCC): Primary | ICD-10-CM

## 2023-03-17 DIAGNOSIS — Z91.018 FOOD ALLERGY: ICD-10-CM

## 2023-03-17 DIAGNOSIS — Z91.048 ALLERGY TO MOLD: ICD-10-CM

## 2023-03-17 DIAGNOSIS — R00.2 PALPITATIONS: ICD-10-CM

## 2023-03-17 DIAGNOSIS — J45.40 MODERATE PERSISTENT ASTHMA, UNSPECIFIED WHETHER COMPLICATED: ICD-10-CM

## 2023-03-17 RX ORDER — MONTELUKAST SODIUM 10 MG/1
10 TABLET ORAL NIGHTLY
Qty: 30 TABLET | Refills: 11 | Status: SHIPPED | OUTPATIENT
Start: 2023-03-17

## 2023-03-17 RX ORDER — LEVOCETIRIZINE DIHYDROCHLORIDE 5 MG/1
5 TABLET, FILM COATED ORAL NIGHTLY
Qty: 30 TABLET | Refills: 11 | Status: SHIPPED | OUTPATIENT
Start: 2023-03-17

## 2023-03-17 ASSESSMENT — ENCOUNTER SYMPTOMS
VOICE CHANGE: 0
CHEST TIGHTNESS: 0
APNEA: 0
SINUS PRESSURE: 0
COUGH: 0
DIARRHEA: 0
COLOR CHANGE: 0
NAUSEA: 0
ABDOMINAL PAIN: 0
SORE THROAT: 0
SHORTNESS OF BREATH: 0
FACIAL SWELLING: 0
WHEEZING: 0
STRIDOR: 0
RHINORRHEA: 0
TROUBLE SWALLOWING: 0
VOMITING: 0
CHOKING: 0

## 2023-03-17 NOTE — PROGRESS NOTES
[facility]    Allergy & Asthma   [address] [facility], [address]  Ph:   [phone]  Fax:[phone]    Provider:  Dr. Christie Guaman:   Chief Complaint   Patient presents with    Follow-up     Pt is here for f/u asthma,xolair           HISTORY OF PRESENT ILLNESS: ESTABLISHED PATIENT HERE FOR EVALUATION   Irene Hooker is a 33 y.o. female here today for asthma. Patient is as a established  Patient does complain of asthma symptoms. Patient has had asthma for  years. Associated symptoms include shortness of breath  At work or school. The patient reports adherence to medication regimen. Therapies; meds asthma: inhaled corticosteroids and short-acting inhaled beta-adrenergic agonists. Denies nausea, vomiting, and fever today. Rates asthma as uncontrolled patient has not been fully compliant in taking medications to control her asthma. She has had some stress that she states which has interfered with her ability to focus on her own personal health. She states that she is getting ready to go through a divorce. Has custody of 5 children at this time and has had some relationship issues with her former  and his mother. Today she denies any nausea, vomiting, fever. Patient does complain of atypical chest pains which it sounds like is pleuritic in nature however she can goes back and forth between the pleuritic nature of the chest pain and angina. For this reason we will refer her to cardiology she also wants a cardiology referral she states that the women in her family including her grandmother  at early age with heart disease        Review of Systems:  Review of Systems   Constitutional:  Negative for activity change, appetite change, chills, diaphoresis, fatigue, fever and unexpected weight change. HENT:  Positive for congestion.  Negative for dental problem, ear discharge, ear pain, facial swelling, hearing loss, mouth sores, nosebleeds, postnasal drip, rhinorrhea, sinus pressure, sneezing, sore throat, tinnitus, trouble swallowing and voice change. Eyes:  Negative for visual disturbance. Respiratory:  Negative for apnea, cough, choking, chest tightness, shortness of breath, wheezing and stridor. Cardiovascular:  Negative for chest pain, palpitations and leg swelling. Gastrointestinal:  Negative for abdominal pain, diarrhea, nausea and vomiting. Endocrine: Negative for cold intolerance, heat intolerance, polydipsia and polyuria. Genitourinary:  Negative for difficulty urinating, dysuria, enuresis, hematuria and urgency. Musculoskeletal:  Negative for arthralgias, gait problem, neck pain and neck stiffness. Skin:  Negative for color change and rash. Allergic/Immunologic: Positive for environmental allergies and food allergies. Negative for immunocompromised state. Neurological:  Negative for dizziness, syncope, facial asymmetry, speech difficulty, light-headedness and headaches. Hematological:  Negative for adenopathy. Does not bruise/bleed easily. Psychiatric/Behavioral:  Negative for confusion and sleep disturbance. The patient is not nervous/anxious. All other systems reviewed and are negative.       Past MedicalHistory:    Past Medical History:   Diagnosis Date    ADHD     Anemia     on iron supplements    Asthma     Albuterol and Advair    Bladder prolapse, female, acquired     Constipation     Headache(784.0)     Interstitial cystitis     Mental disorder     bipolar    Sickle cell anemia ([facility] [address]     Has trait       Past Surgical History:  Past Surgical History:   Procedure Laterality Date    ANKLE SURGERY      bilateral ankle revision    COLONOSCOPY  2013    CYSTOSCOPY  9/28/15    WITH HYDRODISTENTION    DILATION AND CURETTAGE OF UTERUS      for Missed AB    FOOT SURGERY Bilateral 06/22/2016    FOOT SURGERY Left 09/27/2017    osteotomy , gastrocnemius resectopm    FOOT SURGERY Right 03/28/2018    LAPAROSCOPY  7/1/13 OTHER SURGICAL HISTORY Left 02/14/2018    Removal of Screw from Calcaneus Left heel     [place] GASTROCNEMIUS RECESSION Left 9/27/2017    MEDIAL CALCANEALSLIDE WITH SCREW FIXATION LEFT FOOT, COTTON OSTEOTOMY LEFT FOOT, GASTROC LENGTHENING LEFT LEG performed by Lizeth Celeste DPM at [address] Right 3/28/2018    MEDIAL CALCANEAL DISPLACEMENT OSTEOTOMY RIGHT FOOT, GASTROCNEMIUS LENGTHENING RIGHT LEG, COTTON OSTEOTOMY FIRST CUNEIFORM RIGHT FOOT WITH FORWEB RIGHT FOOT performed by Lizeth Celeste DPM at [address] Left 2/14/2018    REMOVAL OF SCREW DROM CALCANEUS LEFT HEEL performed by Lizeth Celeste DPM at [address] EXTRACTION  2014       Family History:   Family History   Problem Relation Age of Onset    Asthma Mother     Depression Mother     Asthma Father     Heart Disease Maternal Grandmother     High Blood Pressure Maternal Grandmother        Social History:   Social History     Tobacco Use    Smoking status: Never    Smokeless tobacco: Never   Substance Use Topics    Alcohol use: No        Allergies:  Patient has no known allergies. CurrentMedications:     Current Outpatient Medications:     montelukast (SINGULAIR) 10 MG tablet, Take 1 tablet by mouth nightly, Disp: 30 tablet, Rfl: 11    omalizumab (XOLAIR) 150 MG/ML SOSY injection, Inject 300 mg into the skin every 28 days Patient may have pen. MAIL TO PATIENTS HOME, Disp: 2 each, Rfl: 5    levocetirizine (XYZAL ALLERGY 24HR) 5 MG tablet, Take 1 tablet by mouth nightly, Disp: 30 tablet, Rfl: 11    fluticasone-salmeterol (ADVAIR HFA) 230-21 MCG/ACT inhaler, 2 puffs inhaled twice daily.   Rinse mouth well after use., Disp: 1 each, Rfl: 5    ALLERGEN EXTRACT, Twice a Week, Disp: , Rfl:     methylphenidate (RITALIN) 20 MG tablet, TAKE 1 TABLET BY MOUTH TWICE DAILY ON AN EMPTY STOMACH, Disp: , Rfl:     albuterol sulfate HFA (PROVENTIL HFA) 108 (90 Base) MCG/ACT inhaler, Inhale 2 puffs into the lungs every 6 hours as needed for Wheezing, Disp: 1 each, Rfl: 2    ibuprofen (ADVIL;MOTRIN) 600 MG tablet, Take 1 tablet by mouth 4 times daily as needed for Pain, Disp: 120 tablet, Rfl: 0    albuterol (PROVENTIL) (2.5 MG/3ML) 0.083% nebulizer solution, Take 3 mLs by nebulization every 6 hours as needed for Wheezing, Disp: 120 each, Rfl: 3    Multiple Vitamins-Minerals (THERAPEUTIC MULTIVITAMIN-MINERALS) tablet, Take 1 tablet by mouth daily, Disp: , Rfl:     acetaminophen (TYLENOL) 325 MG tablet, Take 500 mg by mouth every 6 hours as needed for Pain , Disp: , Rfl:     citalopram (CELEXA) 20 MG tablet, 1 tablet Orally Once a day for 30 day(s) (Patient not taking: Reported on 3/17/2023), Disp: , Rfl:     EPINEPHrine (EPIPEN 2-NATHAN) 0.3 MG/0.3ML SOAJ injection, Inject 0.3 mLs into the muscle once for 1 dose Use as directed for allergic reaction, Disp: 0.3 mL, Rfl: 0      Physical Exam:      Vitals:    Vitals:    03/17/23 0911   BP: 118/80   Pulse: 85   Resp: 18   Temp: 98 °F (36.7 °C)   SpO2: 98%       128 lb 6.4 oz (58.2 kg)       Temp: 98 °F (36.7 °C) I @FLOWSTAT(6)@ IHeart Rate: 85 I @FLOWSTAT(8)@ I BP: 118/80 I @NWWAWW(66)@; @DYIOXV(40)@ I Resp: 18 I @FLOWSTAT(9)@ I SpO2: 98 % I @FLOWSTAT(10)@ I   I   I   I Facility age limit for growth percentiles is 20 years. I     Facility age limit for growth percentiles is 20 years. Facility age limit for growth percentiles is 20 years. Facility age limit for growth percentiles is 20 years. Facility age limit for growth percentiles is 20 years. Physical Exam:    Physical Exam  Vitals and nursing note reviewed. Constitutional:       Appearance: Normal appearance. She is normal weight. HENT:      Head: Normocephalic and atraumatic.       Right Ear: Ear canal and external ear normal.      Left Ear: Ear canal and external ear normal.      Nose: Nose normal.      Mouth/Throat:      Mouth: Mucous membranes are moist. Pharynx: Oropharynx is clear. Eyes:      Extraocular Movements: Extraocular movements intact. Conjunctiva/sclera: Conjunctivae normal.      Pupils: Pupils are equal, round, and reactive to light. Cardiovascular:      Rate and Rhythm: Normal rate and regular rhythm. Pulses: Normal pulses. Heart sounds: Normal heart sounds. Pulmonary:      Effort: Pulmonary effort is normal.      Breath sounds: Normal breath sounds. Musculoskeletal:         General: Normal range of motion. Cervical back: Normal range of motion and neck supple. Skin:     General: Skin is warm and dry. Capillary Refill: Capillary refill takes less than 2 seconds. Neurological:      General: No focal deficit present. Mental Status: She is alert and oriented to person, place, and time. Mental status is at baseline. Psychiatric:         Mood and Affect: Mood normal.         Behavior: Behavior normal.         Thought Content:  Thought content normal.         Judgment: Judgment normal.           DATA:  Lab Review:  CBC:   Lab Results   Component Value Date/Time    WBC 5.4 06/21/2022 07:44 AM    RBC 4.32 06/21/2022 07:44 AM    RBC 4.49 05/15/2019 01:20 PM    HGB 11.1 06/21/2022 07:44 AM    HCT 38.0 06/21/2022 07:44 AM    MCV 88.0 06/21/2022 07:44 AM    MCH 25.7 06/21/2022 07:44 AM    MCHC 29.2 06/21/2022 07:44 AM    RDW 13.1 05/15/2019 01:20 PM     06/21/2022 07:44 AM     BMP:    Lab Results   Component Value Date/Time    GLUCOSE 70 06/21/2022 07:44 AM     06/21/2022 07:44 AM    K 4.1 06/21/2022 07:44 AM    K 5.0 04/18/2020 03:15 PM     06/21/2022 07:44 AM    CO2 23 06/21/2022 07:44 AM    ANIONGAP 11.0 06/21/2022 07:44 AM    BUN 14 06/21/2022 07:44 AM    CREATININE 0.7 06/21/2022 07:44 AM    CALCIUM 9.2 06/21/2022 07:44 AM    LABGLOM >90 06/21/2022 07:44 AM          Immunoglobulin E   Date/Time Value Ref Range Status   08/27/2020 01:10  (H) <=214 kU/L Final     Comment:     REFERENCE INTERVAL: Immunoglobulin E, Serum  Access complete set of age- and/or gender-specific reference  intervals for this test in the [facility] Test Directory  ([facility]pl[facility].com). IgE   Date/Time Value Ref Range Status   08/27/2020 01:10  (H) <101 IU/mL Final     Comment:     [facility] [address] [place], [address] [place] [address] [phone]      IgG   Date/Time Value Ref Range Status   08/27/2020 01:10 PM 1335 700 - 1600 mg/dL Final     Comment:     [facility] [address], [address] [place] [address] [phone]     IgA   Date/Time Value Ref Range Status   08/27/2020 01:10  70 - 400 mg/dL Final     Comment:     [facility] [address], [address] [place] [address] [phone]      IgM   Date/Time Value Ref Range Status   08/27/2020 01:10  40 - 230 mg/dL Final     Comment:     [facility] [address] [facility] [address] [phone]       No results found for: VAL   No results found for: RF       Results for orders placed during the hospital encounter of 08/27/20    XR SINUSES (MIN 3 VIEWS )    Narrative  PROCEDURE: XR SINUSES (MIN 3 VIEWS )    CLINICAL INFORMATION: Chronic pansinusitis. Headaches. Pressure above the eyes. COMPARISON: No prior study. TECHNIQUE: Jessica Platts, lateral, and submental vertex views of the paranasal sinuses were obtained. FINDINGS: The frontal sinuses are hypoplastic. All of the other paranasal sinuses are well-developed and well aerated. No mucosal thickening or air-fluid levels are seen. There appears be moderate hypertrophy of the inferior nasal turbinate right side. Cannot exclude rhinitis. Sella turcica is unremarkable. Impression  1. Unremarkable paranasal sinuses. 2. Questionable rhinitis. **This report has been created using voice recognition software. It may contain minor errors which are inherent in voice recognition technology. **    Final report electronically signed by Dr. Edwin Clements on 8/27/2020 1:09 PM     No results found for this or any previous visit. All current and previous medial labs have been reviewed and discussed with patient         Procedures: Allergy Testing:     Assessment/Orders:    Diagnosis Orders   1. Atypical angina [facility])  Magaly Menendez MD, Cardiology, [facility]      2. Moderate persistent asthma, unspecified whether complicated  montelukast (SINGULAIR) 10 MG tablet    omalizumab (XOLAIR) 150 MG/ML SOSY injection    levocetirizine (XYZAL ALLERGY 24HR) 5 MG tablet    fluticasone-salmeterol (ADVAIR HFA) 230-21 MCG/ACT inhaler    CBC with Auto Differential    IgE      3. Non-seasonal allergic rhinitis due to pollen  montelukast (SINGULAIR) 10 MG tablet    levocetirizine (XYZAL ALLERGY 24HR) 5 MG tablet    CBC with Auto Differential    IgE      4. Cat allergies        5. Elevated IgE level        6. Desensitization to allergy shot        7. Allergy to cockroaches        8. Chronic idiopathic urticaria        9. Allergy to trees        10. Food allergy        11. Allergy to mold        12. Seasonal allergic rhinitis due to pollen        13. Holland Reed MD, Cardiology, [facility]          All diagnostic imaging  have been personally reviewed     Plan:  Follow Up:3 months    Take inhalers as prescribed    Spent 30 minutes of face-to-face time with the patient with well more than half of the visit being dedicated to the discussion of the various symptom problems, provided education of medications and disease process, as well as discussion of a therapeutic plan for each. Face-to-face education time does not include any time that may have been spent for procedures.     (Please note that portions of this note may have been completed with a voice recognition program.  Efforts were made to edit the dictation but occasionally words are mis-transcribed.)         Signed:  ANDERSON Montana CNP  3/17/2023  10:00 AM

## 2023-03-17 NOTE — TELEPHONE ENCOUNTER
LM for pt to return call. Pt is needing a NP appt.   Diagnosis   I20.8 (ICD-10-CM) - Atypical angina (HCC)   R00.2 (ICD-10-CM) - Palpitations

## 2023-03-30 ENCOUNTER — OFFICE VISIT (OUTPATIENT)
Dept: CARDIOLOGY CLINIC | Age: 33
End: 2023-03-30
Payer: COMMERCIAL

## 2023-03-30 VITALS
WEIGHT: 126.8 LBS | BODY MASS INDEX: 21.13 KG/M2 | SYSTOLIC BLOOD PRESSURE: 130 MMHG | HEART RATE: 99 BPM | HEIGHT: 65 IN | DIASTOLIC BLOOD PRESSURE: 70 MMHG

## 2023-03-30 DIAGNOSIS — R07.2 PRECORDIAL PAIN: ICD-10-CM

## 2023-03-30 DIAGNOSIS — R00.2 PALPITATION: Primary | ICD-10-CM

## 2023-03-30 PROCEDURE — 1036F TOBACCO NON-USER: CPT | Performed by: NUCLEAR MEDICINE

## 2023-03-30 PROCEDURE — 93000 ELECTROCARDIOGRAM COMPLETE: CPT | Performed by: NUCLEAR MEDICINE

## 2023-03-30 PROCEDURE — 99204 OFFICE O/P NEW MOD 45 MIN: CPT | Performed by: NUCLEAR MEDICINE

## 2023-03-30 PROCEDURE — G8484 FLU IMMUNIZE NO ADMIN: HCPCS | Performed by: NUCLEAR MEDICINE

## 2023-03-30 PROCEDURE — G8427 DOCREV CUR MEDS BY ELIG CLIN: HCPCS | Performed by: NUCLEAR MEDICINE

## 2023-03-30 PROCEDURE — G8420 CALC BMI NORM PARAMETERS: HCPCS | Performed by: NUCLEAR MEDICINE

## 2023-03-30 ASSESSMENT — ENCOUNTER SYMPTOMS
RECTAL PAIN: 0
ABDOMINAL PAIN: 0
NAUSEA: 0
CHEST TIGHTNESS: 1
ABDOMINAL DISTENTION: 0
ANAL BLEEDING: 0
DIARRHEA: 0
PHOTOPHOBIA: 0
VOMITING: 0
BLOOD IN STOOL: 0
COLOR CHANGE: 0
CONSTIPATION: 0
FACIAL SWELLING: 0
SHORTNESS OF BREATH: 1
BACK PAIN: 0

## 2023-03-30 NOTE — PROGRESS NOTES
[address] [facility] [address]  [facility] [address]  Dept: [phone]  Dept Fax: [phone]  Loc: [phone]    Visit Date: 3/30/2023    Luzmaria Conway is a 33 y.o. female who presents todayfor:  Chief Complaint   Patient presents with    New Patient    Palpitations   Here for the first time  Does have different allergies to food items  On allergy shots  Lately chest pain   More like pressure  Intermittent   Both resting and exertional   Some neck pain   Some back pain   Palpitation as well   Intermittent   No triggers  Does have asthma   No known DM , HTN   No smoking   Family history of CAD    HPI:  Palpitations   Associated symptoms include shortness of breath. Pertinent negatives include no chest pain, diaphoresis, dizziness, nausea or vomiting.    Past Medical History:   Diagnosis Date    ADHD     Anemia     on iron supplements    Asthma     Albuterol and Advair    Bladder prolapse, female, acquired     Constipation     Headache(784.0)     Interstitial cystitis     Mental disorder     bipolar    Sickle cell anemia ([facility] [address]     Has trait      Past Surgical History:   Procedure Laterality Date    ANKLE SURGERY      bilateral ankle revision    COLONOSCOPY  2013    CYSTOSCOPY  9/28/15    WITH HYDRODISTENTION    DILATION AND CURETTAGE OF UTERUS      for Missed AB    FOOT SURGERY Bilateral 06/22/2016    FOOT SURGERY Left 09/27/2017    osteotomy , gastrocnemius resectopm    FOOT SURGERY Right 03/28/2018    LAPAROSCOPY  7/1/13    OTHER SURGICAL HISTORY Left 02/14/2018    Removal of Screw from Calcaneus Left heel     [place] GASTROCNEMIUS RECESSION Left 9/27/2017    MEDIAL CALCANEALSLIDE WITH SCREW FIXATION LEFT FOOT, COTTON OSTEOTOMY LEFT FOOT, GASTROC LENGTHENING LEFT LEG performed by Jeanine Whitehead DPM at [address] Right 3/28/2018    MEDIAL CALCANEAL DISPLACEMENT OSTEOTOMY RIGHT FOOT,

## 2023-04-04 ENCOUNTER — TELEPHONE (OUTPATIENT)
Dept: ALLERGY | Age: 33
End: 2023-04-04

## 2023-04-04 NOTE — TELEPHONE ENCOUNTER
Completed PA renewal for xolair on covermymeds. Pt has different insurance and will inform provider to send RX to [facility] in [facility].

## 2023-04-04 NOTE — TELEPHONE ENCOUNTER
Received a call from [facility] from [address] [place] [address], clinical pharmacist reviewing PA request for xolair. She is stating she needs documentation of clinical improvement of asthma symptoms for pt because 3/17/23 chart notes do that reflect that she states. Informed Kira that pt has been on this medication since January of 2021 and pt was receiving medication previously from [facility] due to pt's insurance at the time but now has commercial insurance. Informed Kira I will have provider do this request on 4/5/23 when she is back in the office. Kira informed me to fax this updated notes to [phone].

## 2023-04-05 NOTE — TELEPHONE ENCOUNTER
Provider instructed me to call pt to check to see if she has been getting xolair and if she wants to continue because [facility] pharmacy is stating but has not got a shipment since July. Attempted to call patient. Got voicemail, left message to call the office.

## 2023-04-24 ENCOUNTER — TELEPHONE (OUTPATIENT)
Dept: CARDIOLOGY CLINIC | Age: 33
End: 2023-04-24

## 2023-04-24 NOTE — TELEPHONE ENCOUNTER
No-show letter mailed to patient for echo, treadmill stress and 24 hr holter placement on 4/19/23. Orders will need reprinted from encounter date of 3/30/23.

## 2023-05-17 ENCOUNTER — TELEPHONE (OUTPATIENT)
Dept: ALLERGY | Age: 33
End: 2023-05-17

## 2023-05-17 NOTE — TELEPHONE ENCOUNTER
Received a fax from [place] [address] stating no PA on file for pt's xolair. [facility] [address] and spoke to Samy and informed pt cannot fill medication with them anymore due to pt now having commercial insurance. Pt's [facility] health plan is secondary. Informed Samy that PA was denial for [employer] which is primary. Informed pt also never followed up with office about this information. Samy states she is noting this in pt's file and will close out account.

## 2023-09-15 ENCOUNTER — TELEPHONE (OUTPATIENT)
Dept: ALLERGY | Age: 33
End: 2023-09-15

## 2023-11-03 ENCOUNTER — TELEPHONE (OUTPATIENT)
Dept: ALLERGY | Age: 33
End: 2023-11-03

## 2023-12-13 ENCOUNTER — APPOINTMENT (OUTPATIENT)
Dept: CT IMAGING | Age: 33
End: 2023-12-13
Payer: COMMERCIAL

## 2023-12-13 ENCOUNTER — HOSPITAL ENCOUNTER (EMERGENCY)
Age: 33
Discharge: HOME | End: 2023-12-14
Payer: COMMERCIAL

## 2023-12-13 ENCOUNTER — APPOINTMENT (OUTPATIENT)
Dept: ULTRASOUND IMAGING | Age: 33
End: 2023-12-13
Payer: COMMERCIAL

## 2023-12-13 VITALS
OXYGEN SATURATION: 98 % | HEART RATE: 76 BPM | TEMPERATURE: 98.4 F | DIASTOLIC BLOOD PRESSURE: 71 MMHG | SYSTOLIC BLOOD PRESSURE: 121 MMHG | RESPIRATION RATE: 15 BRPM

## 2023-12-13 DIAGNOSIS — R59.0 INGUINAL LYMPHADENOPATHY: Primary | ICD-10-CM

## 2023-12-13 DIAGNOSIS — K59.00 CONSTIPATION, UNSPECIFIED CONSTIPATION TYPE: ICD-10-CM

## 2023-12-13 PROCEDURE — 84703 CHORIONIC GONADOTROPIN ASSAY: CPT

## 2023-12-13 PROCEDURE — 80053 COMPREHEN METABOLIC PANEL: CPT

## 2023-12-13 PROCEDURE — 76882 US LMTD JT/FCL EVL NVASC XTR: CPT

## 2023-12-13 PROCEDURE — 85025 COMPLETE CBC W/AUTO DIFF WBC: CPT

## 2023-12-13 PROCEDURE — 99285 EMERGENCY DEPT VISIT HI MDM: CPT

## 2023-12-13 PROCEDURE — 36415 COLL VENOUS BLD VENIPUNCTURE: CPT

## 2023-12-14 ENCOUNTER — APPOINTMENT (OUTPATIENT)
Dept: CT IMAGING | Age: 33
End: 2023-12-14
Payer: COMMERCIAL

## 2023-12-14 LAB
ALBUMIN SERPL BCG-MCNC: 3.9 G/DL (ref 3.5–5.1)
ALP SERPL-CCNC: 67 U/L (ref 38–126)
ALT SERPL W/O P-5'-P-CCNC: 9 U/L (ref 11–66)
ANION GAP SERPL CALC-SCNC: 9 MEQ/L (ref 8–16)
AST SERPL-CCNC: 14 U/L (ref 5–40)
B-HCG SERPL QL: NEGATIVE
BASOPHILS ABSOLUTE: 0 THOU/MM3 (ref 0–0.1)
BASOPHILS NFR BLD AUTO: 0.9 %
BILIRUB SERPL-MCNC: 0.2 MG/DL (ref 0.3–1.2)
BUN SERPL-MCNC: 18 MG/DL (ref 7–22)
CALCIUM SERPL-MCNC: 8.9 MG/DL (ref 8.5–10.5)
CHLORIDE SERPL-SCNC: 108 MEQ/L (ref 98–111)
CO2 SERPL-SCNC: 23 MEQ/L (ref 23–33)
CREAT SERPL-MCNC: 0.7 MG/DL (ref 0.4–1.2)
DEPRECATED RDW RBC AUTO: 41.7 FL (ref 35–45)
EOSINOPHIL NFR BLD AUTO: 4.4 %
EOSINOPHILS ABSOLUTE: 0.2 THOU/MM3 (ref 0–0.4)
ERYTHROCYTE [DISTWIDTH] IN BLOOD BY AUTOMATED COUNT: 13.4 % (ref 11.5–14.5)
GFR SERPL CREATININE-BSD FRML MDRD: > 60 ML/MIN/1.73M2
GLUCOSE SERPL-MCNC: 110 MG/DL (ref 70–108)
HCT VFR BLD AUTO: 39.4 % (ref 37–47)
HGB BLD-MCNC: 12.3 GM/DL (ref 12–16)
IMM GRANULOCYTES # BLD AUTO: 0.01 THOU/MM3 (ref 0–0.07)
IMM GRANULOCYTES NFR BLD AUTO: 0.2 %
LYMPHOCYTES ABSOLUTE: 2.4 THOU/MM3 (ref 1–4.8)
LYMPHOCYTES NFR BLD AUTO: 43.2 %
MCH RBC QN AUTO: 26.7 PG (ref 26–33)
MCHC RBC AUTO-ENTMCNC: 31.2 GM/DL (ref 32.2–35.5)
MCV RBC AUTO: 85.7 FL (ref 81–99)
MONOCYTES ABSOLUTE: 0.4 THOU/MM3 (ref 0.4–1.3)
MONOCYTES NFR BLD AUTO: 7.3 %
NEUTROPHILS NFR BLD AUTO: 44 %
NRBC BLD AUTO-RTO: 0 /100 WBC
OSMOLALITY SERPL CALC.SUM OF ELEC: 281.9 MOSMOL/KG (ref 275–300)
PLATELET # BLD AUTO: 274 THOU/MM3 (ref 130–400)
PMV BLD AUTO: 11 FL (ref 9.4–12.4)
POTASSIUM SERPL-SCNC: 4.1 MEQ/L (ref 3.5–5.2)
PROT SERPL-MCNC: 6.7 G/DL (ref 6.1–8)
RBC # BLD AUTO: 4.6 MILL/MM3 (ref 4.2–5.4)
SEGMENTED NEUTROPHILS ABSOLUTE COUNT: 2.4 THOU/MM3 (ref 1.8–7.7)
SODIUM SERPL-SCNC: 140 MEQ/L (ref 135–145)
WBC # BLD AUTO: 5.5 THOU/MM3 (ref 4.8–10.8)

## 2023-12-14 PROCEDURE — 6360000004 HC RX CONTRAST MEDICATION: Performed by: NURSE PRACTITIONER

## 2023-12-14 PROCEDURE — 74177 CT ABD & PELVIS W/CONTRAST: CPT

## 2023-12-14 RX ADMIN — IOPAMIDOL 80 ML: 755 INJECTION, SOLUTION INTRAVENOUS at 00:34

## 2023-12-14 NOTE — ED NOTES
Patient informed that we are just waiting on CT results a this time. Patient resting in bed. Respirations easy and unlabored. No distress noted. Call light within reach.        Melyssa Steel RN  12/14/23 [phone]

## 2023-12-14 NOTE — ED NOTES
IV placed and blood work drawn at this time. Patient resting in bed. Respirations easy and unlabored. No distress noted. Call light within reach.        Bridger Fitzpatrick RN  12/13/23 4202

## 2023-12-14 NOTE — ED TRIAGE NOTES
Pt in through ED lobby. She states she has had an abscess in her groin area for the past year. She states she has had it assessed by her PCP who told her it was normal. She states it has never bother her but over the past 2 weeks she has had increased pain to the area. She denies any drainage from the site.

## 2023-12-14 NOTE — DISCHARGE INSTRUCTIONS
Drink plenty of water. Use the mag citrate chews for constipation. Follow up with your pcp. Take your medication as indicated and prescribed. Take either milk of magnesium or 1/2 bottle of magnesium citrate if no bowel movement. Eat foods high in fiber, or start using fiber supplements. PLEASE RETURN TO THE EMERGENCY DEPARTMENT IMMEDIATELY for worsening symptoms, inability to have a bowel movement or if you develop any concerning symptoms such as: high fever not relieved by acetaminophen (Tylenol) and/or ibuprofen (Motrin / Advil), chills, shortness of breath, chest pain, feeling of your heart fluttering or racing, persistent nausea and/or vomiting, vomiting up blood, blood in your stool, numbness, loss of consciousness, weakness or tingling in the arms or legs or change in color of the extremities, changes in mental status, persistent headache, blurry vision, loss of bladder / bowel control, unable to follow up with your physician, or other any other care or concern.

## 2023-12-15 NOTE — ED PROVIDER NOTES
[facility] EMERGENCY DEPT      EMERGENCY MEDICINE     Pt Name: Kurt Clay  MRN: [id number]  [facility] [facility] 1990  Date of evaluation: 2023  Provider: ANDERSON Michele CNP    CHIEF COMPLAINT       Chief Complaint   Patient presents with    Abscess     HISTORY OF PRESENT ILLNESS   Kurt Clay is a pleasant 29 y.o. female who presents to the emergency department from home with c/o concern for an abscess on her right groin. This has been present x 1 year. She saw her pcp who told her it was normal.  It used ot not be painful but over the last 2 weeks it has gotten progressively more painful and swollen. Denies fevers. Does report weeks to months of systemic symptoms like fatigue and myalgias. She has reported this to her PCP and has had labs but no source has ever been found.         History is obtained from:  patient  PASTMEDICAL HISTORY     Past Medical History:   Diagnosis Date    ADHD     Anemia     on iron supplements    Asthma     Albuterol and Advair    Bladder prolapse, female, acquired     Constipation     Headache(784.0)     Interstitial cystitis     Mental disorder     bipolar    Sickle cell anemia ([address])     Has trait       Patient Active Problem List   Diagnosis Code    Constipation K59.00    Numbness R20.0    Blurred vision, bilateral H53.8    Normal delivery O80    Chronic interstitial cystitis N30.10    Normal delivery O80     labor in third trimester with  delivery, fetus 1 O58.17X3    Twin pregnancy, dichorionic/diamniotic, third trimester O30.043    Dysfunction of both eustachian tubes H69.93    Seasonal allergic rhinitis J30.2    Reactive airway disease J45.909    Gastroesophageal reflux disease with esophagitis without hemorrhage K21.00    Hiatal hernia K44.9    Esophageal spasm K22.4    Migraine-cluster headache syndrome G44.009    Reflux pharyngitis J02.9    Chronic idiopathic urticaria L50.1    Elevated IgE level R76.8    Moderate persistent asthma J45.40

## 2024-06-25 ENCOUNTER — APPOINTMENT (OUTPATIENT)
Dept: GENERAL RADIOLOGY | Age: 34
End: 2024-06-25
Payer: COMMERCIAL

## 2024-06-25 ENCOUNTER — HOSPITAL ENCOUNTER (EMERGENCY)
Age: 34
Discharge: HOME | End: 2024-06-25
Payer: COMMERCIAL

## 2024-06-25 VITALS
SYSTOLIC BLOOD PRESSURE: 121 MMHG | BODY MASS INDEX: 23.16 KG/M2 | HEIGHT: 65 IN | WEIGHT: 139 LBS | TEMPERATURE: 98 F | HEART RATE: 93 BPM | DIASTOLIC BLOOD PRESSURE: 68 MMHG | RESPIRATION RATE: 18 BRPM | OXYGEN SATURATION: 99 %

## 2024-06-25 DIAGNOSIS — S90.32XA CONTUSION OF LEFT FOOT, INITIAL ENCOUNTER: Primary | ICD-10-CM

## 2024-06-25 PROCEDURE — 99283 EMERGENCY DEPT VISIT LOW MDM: CPT

## 2024-06-25 PROCEDURE — 73630 X-RAY EXAM OF FOOT: CPT

## 2024-06-25 ASSESSMENT — PAIN - FUNCTIONAL ASSESSMENT: PAIN_FUNCTIONAL_ASSESSMENT: 0-10

## 2024-06-25 ASSESSMENT — PAIN SCALES - GENERAL: PAINLEVEL_OUTOF10: 8

## 2024-06-25 ASSESSMENT — PAIN DESCRIPTION - ORIENTATION: ORIENTATION: LEFT

## 2024-06-25 ASSESSMENT — PAIN DESCRIPTION - LOCATION: LOCATION: FOOT

## 2024-06-26 NOTE — DISCHARGE INSTRUCTIONS
Ice and elevate the extremity.  Wear wrap to help with swelling.  If symptoms are not improving in 2-3 days, you should see your pcp or Ortho for a repeat xray.  Tylenol and ibuprofen for pain.     irregular
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
## 2024-07-17 ENCOUNTER — HOSPITAL ENCOUNTER (EMERGENCY)
Age: 34
Discharge: HOME | End: 2024-07-18
Payer: OTHER MISCELLANEOUS

## 2024-07-17 ENCOUNTER — APPOINTMENT (OUTPATIENT)
Dept: GENERAL RADIOLOGY | Age: 34
End: 2024-07-17
Payer: OTHER MISCELLANEOUS

## 2024-07-17 VITALS
DIASTOLIC BLOOD PRESSURE: 83 MMHG | BODY MASS INDEX: 23.3 KG/M2 | HEART RATE: 70 BPM | SYSTOLIC BLOOD PRESSURE: 120 MMHG | RESPIRATION RATE: 18 BRPM | TEMPERATURE: 98.3 F | WEIGHT: 140 LBS | OXYGEN SATURATION: 100 %

## 2024-07-17 DIAGNOSIS — S83.412A SPRAIN OF MEDIAL COLLATERAL LIGAMENT OF LEFT KNEE, INITIAL ENCOUNTER: Primary | ICD-10-CM

## 2024-07-17 PROCEDURE — 99283 EMERGENCY DEPT VISIT LOW MDM: CPT

## 2024-07-17 PROCEDURE — 73564 X-RAY EXAM KNEE 4 OR MORE: CPT

## 2024-07-17 ASSESSMENT — PAIN DESCRIPTION - PAIN TYPE: TYPE: ACUTE PAIN

## 2024-07-17 ASSESSMENT — PAIN DESCRIPTION - LOCATION: LOCATION: KNEE

## 2024-07-17 ASSESSMENT — PAIN SCALES - GENERAL: PAINLEVEL_OUTOF10: 9

## 2024-07-17 ASSESSMENT — PAIN DESCRIPTION - ORIENTATION: ORIENTATION: LEFT

## 2024-07-17 ASSESSMENT — PAIN - FUNCTIONAL ASSESSMENT: PAIN_FUNCTIONAL_ASSESSMENT: 0-10

## 2024-07-18 ENCOUNTER — LAB (OUTPATIENT)
Dept: LAB | Age: 34
End: 2024-07-18
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
## 2024-07-18 NOTE — DISCHARGE INSTRUCTIONS
Call today or tomorrow to follow up with Moriah Mcmanus MD  in 3 days.  Follow up with OIO    Use an ice pack or bag filled with ice and apply to the injured area 3 - 4 times a day for 15 - 20 minutes each time.    Use ibuprofen or Tylenol (unless prescribed medications that have Tylenol in it) for pain.  You can take over the counter Ibuprofen (advil) tablets (4 every 8 hours or 3 every 6 hours or 2 every 4 hours)    Use your crutches for the next several days until you are able to take 10 steps without pain.    Return to the Emergency Department for worsening of pain, swelling to the knee, inability to move your knee, unable to walk, any other care or concern.

## 2024-07-19 LAB
SOURCE: NORMAL
SOURCE: NORMAL

## 2024-07-22 LAB
C. TRACHOMATIS DNA,THIN PREP: NEGATIVE
N. GONORRHOEAE DNA, THIN PREP: NEGATIVE
SOURCE: NORMAL
SOURCE: NORMAL
TRICHOMONAS VAGINALI, MOLECULAR: NEGATIVE

## 2024-07-26 LAB — CYTOLOGY THIN PREP PAP: NORMAL

## 2024-09-20 ENCOUNTER — HOSPITAL ENCOUNTER (EMERGENCY)
Age: 34
Discharge: HOME | End: 2024-09-20
Payer: COMMERCIAL

## 2024-09-20 VITALS
SYSTOLIC BLOOD PRESSURE: 121 MMHG | OXYGEN SATURATION: 100 % | RESPIRATION RATE: 16 BRPM | DIASTOLIC BLOOD PRESSURE: 88 MMHG | TEMPERATURE: 98.8 F | HEART RATE: 91 BPM

## 2024-09-20 DIAGNOSIS — L23.4 ALLERGIC CONTACT DERMATITIS DUE TO DYES: Primary | ICD-10-CM

## 2024-09-20 PROCEDURE — 96372 THER/PROPH/DIAG INJ SC/IM: CPT

## 2024-09-20 PROCEDURE — 99213 OFFICE O/P EST LOW 20 MIN: CPT

## 2024-09-20 PROCEDURE — 6360000002 HC RX W HCPCS

## 2024-09-20 RX ORDER — METHYLPREDNISOLONE ACETATE 80 MG/ML
80 INJECTION, SUSPENSION INTRA-ARTICULAR; INTRALESIONAL; INTRAMUSCULAR; SOFT TISSUE ONCE
Status: COMPLETED | OUTPATIENT
Start: 2024-09-20 | End: 2024-09-20

## 2024-09-20 RX ADMIN — METHYLPREDNISOLONE ACETATE 80 MG: 80 INJECTION, SUSPENSION INTRA-ARTICULAR; INTRALESIONAL; INTRAMUSCULAR; SOFT TISSUE at 16:01

## 2024-09-20 ASSESSMENT — ENCOUNTER SYMPTOMS
SHORTNESS OF BREATH: 0
COUGH: 0
EYES NEGATIVE: 1
GASTROINTESTINAL NEGATIVE: 1
RESPIRATORY NEGATIVE: 1
ALLERGIC/IMMUNOLOGIC NEGATIVE: 1

## 2024-09-20 ASSESSMENT — PAIN - FUNCTIONAL ASSESSMENT: PAIN_FUNCTIONAL_ASSESSMENT: NONE - DENIES PAIN

## 2024-12-06 ENCOUNTER — APPOINTMENT (OUTPATIENT)
Dept: CT IMAGING | Age: 34
End: 2024-12-06
Payer: COMMERCIAL

## 2024-12-06 ENCOUNTER — APPOINTMENT (OUTPATIENT)
Dept: INTERVENTIONAL RADIOLOGY/VASCULAR | Age: 34
End: 2024-12-06
Payer: COMMERCIAL

## 2024-12-06 ENCOUNTER — HOSPITAL ENCOUNTER (EMERGENCY)
Age: 34
Discharge: HOME | End: 2024-12-06
Attending: EMERGENCY MEDICINE
Payer: COMMERCIAL

## 2024-12-06 VITALS
OXYGEN SATURATION: 100 % | TEMPERATURE: 97.9 F | DIASTOLIC BLOOD PRESSURE: 73 MMHG | SYSTOLIC BLOOD PRESSURE: 108 MMHG | HEART RATE: 63 BPM | RESPIRATION RATE: 17 BRPM

## 2024-12-06 DIAGNOSIS — J06.9 ACUTE UPPER RESPIRATORY INFECTION: ICD-10-CM

## 2024-12-06 DIAGNOSIS — R07.89 OTHER CHEST PAIN: Primary | ICD-10-CM

## 2024-12-06 LAB
ALBUMIN SERPL BCG-MCNC: 3.5 G/DL (ref 3.5–5.1)
ALP SERPL-CCNC: 71 U/L (ref 38–126)
ALT SERPL W/O P-5'-P-CCNC: 15 U/L (ref 11–66)
ANION GAP SERPL CALC-SCNC: 13 MEQ/L (ref 8–16)
AST SERPL-CCNC: 20 U/L (ref 5–40)
B-HCG SERPL QL: NEGATIVE
BASOPHILS ABSOLUTE: 0 THOU/MM3 (ref 0–0.1)
BASOPHILS NFR BLD AUTO: 0.7 %
BILIRUB SERPL-MCNC: 0.2 MG/DL (ref 0.3–1.2)
BUN SERPL-MCNC: 13 MG/DL (ref 7–22)
CALCIUM SERPL-MCNC: 9 MG/DL (ref 8.5–10.5)
CHLORIDE SERPL-SCNC: 109 MEQ/L (ref 98–111)
CO2 SERPL-SCNC: 20 MEQ/L (ref 23–33)
CREAT SERPL-MCNC: 0.7 MG/DL (ref 0.4–1.2)
DEPRECATED RDW RBC AUTO: 45.8 FL (ref 35–45)
EKG ATRIAL RATE: 61 BPM
EKG P AXIS: 79 DEGREES
EKG P-R INTERVAL: 152 MS
EKG Q-T INTERVAL: 388 MS
EKG QRS DURATION: 86 MS
EKG QTC CALCULATION (BAZETT): 390 MS
EKG R AXIS: 68 DEGREES
EKG T AXIS: 72 DEGREES
EKG VENTRICULAR RATE: 61 BPM
EOSINOPHIL NFR BLD AUTO: 4.2 %
EOSINOPHILS ABSOLUTE: 0.2 THOU/MM3 (ref 0–0.4)
ERYTHROCYTE [DISTWIDTH] IN BLOOD BY AUTOMATED COUNT: 16.2 % (ref 11.5–14.5)
GFR SERPL CREATININE-BSD FRML MDRD: > 90 ML/MIN/1.73M2
GLUCOSE SERPL-MCNC: 80 MG/DL (ref 70–108)
HCT VFR BLD AUTO: 33.2 % (ref 37–47)
HGB BLD-MCNC: 9.8 GM/DL (ref 12–16)
IMM GRANULOCYTES # BLD AUTO: 0.01 THOU/MM3 (ref 0–0.07)
IMM GRANULOCYTES NFR BLD AUTO: 0.2 %
LYMPHOCYTES ABSOLUTE: 2.8 THOU/MM3 (ref 1–4.8)
LYMPHOCYTES NFR BLD AUTO: 49.5 %
MCH RBC QN AUTO: 23.2 PG (ref 26–33)
MCHC RBC AUTO-ENTMCNC: 29.5 GM/DL (ref 32.2–35.5)
MCV RBC AUTO: 78.7 FL (ref 81–99)
MONOCYTES ABSOLUTE: 0.3 THOU/MM3 (ref 0.4–1.3)
MONOCYTES NFR BLD AUTO: 6.1 %
NEUTROPHILS ABSOLUTE: 2.2 THOU/MM3 (ref 1.8–7.7)
NEUTROPHILS NFR BLD AUTO: 39.3 %
NRBC BLD AUTO-RTO: 0 /100 WBC
OSMOLALITY SERPL CALC.SUM OF ELEC: 282.2 MOSMOL/KG (ref 275–300)
PLATELET # BLD AUTO: 286 THOU/MM3 (ref 130–400)
PMV BLD AUTO: 10.7 FL (ref 9.4–12.4)
POTASSIUM SERPL-SCNC: 3.6 MEQ/L (ref 3.5–5.2)
PROT SERPL-MCNC: 6.7 G/DL (ref 6.1–8)
RBC # BLD AUTO: 4.22 MILL/MM3 (ref 4.2–5.4)
SODIUM SERPL-SCNC: 142 MEQ/L (ref 135–145)
TROPONIN, HIGH SENSITIVITY: < 6 NG/L (ref 0–12)
WBC # BLD AUTO: 5.7 THOU/MM3 (ref 4.8–10.8)

## 2024-12-06 PROCEDURE — 93971 EXTREMITY STUDY: CPT

## 2024-12-06 PROCEDURE — 6360000004 HC RX CONTRAST MEDICATION: Performed by: EMERGENCY MEDICINE

## 2024-12-06 PROCEDURE — 93010 ELECTROCARDIOGRAM REPORT: CPT | Performed by: INTERNAL MEDICINE

## 2024-12-06 PROCEDURE — 93005 ELECTROCARDIOGRAM TRACING: CPT | Performed by: EMERGENCY MEDICINE

## 2024-12-06 PROCEDURE — 36415 COLL VENOUS BLD VENIPUNCTURE: CPT

## 2024-12-06 PROCEDURE — 85025 COMPLETE CBC W/AUTO DIFF WBC: CPT

## 2024-12-06 PROCEDURE — 71275 CT ANGIOGRAPHY CHEST: CPT

## 2024-12-06 PROCEDURE — 99285 EMERGENCY DEPT VISIT HI MDM: CPT

## 2024-12-06 PROCEDURE — 80053 COMPREHEN METABOLIC PANEL: CPT

## 2024-12-06 PROCEDURE — 84484 ASSAY OF TROPONIN QUANT: CPT

## 2024-12-06 PROCEDURE — 84703 CHORIONIC GONADOTROPIN ASSAY: CPT

## 2024-12-06 RX ORDER — IBUPROFEN 400 MG/1
400 TABLET, FILM COATED ORAL EVERY 6 HOURS PRN
Qty: 20 TABLET | Refills: 0 | Status: SHIPPED | OUTPATIENT
Start: 2024-12-06

## 2024-12-06 RX ORDER — IOPAMIDOL 755 MG/ML
80 INJECTION, SOLUTION INTRAVASCULAR
Status: COMPLETED | OUTPATIENT
Start: 2024-12-06 | End: 2024-12-06

## 2024-12-06 RX ADMIN — IOPAMIDOL 80 ML: 755 INJECTION, SOLUTION INTRAVENOUS at 10:35

## 2024-12-06 ASSESSMENT — PAIN - FUNCTIONAL ASSESSMENT: PAIN_FUNCTIONAL_ASSESSMENT: 0-10

## 2024-12-06 ASSESSMENT — PAIN DESCRIPTION - LOCATION: LOCATION: CHEST

## 2024-12-06 ASSESSMENT — PAIN SCALES - GENERAL: PAINLEVEL_OUTOF10: 10

## 2024-12-06 ASSESSMENT — PAIN DESCRIPTION - PAIN TYPE: TYPE: ACUTE PAIN

## 2024-12-06 NOTE — DISCHARGE INSTR - COC
Continuity of Care Form    Patient Name: Linda DENNIS Benedict   :  1990  MRN:  [id number]    Admit date:  2024  Discharge date:  ***    Code Status Order: Prior   Advance Directives:   Advance Care Flowsheet Documentation             Admitting Physician:  No admitting provider for patient encounter.  PCP: Moriah Mcmanus MD    Discharging Nurse: ***  Discharging Hospital Unit/Room#: 23/023A  Discharging Unit Phone Number: ***    Emergency Contact:   No emergency contact information on file.    Past Surgical History:  Past Surgical History:   Procedure Laterality Date    ANKLE SURGERY      bilateral ankle revision    COLONOSCOPY  2013    CYSTOSCOPY  9/28/15    WITH HYDRODISTENTION    DILATION AND CURETTAGE OF UTERUS      for Missed AB    FOOT SURGERY Bilateral 2016    FOOT SURGERY Left 2017    osteotomy , gastrocnemius resectopm    FOOT SURGERY Right 2018    LAPAROSCOPY  13    OTHER SURGICAL HISTORY Left 2018    Removal of Screw from Calcaneus Left heel     [place] GASTROCNEMIUS RECESSION Left 2017    MEDIAL CALCANEALSLIDE WITH SCREW FIXATION LEFT FOOT, COTTON OSTEOTOMY LEFT FOOT, GASTROC LENGTHENING LEFT LEG performed by Arnol Leo DPM at [facility] OR    GA OSTECTOMY COMPLETE 5TH METATARSAL HEAD Right 3/28/2018    MEDIAL CALCANEAL DISPLACEMENT OSTEOTOMY RIGHT FOOT, GASTROCNEMIUS LENGTHENING RIGHT LEG, COTTON OSTEOTOMY FIRST CUNEIFORM RIGHT FOOT WITH FORWEB RIGHT FOOT performed by Arnol Leo DPM at [facility] OR    GA REMOVAL IMPLANT DEEP Left 2018    REMOVAL OF SCREW DROM CALCANEUS LEFT HEEL performed by Arnol Leo DPM at [facility] OR    WISDOM TOOTH EXTRACTION         Immunization History:   Immunization History   Administered Date(s) Administered    TDaP, ADACEL (age 10y-64y), BOOSTRIX (age 10y+), IM, 0.5mL 05/15/2014, 2017       Active Problems:  Patient Active Problem List   Diagnosis Code    Constipation K59.00  N Benedict  is necessary for the continuing treatment of the diagnosis listed and that she requires {Admit to Appropriate Level of Care:03392} for {GREATER/LESS:386251298} 30 days.     Update Admission H&P: {CHP DME Changes in HandP:643227821}    PHYSICIAN SIGNATURE:  {Esignature:545501053}

## 2024-12-06 NOTE — ED PROVIDER NOTES
[facility] EMERGENCY DEPT  EMERGENCY DEPARTMENT ENCOUNTER          Pt Name: Linda Benedict  MRN: [id number]  Birthdate 1990  Date of evaluation: 12/6/2024  Physician: Partha Kim MD, FACEP, FAAEM, FAWM      CHIEF COMPLAINT       Chief Complaint   Patient presents with    Chest Pain         HISTORY OF PRESENT ILLNESS    HPI  Linda Benedict is a 34 y.o. female who presents to the emergency department from home, as a walk in to the ED lobby for evaluation of chest pain.  Patient states that since Thanksgiving, about a week ago has been having mid chest pain associated with shortness of breath when walking around, cough without expectoration but patient states that she feels taste of blood on the back of the throat after coughing and left-sided leg pain and swelling.  Denies taking birth control or any exogenous hormones, recent travel, recent injuries, recent surgeries.  Patient states that she has also had an unintentional significant weight gain over the last week.  The patient has no other acute complaints at this time.      PAST MEDICAL AND SURGICAL HISTORY     Past Medical History:   Diagnosis Date    ADHD     Anemia     on iron supplements    Asthma     Albuterol and Advair    Bladder prolapse, female, acquired     Constipation     Headache(784.0)     Interstitial cystitis     Mental disorder     bipolar    Sickle cell anemia (HCC)     Has trait     Past Surgical History:   Procedure Laterality Date    ANKLE SURGERY      bilateral ankle revision    COLONOSCOPY  2013    CYSTOSCOPY  9/28/15    WITH HYDRODISTENTION    DILATION AND CURETTAGE OF UTERUS      for Missed AB    FOOT SURGERY Bilateral 06/22/2016    FOOT SURGERY Left 09/27/2017    osteotomy , gastrocnemius resectopm    FOOT SURGERY Right 03/28/2018    LAPAROSCOPY  7/1/13    OTHER SURGICAL HISTORY Left 02/14/2018    Removal of Screw from Calcaneus Left heel     [place] GASTROCNEMIUS RECESSION Left 9/27/2017    MEDIAL CALCANEALSLIDE WITH  (ISOVUE-370) 76 % injection 80 mL (80 mLs IntraVENous Given 12/6/24 1035)         ED Course as of 12/06/24 1138   Fri Dec 06, 2024   0856 Levon' Criteria for DVT     RESULT SUMMARY:  3 points  High risk group for DVT. \"Likely\" according to [place] DVT studies.      INPUTS:  Active cancer -> 0 = No  Bedridden recently >3 days or major surgery within 12 weeks -> 0 = No  Calf swelling >3 cm compared to the other leg -> 1 = Yes  Collateral (nonvaricose) superficial veins present -> 0 = No  Entire leg swollen -> 0 = No  Localized tenderness along the deep venous system -> 1 = Yes  Pitting edema, confined to symptomatic leg -> 1 = Yes  Paralysis, paresis, or recent plaster immobilization of the lower extremity -> 0 = No  Previously documented DVT -> 0 = No  Alternative diagnosis to DVT as likely or more likely -> 0 = No [DT]   0856 PERC Rule for Pulmonary Embolism     RESULT SUMMARY:  2 criteria  If any criteria are positive, the PERC rule cannot be used to rule out PE in this patient.      INPUTS:  Age >=50 -> 0 = No  HR >=100 -> 0 = No  O2 sat on room air <95% -> 0 = No  Unilateral leg swelling -> 1 = Yes  Hemoptysis -> 1 = Yes  Recent surgery or trauma -> 0 = No  Prior PE or DVT -> 0 = No  Hormone use -> 0 = No [DT]   0857 HEART Score for Major Cardiac Events     RESULT SUMMARY:  0 points  Low Score (0-3 points)    Risk of MACE of 0.9-1.7%.      INPUTS:  History -> 0 = Slightly suspicious  EKG -> 0 = Normal  Age -> 0 = <45  Risk factors -> 0 = No known risk factors  Initial troponin -> 0 = <=normal limit [DT]   1023 Updated patient on current results and plan of pulmonary CT angiogram. [DT]   1136 Patient feeling better.  I have informed her of her lab and radiology results.  She is comfortable going home and following up with PCP as well as lab and radiology results.  She is comfortable going home and following up with PCP as well as her requested rheumatology referral.    Will give symptomatic treatment [DT]      ED

## 2024-12-10 ENCOUNTER — TELEPHONE (OUTPATIENT)
Dept: CARDIOLOGY CLINIC | Age: 34
End: 2024-12-10

## 2024-12-10 NOTE — TELEPHONE ENCOUNTER
Received a referral from Nataly Monahan at [facility] for intermittent chest pain and SOB.   Called patient, no answer. [facility].    Patient seen Dr. Ortega on 3-30-23, was going to ask if she would like to stick with Dr. Ortega since she seen him last year or if she would like to see Dr. Gao.

## 2024-12-13 ENCOUNTER — HOSPITAL ENCOUNTER (OUTPATIENT)
Age: 34
Discharge: HOME | End: 2024-12-13
Payer: COMMERCIAL

## 2024-12-13 LAB
ALBUMIN SERPL BCG-MCNC: 4 G/DL (ref 3.5–5.1)
ALP SERPL-CCNC: 81 U/L (ref 38–126)
ALT SERPL W/O P-5'-P-CCNC: 17 U/L (ref 11–66)
ANION GAP SERPL CALC-SCNC: 11 MEQ/L (ref 8–16)
AST SERPL-CCNC: 27 U/L (ref 5–40)
BASOPHILS ABSOLUTE: 0 THOU/MM3 (ref 0–0.1)
BASOPHILS NFR BLD AUTO: 0.7 %
BILIRUB CONJ SERPL-MCNC: < 0.1 MG/DL (ref 0.1–13.8)
BILIRUB SERPL-MCNC: 0.2 MG/DL (ref 0.3–1.2)
BUN SERPL-MCNC: 16 MG/DL (ref 7–22)
CALCIUM SERPL-MCNC: 9.5 MG/DL (ref 8.5–10.5)
CHLORIDE SERPL-SCNC: 105 MEQ/L (ref 98–111)
CO2 SERPL-SCNC: 21 MEQ/L (ref 23–33)
CREAT SERPL-MCNC: 0.7 MG/DL (ref 0.4–1.2)
CRP SERPL-MCNC: < 0.3 MG/DL (ref 0–1)
DEPRECATED RDW RBC AUTO: 45.1 FL (ref 35–45)
EOSINOPHIL NFR BLD AUTO: 2.8 %
EOSINOPHILS ABSOLUTE: 0.2 THOU/MM3 (ref 0–0.4)
ERYTHROCYTE [DISTWIDTH] IN BLOOD BY AUTOMATED COUNT: 15.9 % (ref 11.5–14.5)
ERYTHROCYTE [SEDIMENTATION RATE] IN BLOOD BY WESTERGREN METHOD: 12 MM/HR (ref 0–20)
GFR SERPL CREATININE-BSD FRML MDRD: > 90 ML/MIN/1.73M2
GLUCOSE SERPL-MCNC: 97 MG/DL (ref 70–108)
HCT VFR BLD AUTO: 35.8 % (ref 37–47)
HGB BLD-MCNC: 10.5 GM/DL (ref 12–16)
IMM GRANULOCYTES # BLD AUTO: 0.01 THOU/MM3 (ref 0–0.07)
IMM GRANULOCYTES NFR BLD AUTO: 0.2 %
LYMPHOCYTES ABSOLUTE: 3 THOU/MM3 (ref 1–4.8)
LYMPHOCYTES NFR BLD AUTO: 55.2 %
MCH RBC QN AUTO: 22.9 PG (ref 26–33)
MCHC RBC AUTO-ENTMCNC: 29.3 GM/DL (ref 32.2–35.5)
MCV RBC AUTO: 78.2 FL (ref 81–99)
MONOCYTES ABSOLUTE: 0.3 THOU/MM3 (ref 0.4–1.3)
MONOCYTES NFR BLD AUTO: 5 %
NEUTROPHILS ABSOLUTE: 1.9 THOU/MM3 (ref 1.8–7.7)
NEUTROPHILS NFR BLD AUTO: 36.1 %
NRBC BLD AUTO-RTO: 0 /100 WBC
PLATELET # BLD AUTO: 382 THOU/MM3 (ref 130–400)
PMV BLD AUTO: 10.7 FL (ref 9.4–12.4)
POTASSIUM SERPL-SCNC: 4.3 MEQ/L (ref 3.5–5.2)
PROT SERPL-MCNC: 7.7 G/DL (ref 6.1–8)
RBC # BLD AUTO: 4.58 MILL/MM3 (ref 4.2–5.4)
RHEUMATOID FACT SERPL-ACNC: 10 IU/ML (ref 0–13)
SODIUM SERPL-SCNC: 137 MEQ/L (ref 135–145)
T4 FREE SERPL-MCNC: 1.14 NG/DL (ref 0.93–1.68)
TSH SERPL DL<=0.005 MIU/L-ACNC: 1.4 UIU/ML (ref 0.4–4.2)
URATE SERPL-MCNC: 2.7 MG/DL (ref 2.4–5.7)
WBC # BLD AUTO: 5.4 THOU/MM3 (ref 4.8–10.8)

## 2024-12-13 PROCEDURE — 86200 CCP ANTIBODY: CPT

## 2024-12-13 PROCEDURE — 84550 ASSAY OF BLOOD/URIC ACID: CPT

## 2024-12-13 PROCEDURE — 85025 COMPLETE CBC W/AUTO DIFF WBC: CPT

## 2024-12-13 PROCEDURE — 85651 RBC SED RATE NONAUTOMATED: CPT

## 2024-12-13 PROCEDURE — 82248 BILIRUBIN DIRECT: CPT

## 2024-12-13 PROCEDURE — 36415 COLL VENOUS BLD VENIPUNCTURE: CPT

## 2024-12-13 PROCEDURE — 84443 ASSAY THYROID STIM HORMONE: CPT

## 2024-12-13 PROCEDURE — 84439 ASSAY OF FREE THYROXINE: CPT

## 2024-12-13 PROCEDURE — 80053 COMPREHEN METABOLIC PANEL: CPT

## 2024-12-13 PROCEDURE — 86038 ANTINUCLEAR ANTIBODIES: CPT

## 2024-12-13 PROCEDURE — 86140 C-REACTIVE PROTEIN: CPT

## 2024-12-13 PROCEDURE — 86430 RHEUMATOID FACTOR TEST QUAL: CPT

## 2024-12-15 LAB — NUCLEAR IGG SER QL IA: NORMAL

## 2024-12-16 LAB — CYCLIC CITRULLINATED PEPTIDE ANTIBODY IGG: 1.4 U/ML (ref 0–7)

## 2024-12-17 ENCOUNTER — TELEPHONE (OUTPATIENT)
Dept: CARDIOLOGY CLINIC | Age: 34
End: 2024-12-17

## 2024-12-17 NOTE — TELEPHONE ENCOUNTER
Pt no showed appointment on 12/17/24. Left voicemail for pt to call our office back if they would like to reschedule.

## 2024-12-26 ENCOUNTER — HOSPITAL ENCOUNTER (OUTPATIENT)
Age: 34
Discharge: HOME | End: 2024-12-28
Payer: COMMERCIAL

## 2024-12-26 DIAGNOSIS — R06.02 SHORTNESS OF BREATH: ICD-10-CM

## 2024-12-26 DIAGNOSIS — R07.9 INTERMITTENT CHEST PAIN: ICD-10-CM

## 2024-12-26 LAB
ECHO AO ASC DIAM: 2.5 CM
ECHO AV CUSP MM: 1.7 CM
ECHO AV MEAN GRADIENT: 4 MMHG
ECHO AV MEAN VELOCITY: 1 M/S
ECHO AV PEAK GRADIENT: 8 MMHG
ECHO AV PEAK VELOCITY: 1.4 M/S
ECHO AV VELOCITY RATIO: 0.64
ECHO AV VTI: 26.9 CM
ECHO EST RA PRESSURE: 3 MMHG
ECHO LA AREA 2C: 16.9 CM2
ECHO LA AREA 4C: 14.2 CM2
ECHO LA DIAMETER: 3 CM
ECHO LA MAJOR AXIS: 5.2 CM
ECHO LA MINOR AXIS: 5 CM
ECHO LA VOL BP: 39 ML (ref 22–52)
ECHO LA VOL MOD A2C: 47 ML (ref 22–52)
ECHO LA VOL MOD A4C: 31 ML (ref 22–52)
ECHO LV E' LATERAL VELOCITY: 17.1 CM/S
ECHO LV E' SEPTAL VELOCITY: 11.3 CM/S
ECHO LV EDV A2C: 127 ML
ECHO LV EDV A4C: 79 ML
ECHO LV EJECTION FRACTION A2C: 54 %
ECHO LV EJECTION FRACTION A4C: 50 %
ECHO LV EJECTION FRACTION BIPLANE: 52 % (ref 55–100)
ECHO LV ESV A2C: 58 ML
ECHO LV ESV A4C: 39 ML
ECHO LV FRACTIONAL SHORTENING: 29 % (ref 28–44)
ECHO LV INTERNAL DIMENSION DIASTOLIC: 4.5 CM (ref 3.9–5.3)
ECHO LV INTERNAL DIMENSION SYSTOLIC: 3.2 CM
ECHO LV ISOVOLUMETRIC RELAXATION TIME (IVRT): 70 MS
ECHO LV IVSD: 0.8 CM (ref 0.6–0.9)
ECHO LV MASS 2D: 113.6 G (ref 67–162)
ECHO LV POSTERIOR WALL DIASTOLIC: 0.8 CM (ref 0.6–0.9)
ECHO LV RELATIVE WALL THICKNESS RATIO: 0.36
ECHO LVOT AV VTI INDEX: 0.7
ECHO LVOT MEAN GRADIENT: 2 MMHG
ECHO LVOT PEAK GRADIENT: 3 MMHG
ECHO LVOT PEAK VELOCITY: 0.9 M/S
ECHO LVOT VTI: 18.9 CM
ECHO MV A VELOCITY: 0.56 M/S
ECHO MV E DECELERATION TIME (DT): 182 MS
ECHO MV E VELOCITY: 0.79 M/S
ECHO MV E/A RATIO: 1.41
ECHO MV E/E' LATERAL: 4.62
ECHO MV E/E' RATIO (AVERAGED): 5.81
ECHO MV E/E' SEPTAL: 6.99
ECHO MV REGURGITANT PEAK GRADIENT: 67 MMHG
ECHO MV REGURGITANT PEAK VELOCITY: 4.1 M/S
ECHO PULMONARY ARTERY END DIASTOLIC PRESSURE: 3 MMHG
ECHO PV MAX VELOCITY: 0.7 M/S
ECHO PV PEAK GRADIENT: 2 MMHG
ECHO PV REGURGITANT MAX VELOCITY: 0.8 M/S
ECHO RIGHT VENTRICULAR SYSTOLIC PRESSURE (RVSP): 20 MMHG
ECHO RV INTERNAL DIMENSION: 2.4 CM
ECHO RV TAPSE: 1.4 CM (ref 1.7–?)
ECHO TV E WAVE: 0.9 M/S
ECHO TV REGURGITANT MAX VELOCITY: 2.08 M/S
ECHO TV REGURGITANT PEAK GRADIENT: 17 MMHG

## 2024-12-26 PROCEDURE — 93306 TTE W/DOPPLER COMPLETE: CPT

## 2025-01-02 NOTE — PATIENT INSTRUCTIONS
Your Provider for Today: Dr. Renteria  Your nurses for today: Jewel    You may receive a survey regarding the care you received during your visit.  Your input is valuable to us.  We encourage you to complete and return your survey.  We hope you will choose us in the future for your healthcare needs.

## 2025-01-03 ENCOUNTER — OFFICE VISIT (OUTPATIENT)
Dept: CARDIOLOGY CLINIC | Age: 35
End: 2025-01-03

## 2025-01-03 VITALS
OXYGEN SATURATION: 99 % | HEART RATE: 108 BPM | HEIGHT: 65 IN | SYSTOLIC BLOOD PRESSURE: 134 MMHG | WEIGHT: 150.8 LBS | DIASTOLIC BLOOD PRESSURE: 70 MMHG | BODY MASS INDEX: 25.12 KG/M2

## 2025-01-03 DIAGNOSIS — R07.89 ATYPICAL CHEST PAIN: Primary | ICD-10-CM

## 2025-01-03 NOTE — PROGRESS NOTES
Pt here for chest pain     C/O left sided chest pain mild for a couple days ache-then she feels like she is having a heart attack.

## 2025-01-03 NOTE — PROGRESS NOTES
[facility] [place] [facility]  [facility] CARDIOLOGY  [address]  [address]  [place] [place] [address]  Dept: [phone]  Dept Fax: [phone]  Loc: [phone]    Visit Date: 1/3/2025  Ms. Benedict is a 34 y.o. female  who presented for:   Chief Complaint   Patient presents with    Follow-up        HPI:   35 yo F c hx of ADHD, Asthma, Sickle cell trait, is referred for chest pain and shortness of breath.  As per patient shortness of breath is ongoing for several months. Chest pain is intermittent.        Current Outpatient Medications:     ibuprofen (IBU) 400 MG tablet, Take 1 tablet by mouth every 6 hours as needed for Pain, Disp: 20 tablet, Rfl: 0    Magnesium Citrate 83 MG CHEW, Take 1-2 each by mouth nightly as needed (constipation), Disp: 30 tablet, Rfl: 0    levocetirizine (XYZAL ALLERGY 24HR) 5 MG tablet, Take 1 tablet by mouth nightly, Disp: 30 tablet, Rfl: 11    albuterol sulfate HFA (PROVENTIL HFA) 108 (90 Base) MCG/ACT inhaler, Inhale 2 puffs into the lungs every 6 hours as needed for Wheezing, Disp: 1 each, Rfl: 2    albuterol (PROVENTIL) (2.5 MG/3ML) 0.083% nebulizer solution, Take 3 mLs by nebulization every 6 hours as needed for Wheezing, Disp: 120 each, Rfl: 3    Multiple Vitamins-Minerals (THERAPEUTIC MULTIVITAMIN-MINERALS) tablet, Take 1 tablet by mouth daily, Disp: , Rfl:     EPINEPHrine (EPIPEN 2-NATHAN) 0.3 MG/0.3ML SOAJ injection, Inject 0.3 mLs into the muscle once for 1 dose Use as directed for allergic reaction, Disp: 0.3 mL, Rfl: 0    acetaminophen (TYLENOL) 325 MG tablet, Take 500 mg by mouth every 6 hours as needed for Pain , Disp: , Rfl:     Past Medical History   has a past medical history of ADHD, Anemia, Asthma, Bladder prolapse, female, acquired, Constipation, Headache(784.0), Interstitial cystitis, Mental disorder, and Sickle cell anemia (HCC).    Social History  Linda  reports that she has never smoked. She has never used smokeless tobacco. She reports that

## 2025-02-02 ENCOUNTER — APPOINTMENT (OUTPATIENT)
Dept: GENERAL RADIOLOGY | Age: 35
End: 2025-02-02
Payer: COMMERCIAL

## 2025-02-02 ENCOUNTER — HOSPITAL ENCOUNTER (EMERGENCY)
Age: 35
Discharge: HOME | End: 2025-02-02
Attending: EMERGENCY MEDICINE
Payer: COMMERCIAL

## 2025-02-02 VITALS
BODY MASS INDEX: 24.99 KG/M2 | TEMPERATURE: 98.2 F | WEIGHT: 150 LBS | HEART RATE: 115 BPM | DIASTOLIC BLOOD PRESSURE: 81 MMHG | RESPIRATION RATE: 22 BRPM | SYSTOLIC BLOOD PRESSURE: 99 MMHG | OXYGEN SATURATION: 98 % | HEIGHT: 65 IN

## 2025-02-02 DIAGNOSIS — J10.1 INFLUENZA A: Primary | ICD-10-CM

## 2025-02-02 LAB
ALBUMIN SERPL BCG-MCNC: 3.5 G/DL (ref 3.5–5.1)
ALP SERPL-CCNC: 51 U/L (ref 38–126)
ALT SERPL W/O P-5'-P-CCNC: 10 U/L (ref 11–66)
ANION GAP SERPL CALC-SCNC: 13 MEQ/L (ref 8–16)
AST SERPL-CCNC: 23 U/L (ref 5–40)
B-HCG SERPL QL: NEGATIVE
BASOPHILS ABSOLUTE: 0 THOU/MM3 (ref 0–0.1)
BASOPHILS NFR BLD AUTO: 0.3 %
BILIRUB SERPL-MCNC: < 0.2 MG/DL (ref 0.3–1.2)
BUN SERPL-MCNC: 9 MG/DL (ref 7–22)
CALCIUM SERPL-MCNC: 8.6 MG/DL (ref 8.5–10.5)
CHLORIDE SERPL-SCNC: 104 MEQ/L (ref 98–111)
CO2 SERPL-SCNC: 18 MEQ/L (ref 23–33)
CREAT SERPL-MCNC: 0.7 MG/DL (ref 0.4–1.2)
DEPRECATED RDW RBC AUTO: 49.3 FL (ref 35–45)
EKG ATRIAL RATE: 110 BPM
EKG P AXIS: 84 DEGREES
EKG P-R INTERVAL: 148 MS
EKG Q-T INTERVAL: 350 MS
EKG QRS DURATION: 80 MS
EKG QTC CALCULATION (BAZETT): 473 MS
EKG R AXIS: 74 DEGREES
EKG T AXIS: 103 DEGREES
EKG VENTRICULAR RATE: 110 BPM
EOSINOPHIL NFR BLD AUTO: 0 %
EOSINOPHILS ABSOLUTE: 0 THOU/MM3 (ref 0–0.4)
ERYTHROCYTE [DISTWIDTH] IN BLOOD BY AUTOMATED COUNT: 17.3 % (ref 11.5–14.5)
FLUAV RNA RESP QL NAA+PROBE: DETECTED
FLUBV RNA RESP QL NAA+PROBE: NOT DETECTED
GFR SERPL CREATININE-BSD FRML MDRD: > 90 ML/MIN/1.73M2
GLUCOSE SERPL-MCNC: 80 MG/DL (ref 70–108)
HCT VFR BLD AUTO: 33.5 % (ref 37–47)
HGB BLD-MCNC: 9.7 GM/DL (ref 12–16)
IMM GRANULOCYTES # BLD AUTO: 0.01 THOU/MM3 (ref 0–0.07)
IMM GRANULOCYTES NFR BLD AUTO: 0.3 %
LYMPHOCYTES ABSOLUTE: 0.8 THOU/MM3 (ref 1–4.8)
LYMPHOCYTES NFR BLD AUTO: 21.4 %
MAGNESIUM SERPL-MCNC: 1.9 MG/DL (ref 1.6–2.4)
MCH RBC QN AUTO: 22.8 PG (ref 26–33)
MCHC RBC AUTO-ENTMCNC: 29 GM/DL (ref 32.2–35.5)
MCV RBC AUTO: 78.6 FL (ref 81–99)
MONOCYTES ABSOLUTE: 0.4 THOU/MM3 (ref 0.4–1.3)
MONOCYTES NFR BLD AUTO: 10.6 %
NEUTROPHILS ABSOLUTE: 2.6 THOU/MM3 (ref 1.8–7.7)
NEUTROPHILS NFR BLD AUTO: 67.4 %
NRBC BLD AUTO-RTO: 0 /100 WBC
OSMOLALITY SERPL CALC.SUM OF ELEC: 267.8 MOSMOL/KG (ref 275–300)
PLATELET # BLD AUTO: 271 THOU/MM3 (ref 130–400)
PMV BLD AUTO: 10.9 FL (ref 9.4–12.4)
POTASSIUM SERPL-SCNC: 3.4 MEQ/L (ref 3.5–5.2)
PROT SERPL-MCNC: 6.6 G/DL (ref 6.1–8)
RBC # BLD AUTO: 4.26 MILL/MM3 (ref 4.2–5.4)
SARS-COV-2 RNA RESP QL NAA+PROBE: NOT DETECTED
SODIUM SERPL-SCNC: 135 MEQ/L (ref 135–145)
WBC # BLD AUTO: 3.8 THOU/MM3 (ref 4.8–10.8)

## 2025-02-02 PROCEDURE — 84703 CHORIONIC GONADOTROPIN ASSAY: CPT

## 2025-02-02 PROCEDURE — 2580000003 HC RX 258: Performed by: EMERGENCY MEDICINE

## 2025-02-02 PROCEDURE — 85025 COMPLETE CBC W/AUTO DIFF WBC: CPT

## 2025-02-02 PROCEDURE — 96360 HYDRATION IV INFUSION INIT: CPT

## 2025-02-02 PROCEDURE — 71046 X-RAY EXAM CHEST 2 VIEWS: CPT

## 2025-02-02 PROCEDURE — 99285 EMERGENCY DEPT VISIT HI MDM: CPT

## 2025-02-02 PROCEDURE — 6370000000 HC RX 637 (ALT 250 FOR IP): Performed by: EMERGENCY MEDICINE

## 2025-02-02 PROCEDURE — 83735 ASSAY OF MAGNESIUM: CPT

## 2025-02-02 PROCEDURE — 36415 COLL VENOUS BLD VENIPUNCTURE: CPT

## 2025-02-02 PROCEDURE — 93005 ELECTROCARDIOGRAM TRACING: CPT | Performed by: EMERGENCY MEDICINE

## 2025-02-02 PROCEDURE — 80053 COMPREHEN METABOLIC PANEL: CPT

## 2025-02-02 PROCEDURE — 87636 SARSCOV2 & INF A&B AMP PRB: CPT

## 2025-02-02 PROCEDURE — 96361 HYDRATE IV INFUSION ADD-ON: CPT

## 2025-02-02 RX ORDER — ONDANSETRON 4 MG/1
4 TABLET, ORALLY DISINTEGRATING ORAL 3 TIMES DAILY PRN
Qty: 21 TABLET | Refills: 0 | Status: SHIPPED | OUTPATIENT
Start: 2025-02-02

## 2025-02-02 RX ORDER — 0.9 % SODIUM CHLORIDE 0.9 %
1000 INTRAVENOUS SOLUTION INTRAVENOUS ONCE
Status: COMPLETED | OUTPATIENT
Start: 2025-02-02 | End: 2025-02-02

## 2025-02-02 RX ORDER — GUAIFENESIN/DEXTROMETHORPHAN 100-10MG/5
5 SYRUP ORAL 3 TIMES DAILY PRN
Qty: 120 ML | Refills: 0 | Status: SHIPPED | OUTPATIENT
Start: 2025-02-02 | End: 2025-02-12

## 2025-02-02 RX ORDER — ACETAMINOPHEN 500 MG
1000 TABLET ORAL
Status: COMPLETED | OUTPATIENT
Start: 2025-02-02 | End: 2025-02-02

## 2025-02-02 RX ADMIN — SODIUM CHLORIDE 1000 ML: 9 INJECTION, SOLUTION INTRAVENOUS at 13:22

## 2025-02-02 RX ADMIN — ACETAMINOPHEN 1000 MG: 500 TABLET ORAL at 13:13

## 2025-02-02 ASSESSMENT — PAIN - FUNCTIONAL ASSESSMENT: PAIN_FUNCTIONAL_ASSESSMENT: NONE - DENIES PAIN

## 2025-02-02 NOTE — ED PROVIDER NOTES
normal. No congestion.      Mouth/Throat:      Mouth: Mucous membranes are moist.      Pharynx: Oropharynx is clear.   Eyes:      Conjunctiva/sclera: Conjunctivae normal.   Cardiovascular:      Rate and Rhythm: Regular rhythm. Tachycardia present.      Pulses: Normal pulses.      Heart sounds: Normal heart sounds.   Pulmonary:      Effort: Pulmonary effort is normal. No respiratory distress.      Breath sounds: Normal breath sounds. No wheezing.   Abdominal:      General: There is no distension.      Palpations: Abdomen is soft.      Tenderness: There is no abdominal tenderness. There is no guarding.   Musculoskeletal:         General: Normal range of motion.      Cervical back: Normal range of motion and neck supple. No tenderness.      Comments: Tenderness to the costochondral junction b/l   Lymphadenopathy:      Cervical: No cervical adenopathy.   Skin:     General: Skin is warm and dry.      Capillary Refill: Capillary refill takes 2 to 3 seconds.   Neurological:      General: No focal deficit present.      Mental Status: She is alert and oriented to person, place, and time.   Psychiatric:         Mood and Affect: Mood normal.         FORMAL DIAGNOSTIC RESULTS     RADIOLOGY: Interpretation per the Radiologist below, if available at the time of this note (none if blank):    XR CHEST (2 VW)   Final Result   No interval change since previous study dated 2/1/2019, no acute   cardiopulmonary disease..               **This report has been created using voice recognition software. It may contain   minor errors which are inherent in voice recognition technology.**      Electronically signed by Dr. Wallace Maldonado          LABS: (none if blank)  Labs Reviewed   COVID-19 & INFLUENZA COMBO - Abnormal; Notable for the following components:       Result Value    Influenza A DETECTED (*)     All other components within normal limits   COMPREHENSIVE METABOLIC PANEL W/ REFLEX TO MG FOR LOW K - Abnormal; Notable for the following

## 2025-02-02 NOTE — DISCHARGE INSTR - COC
Elimination:  Continence:   Bowel: {YES / NO:}  Bladder: {YES / NO:}  Urinary Catheter: {Urinary Catheter:209192084}   Colostomy/Ileostomy/Ileal Conduit: {YES / NO:}       Date of Last BM: ***  No intake or output data in the 24 hours ending 25 1539  No intake/output data recorded.    Safety Concerns:     { ELISA Safety Concerns:978110615}    Impairments/Disabilities:      { ELISA Impairments/Disabilities:898314091}    Nutrition Therapy:  Current Nutrition Therapy:   { ELISA Diet List:033486350}    Routes of Feeding: {[facility] DME Other Feedings:412930397}  Liquids: {Slp liquid thickness:00530}  Daily Fluid Restriction: {[facility] DME Yes amt example:355695075}  Last Modified Barium Swallow with Video (Video Swallowing Test): {Done Not Done Date:381227022}    Treatments at the Time of Hospital Discharge:   Respiratory Treatments: ***  Oxygen Therapy:  {Therapy; copd oxygen:49938}  Ventilator:    { CC Vent List:704577747}    Rehab Therapies: {THERAPEUTIC INTERVENTION:9989023957}  Weight Bearing Status/Restrictions: {[facility] Weight Bearin}  Other Medical Equipment (for information only, NOT a DME order):  {EQUIPMENT:187494142}  Other Treatments: ***    Patient's personal belongings (please select all that are sent with patient):  {[facility] DME Belongings:599748438}    RN SIGNATURE:  {Esignature:410364587}    CASE MANAGEMENT/SOCIAL WORK SECTION    Inpatient Status Date: ***    Readmission Risk Assessment Score:  [facility] RISK OF UNPLANNED READMISSION 2.0             0 Total Score        Discharging to Facility/ Agency   Name:   Address:  Phone:  Fax:    Dialysis Facility (if applicable)   Name:  Address:  Dialysis Schedule:  Phone:  Fax:    / signature: {Esignature:945980199}    PHYSICIAN SECTION    Prognosis: {Prognosis:9031855389}    Condition at Discharge: { Patient Condition:374783526}    Rehab Potential (if transferring to Rehab): {Prognosis:6209464544}    Recommended Labs

## 2025-02-18 NOTE — DISCHARGE INSTRUCTIONS
Situation:  Outreach for routine follow up.    Key Assessments:  Pt was told that her out of pocket cost for Trulicity would be $800.00. Pt is not taking it as of now. Pt is having flare up of lower back pain over the weekend. Pain was 5/10.    Actions Taken: CM provided H[employer] MOBILITY and  contact information for savings. CM offered to contact PCP for alternative medication. Pt declined as she will contact herself. CM offered to message PCP for pain specialist or order for MRI. Pt declined at this time.     Program Plan:   F/u with cost savings for trulicity.    See hyperlinks within encounter for full documentation     You were seen here today for body aches, fever, cough.  Take Tylenol and alternate with Motrin as needed for fever.  Make sure drink plenty of fluids including water and something with electrolytes such as Gatorade.  Make sure not to skip any meals.  Follow-up with your primary care doctor within next 2 days.  Return here if you develop worsening symptoms or inability to keep any food or drink down.

## 2025-03-03 ENCOUNTER — HOSPITAL ENCOUNTER (EMERGENCY)
Age: 35
Discharge: HOME | End: 2025-03-03
Payer: COMMERCIAL

## 2025-03-03 VITALS
HEART RATE: 97 BPM | DIASTOLIC BLOOD PRESSURE: 75 MMHG | SYSTOLIC BLOOD PRESSURE: 126 MMHG | RESPIRATION RATE: 18 BRPM | OXYGEN SATURATION: 99 % | TEMPERATURE: 97.9 F

## 2025-03-03 DIAGNOSIS — J20.9 ACUTE BRONCHITIS, UNSPECIFIED ORGANISM: Primary | ICD-10-CM

## 2025-03-03 PROCEDURE — 99213 OFFICE O/P EST LOW 20 MIN: CPT

## 2025-03-03 RX ORDER — PREDNISONE 20 MG/1
20 TABLET ORAL 2 TIMES DAILY
Qty: 10 TABLET | Refills: 0 | Status: SHIPPED | OUTPATIENT
Start: 2025-03-03 | End: 2025-03-08

## 2025-03-03 ASSESSMENT — ENCOUNTER SYMPTOMS
GASTROINTESTINAL NEGATIVE: 1
EYES NEGATIVE: 1
ALLERGIC/IMMUNOLOGIC NEGATIVE: 1
SHORTNESS OF BREATH: 1
COUGH: 1

## 2025-03-03 ASSESSMENT — PAIN SCALES - GENERAL: PAINLEVEL_OUTOF10: 7

## 2025-03-03 ASSESSMENT — PAIN DESCRIPTION - LOCATION: LOCATION: CHEST

## 2025-03-03 ASSESSMENT — PAIN - FUNCTIONAL ASSESSMENT: PAIN_FUNCTIONAL_ASSESSMENT: 0-10

## 2025-03-03 NOTE — ED PROVIDER NOTES
[facility] URGENT CARE  Urgent Care Encounter       CHIEF COMPLAINT       Chief Complaint   Patient presents with    Cough       Nurses Notes reviewed and I agree except as noted in the HPI.  HISTORY OF PRESENT ILLNESS   Linda Benedict is a 34 y.o. female who presents to urgent care for complaints of cough and congestion.  Symptoms started 4 days ago.  States she has been coughing a lot at night and feels like she may have aspirated her just.  States her mucus is green in color and thick.  States she just got over influenza a few weeks ago and is using her albuterol inhaler more.  States does have history of asthma.  Denies fever, nausea, vomiting and diarrhea.    The history is provided by the patient. No  was used.       REVIEW OF SYSTEMS     Review of Systems   Constitutional: Negative.    HENT:  Positive for congestion.    Eyes: Negative.    Respiratory:  Positive for cough and shortness of breath.    Cardiovascular: Negative.    Gastrointestinal: Negative.    Endocrine: Negative.    Genitourinary: Negative.    Musculoskeletal: Negative.    Skin: Negative.    Allergic/Immunologic: Negative.    Neurological: Negative.    Hematological: Negative.    Psychiatric/Behavioral: Negative.         PAST MEDICAL HISTORY         Diagnosis Date    ADHD     Anemia     on iron supplements    Asthma     Albuterol and Advair    Bladder prolapse, female, acquired     Constipation     Headache(784.0)     Interstitial cystitis     Mental disorder     bipolar    Sickle cell anemia (HCC)     Has trait       SURGICALHISTORY     Patient  has a past surgical history that includes Ankle surgery; Colonoscopy (2013); laparoscopy (7/1/13); [place] tooth extraction (2014); Cystocopy (9/28/15); Foot surgery (Bilateral, 06/22/2016); Dilation and curettage of uterus; Foot surgery (Left, 09/27/2017); pr gastrocnemius recession (Left, 9/27/2017); other surgical history (Left, 02/14/2018); pr removal implant deep (Left,

## 2025-03-03 NOTE — ED TRIAGE NOTES
Started this last Thursday, Has been having a productive cough(green),chest hurts(worse with coughing) head congestion, right ear hurts,

## 2025-03-03 NOTE — DISCHARGE INSTRUCTIONS
Medications as prescribed.  Increase fluid intake.  Can take over-the-counter Zyrtec, Flonase and Mucinex D for congestion.  Follow-up with family doctor in 3 to 5 days.  Go to the emergency room for any difficulty breathing new or worsening symptoms.

## 2025-04-30 ENCOUNTER — HOSPITAL ENCOUNTER (OUTPATIENT)
Age: 35
Discharge: HOME | End: 2025-04-30
Payer: COMMERCIAL

## 2025-04-30 LAB
BASOPHILS ABSOLUTE: 0.1 THOU/MM3 (ref 0–0.1)
BASOPHILS NFR BLD AUTO: 0.9 %
DEPRECATED RDW RBC AUTO: 47.2 FL (ref 35–45)
EOSINOPHIL NFR BLD AUTO: 4.9 %
EOSINOPHILS ABSOLUTE: 0.3 THOU/MM3 (ref 0–0.4)
ERYTHROCYTE [DISTWIDTH] IN BLOOD BY AUTOMATED COUNT: 17.4 % (ref 11.5–14.5)
FERRITIN SERPL IA-MCNC: 7 NG/ML (ref 13–150)
HCT VFR BLD AUTO: 32.2 % (ref 37–47)
HGB BLD-MCNC: 9.4 GM/DL (ref 12–16)
IMM GRANULOCYTES # BLD AUTO: 0.01 THOU/MM3 (ref 0–0.07)
IMM GRANULOCYTES NFR BLD AUTO: 0.2 %
IRON SATN MFR SERPL: 6 % (ref 20–50)
IRON SERPL-MCNC: 24 UG/DL (ref 37–145)
LYMPHOCYTES ABSOLUTE: 3.1 THOU/MM3 (ref 1–4.8)
LYMPHOCYTES NFR BLD AUTO: 55.2 %
MCH RBC QN AUTO: 22.1 PG (ref 26–33)
MCHC RBC AUTO-ENTMCNC: 29.2 GM/DL (ref 32.2–35.5)
MCV RBC AUTO: 75.6 FL (ref 81–99)
MONOCYTES ABSOLUTE: 0.4 THOU/MM3 (ref 0.4–1.3)
MONOCYTES NFR BLD AUTO: 7.5 %
NEUTROPHILS ABSOLUTE: 1.8 THOU/MM3 (ref 1.8–7.7)
NEUTROPHILS NFR BLD AUTO: 31.3 %
NRBC BLD AUTO-RTO: 0 /100 WBC
PLATELET # BLD AUTO: 332 THOU/MM3 (ref 130–400)
PMV BLD AUTO: 10.8 FL (ref 9.4–12.4)
RBC # BLD AUTO: 4.26 MILL/MM3 (ref 4.2–5.4)
TIBC SERPL-MCNC: 370 UG/DL (ref 171–450)
WBC # BLD AUTO: 5.7 THOU/MM3 (ref 4.8–10.8)

## 2025-04-30 PROCEDURE — 85025 COMPLETE CBC W/AUTO DIFF WBC: CPT

## 2025-04-30 PROCEDURE — 83550 IRON BINDING TEST: CPT

## 2025-04-30 PROCEDURE — 82728 ASSAY OF FERRITIN: CPT

## 2025-04-30 PROCEDURE — 82103 ALPHA-1-ANTITRYPSIN TOTAL: CPT

## 2025-04-30 PROCEDURE — 82104 ALPHA-1-ANTITRYPSIN PHENO: CPT

## 2025-04-30 PROCEDURE — 36415 COLL VENOUS BLD VENIPUNCTURE: CPT

## 2025-04-30 PROCEDURE — 82785 ASSAY OF IGE: CPT

## 2025-04-30 PROCEDURE — 83540 ASSAY OF IRON: CPT

## 2025-05-01 LAB — IGE SERPL-ACNC: 840 IU/ML (ref 0–100)

## 2025-05-05 LAB
A1AT PHENOTYP SERPL-IMP: NORMAL
A1AT SERPL-MCNC: 135 MG/DL (ref 90–200)

## 2025-06-26 ENCOUNTER — HOSPITAL ENCOUNTER (OUTPATIENT)
Age: 35
Discharge: HOME | End: 2025-06-26
Payer: COMMERCIAL

## 2025-06-26 LAB
25(OH)D3 SERPL-MCNC: 8 NG/ML (ref 30–100)
FOLATE SERPL-MCNC: 6.8 NG/ML (ref 4.6–34.8)
HIV 1+2 AB+HIV1 P24 AG SERPL QL IA: NORMAL
RPR SER QL: NONREACTIVE
VIT B12 SERPL-MCNC: 601 PG/ML (ref 232–1245)

## 2025-06-26 PROCEDURE — 86696 HERPES SIMPLEX TYPE 2 TEST: CPT

## 2025-06-26 PROCEDURE — 82746 ASSAY OF FOLIC ACID SERUM: CPT

## 2025-06-26 PROCEDURE — 36415 COLL VENOUS BLD VENIPUNCTURE: CPT

## 2025-06-26 PROCEDURE — 86592 SYPHILIS TEST NON-TREP QUAL: CPT

## 2025-06-26 PROCEDURE — 87389 HIV-1 AG W/HIV-1&-2 AB AG IA: CPT

## 2025-06-26 PROCEDURE — 84207 ASSAY OF VITAMIN B-6: CPT

## 2025-06-26 PROCEDURE — 82607 VITAMIN B-12: CPT

## 2025-06-26 PROCEDURE — 82306 VITAMIN D 25 HYDROXY: CPT

## 2025-06-26 PROCEDURE — 86695 HERPES SIMPLEX TYPE 1 TEST: CPT

## 2025-06-28 LAB
HSV1 GG IGG SER-ACNC: < 0.01 IV
HSV2 GG IGG SER-ACNC: 6.3 IV

## 2025-06-30 LAB — PYRIDOXAL PHOS SERPL-SCNC: 37.4 NMOL/L (ref 20–125)

## (undated) DEVICE — Device

## (undated) DEVICE — PACK PROCEDURE SURG POD SC SRHP LF

## (undated) DEVICE — SOLUTION IV 1000ML 0.9% SOD CHL PH 5 INJ USP VIAFLX PLAS

## (undated) DEVICE — IMPREGNATED GAUZE DRESSING: Brand: CUTICERIN 7.5X7.5CM CTN 50

## (undated) DEVICE — GLOVE ORANGE PI 7 1/2   MSG9075

## (undated) DEVICE — PRECISION (9.0 X 0.51 X 31.0MM)

## (undated) DEVICE — PADDING CAST W4XL144IN WHT COT SYN RL NONADHESIVE SOF-ROL

## (undated) DEVICE — TRAY PREP DRY W/ PREM GLV 2 APPL 6 SPNG 2 UNDPD 1 OVERWRAP

## (undated) DEVICE — GLOVE ORANGE PI 8   MSG9080

## (undated) DEVICE — THREADED GUIDE WIRE

## (undated) DEVICE — GLOVE SURG SZ 8 L11.77IN FNGR THK9.8MIL STRW LTX POLYMER

## (undated) DEVICE — THIN OFFSET (13.3 X 0.38 X 42.0MM)

## (undated) DEVICE — TAPE CAST W4INXL4YD WHT FBRGLS POLYUR RESIN LO TACK RIG

## (undated) DEVICE — SET ADMIN PRIMING 7ML L30IN 7.35LB 20 GTT 2ND RLER CLMP

## (undated) DEVICE — GLOVE ORANGE PI 7   MSG9070

## (undated) DEVICE — INTENDED FOR TISSUE SEPARATION, AND OTHER PROCEDURES THAT REQUIRE A SHARP SURGICAL BLADE TO PUNCTURE OR CUT.: Brand: BARD-PARKER ® CARBON RIB-BACK BLADES

## (undated) DEVICE — SPONGE GZ W4XL4IN COT 12 PLY TYP VII WVN C FLD DSGN

## (undated) DEVICE — TAPE CAST W3INXL4YD WHT FBRGLS POLYUR RESIN LO TACK RIG

## (undated) DEVICE — THIN OFFSET (9.0 X 0.38 X 25.0MM)